# Patient Record
Sex: FEMALE | Race: WHITE
[De-identification: names, ages, dates, MRNs, and addresses within clinical notes are randomized per-mention and may not be internally consistent; named-entity substitution may affect disease eponyms.]

---

## 2017-01-18 ENCOUNTER — HOSPITAL ENCOUNTER (EMERGENCY)
Dept: HOSPITAL 75 - ER | Age: 67
Discharge: HOME | End: 2017-01-18
Payer: MEDICARE

## 2017-01-18 VITALS — SYSTOLIC BLOOD PRESSURE: 125 MMHG | DIASTOLIC BLOOD PRESSURE: 71 MMHG

## 2017-01-18 VITALS — WEIGHT: 145 LBS | HEIGHT: 63 IN | BODY MASS INDEX: 25.69 KG/M2

## 2017-01-18 DIAGNOSIS — I10: Primary | ICD-10-CM

## 2017-01-18 DIAGNOSIS — Z79.82: ICD-10-CM

## 2017-01-18 DIAGNOSIS — Z79.899: ICD-10-CM

## 2017-01-18 DIAGNOSIS — E87.1: ICD-10-CM

## 2017-01-18 LAB
ALBUMIN SERPL-MCNC: 4.1 G/DL (ref 3.2–4.5)
ALT SERPL-CCNC: 15 U/L (ref 0–55)
ANION GAP SERPL CALC-SCNC: 11 MMOL/L (ref 5–14)
APTT BLD: 29 SEC (ref 24–35)
AST SERPL-CCNC: 25 U/L (ref 5–34)
BASOPHILS # BLD AUTO: 0 10^3/UL (ref 0–0.1)
BASOPHILS NFR BLD AUTO: 1 % (ref 0–10)
BILIRUB SERPL-MCNC: 1 MG/DL (ref 0.1–1)
BILIRUB UR QL STRIP: NEGATIVE
BUN SERPL-MCNC: 10 MG/DL (ref 7–18)
BUN/CREAT SERPL: 13
CALCIUM SERPL-MCNC: 8.7 MG/DL (ref 8.5–10.1)
CHLORIDE SERPL-SCNC: 92 MMOL/L (ref 98–107)
CK SERPL-CCNC: 37 U/L (ref 29–168)
CO2 SERPL-SCNC: 23 MMOL/L (ref 21–32)
CREAT SERPL-MCNC: 0.76 MG/DL (ref 0.6–1.3)
EOSINOPHIL # BLD AUTO: 0.1 10^3/UL (ref 0–0.3)
EOSINOPHIL NFR BLD AUTO: 2 % (ref 0–10)
ERYTHROCYTE [DISTWIDTH] IN BLOOD BY AUTOMATED COUNT: 12.1 % (ref 10–14.5)
ETHANOL SERPL-MCNC: < 10 MG/DL (ref ?–10)
GFR SERPLBLD BASED ON 1.73 SQ M-ARVRAT: > 60 ML/MIN
GLUCOSE SERPL-MCNC: 117 MG/DL (ref 70–105)
INR PPP: 1.1 (ref 0.8–1.4)
KETONES UR QL STRIP: NEGATIVE
LEUKOCYTE ESTERASE UR QL STRIP: (no result)
LYMPHOCYTES # BLD AUTO: 1.9 X 10^3 (ref 1–4)
LYMPHOCYTES NFR BLD AUTO: 29 % (ref 12–44)
MAGNESIUM SERPL-MCNC: 1.7 MG/DL (ref 1.8–2.4)
MCH RBC QN AUTO: 33 PG (ref 25–34)
MCHC RBC AUTO-ENTMCNC: 35 G/DL (ref 32–36)
MCV RBC AUTO: 94 FL (ref 80–99)
MONOCYTES # BLD AUTO: 0.5 X 10^3 (ref 0–1)
MONOCYTES NFR BLD AUTO: 8 % (ref 0–12)
NEUTROPHILS # BLD AUTO: 3.9 X 10^3 (ref 1.8–7.8)
NEUTROPHILS NFR BLD AUTO: 61 % (ref 42–75)
NITRITE UR QL STRIP: NEGATIVE
PH UR STRIP: 6.5 [PH] (ref 5–9)
PLATELET # BLD: 224 10^3/UL (ref 130–400)
PMV BLD AUTO: 10 FL (ref 7.4–10.4)
POTASSIUM SERPL-SCNC: 4.5 MMOL/L (ref 3.6–5)
PROT SERPL-MCNC: 7.5 G/DL (ref 6.4–8.2)
PROT UR QL STRIP: NEGATIVE
PROTHROMBIN TIME: 13.4 SEC (ref 12.2–14.7)
RBC # BLD AUTO: 4.17 10^6/UL (ref 4.35–5.85)
SODIUM SERPL-SCNC: 126 MMOL/L (ref 135–145)
SP GR UR STRIP: 1.01 (ref 1.02–1.02)
SQUAMOUS #/AREA URNS HPF: (no result) /HPF
TROPONIN I SERPL-MCNC: < 0.3 NG/ML (ref ?–0.3)
UROBILINOGEN UR-MCNC: NORMAL MG/DL
WBC # BLD AUTO: 6.4 10^3/UL (ref 4.3–11)
WBC #/AREA URNS HPF: (no result) /HPF

## 2017-01-18 PROCEDURE — 82553 CREATINE MB FRACTION: CPT

## 2017-01-18 PROCEDURE — 82550 ASSAY OF CK (CPK): CPT

## 2017-01-18 PROCEDURE — 93005 ELECTROCARDIOGRAM TRACING: CPT

## 2017-01-18 PROCEDURE — 85730 THROMBOPLASTIN TIME PARTIAL: CPT

## 2017-01-18 PROCEDURE — 81000 URINALYSIS NONAUTO W/SCOPE: CPT

## 2017-01-18 PROCEDURE — 83735 ASSAY OF MAGNESIUM: CPT

## 2017-01-18 PROCEDURE — 93041 RHYTHM ECG TRACING: CPT

## 2017-01-18 PROCEDURE — 85025 COMPLETE CBC W/AUTO DIFF WBC: CPT

## 2017-01-18 PROCEDURE — 96360 HYDRATION IV INFUSION INIT: CPT

## 2017-01-18 PROCEDURE — 85610 PROTHROMBIN TIME: CPT

## 2017-01-18 PROCEDURE — 80320 DRUG SCREEN QUANTALCOHOLS: CPT

## 2017-01-18 PROCEDURE — 83880 ASSAY OF NATRIURETIC PEPTIDE: CPT

## 2017-01-18 PROCEDURE — 36415 COLL VENOUS BLD VENIPUNCTURE: CPT

## 2017-01-18 PROCEDURE — 80053 COMPREHEN METABOLIC PANEL: CPT

## 2017-01-18 PROCEDURE — 84443 ASSAY THYROID STIM HORMONE: CPT

## 2017-01-18 PROCEDURE — 71010: CPT

## 2017-01-18 PROCEDURE — 84484 ASSAY OF TROPONIN QUANT: CPT

## 2017-01-18 NOTE — DIAGNOSTIC IMAGING REPORT
INDICATION: 66-year-old female presents with high blood pressure.



COMPARISONS: 02/20/2012.



FINDINGS:



Single view of the chest shows borderline cardiomegaly. There is

slight prominence of the ascending segment of the thoracic

aorta. There is tortuosity of the descending segment. No

consolidations are seen. Soft tissues and bony thorax are

normal.



IMPRESSION:

1. Tortuous thoracic aorta.

2. Slight prominent of the ascending segment of the thoracic

aorta. This may be associated with some poststenotic dilatation

from aortic stenosis. Correlate clinically. Overall no

significant adverse interval change since the prior exam.

3. No acute consolidations.



Dictated by:



Dictated on workstation # [id number]

## 2017-01-18 NOTE — XMS REPORT
[facility]

 Created on: 2016



Radha Hardin

External Reference #: [id number]

: 1950

Sex: Female



Demographics







 Address  [address]

[place]  [address]

 

 Home Phone  [phone]

 

 Preferred Language  Unknown

 

 Marital Status  Unknown

 

 Nondenominational Affiliation  Unknown

 

 Race  White

 

 Ethnic Group  Not  or 





Author







 Author  VADIM AVILA

 

 Organization  [employer]

 

 Address  Unknown

 

 Phone  Unavailable







Care Team Providers







 Care Team Member Name  Role  Phone

 

 VADIM AVILA  CP  Unavailable



                                                                



Allergies, Adverse Reactions, Alerts

          





 Substance  Reaction  Event Type

 

 Penicillin V Potassium  swelling  Drug Allergy



                                                                               
         



Problems

          





 Problem Type  Condition  Code  Onset Dates  Condition Status

 

 Problem  Place of occurrence, home  E849.0     Active

 

 Problem  Foot and toe(s), blister, without mention of infection  917.2     
Active

 

 Problem  Unspecified myalgia and myositis  729.1     Active

 

 Problem  Undiagnosed cardiac murmurs  785.2     Active

 

 Problem  Need for prophylactic vaccination and inoculation, Influenza  V04.81 
    Active

 

 Problem  Essential hypertension  I10     Active

 

 Problem  Pain in joint, shoulder region  719.41     Active

 

 Problem  Acute bronchitis  466.0     Active

 

 Problem  Sprain and strain of unspecified site of back  847.9     Active

 

 Problem  Insomnia, unspecified  780.52     Active

 

 Assessment  Essential hypertension  I10     Active

 

 Problem  Occlusion and stenosis of carotid artery without mention of cerebral 
infarction  433.10     Active

 

 Problem  Chest pain, unspecified  786.50     Active

 

 Problem  Lumbago  724.2     Active



                                                                               
                                                                               
                                                            



Medications

          





 Medication  Code System  Code  Instructions  Start Date  End Date  Status  
Dosage

 

 Lisinopril  NDC  [id number]  20MG Orally 2 times a day           1 tablet



                                                                               
         



Procedures

          





 Procedure  Coding System  Code  Date

 

 Office Visit, Est Pt., Level 3  CPT-4  62168  2016

 

 [facility] VISIT ESTABLISHED PATIENT  CPT-4    2016



                                                                               
                             



Vital Signs

          





 Date/Time:  2016

 

 Temperature  97.9 F

 

 Weight  147.2 lbs

 

 Height  63 in

 

 BMI  26.07 Index

 

 Blood Pressure Diastolic  80 mmHg

 

 Blood Pressure Systolic  152 mmHg

 

 Cardiac Monitoring Heart Rate  76 bpm



                                                                              



Results

          No Known Results                                                     
               



Summary Purpose

          [employer] Submission

## 2017-01-18 NOTE — ED CARDIAC GENERAL
History of Present Illness


General


Chief Complaint:  Cardiac/General Problems


Stated Complaint:  BP PROBLEMS


Nursing Triage Note:  


pt c/o bp 159/100 et headache today.  reports headache is resolving at this 


time.


Source:  patient (SOMEWHAT LIMITED HISTORIAN)





History of Present Illness


Time seen by provider:  18:55


Initial Comments


PT ARRIVES VIA POV FROM HOME


STATES BP ELEVATED TODAY


STATES SHE TAKES LISINOPRIL AND HAS BEEN ON IT FOR 3-4 YEARS WITH NO DOSE 

CHANGE FOR OVER A YEAR


STATES SHE NEVER MISSES DOSES OF HER MEDICATION BECAUSE SHE SETS HER ALARM AND 

HER DAUGHTER ALSO CALLS HER EVERY DAY TO REMIND HER TO TAKE HER PILLS


STATES HER BP IS NORMALLY "180/80" AND SHE CHECKS HER BP EVERY OTHER DAY, AS 

INSTRUCTED BY MAJOR AVILA. 


PT STATES TODAY HER BP /100 AT 1430 /108 AT 1500 TODAY


NO SYMPTOMS, EXCEPT SHE STOOD UP VERY QUICKLY FROM A SITTING POSITION AND GOT 

DIZZY, SO SAT DOWN, THEN HER HEAD STARTED HURTING. EPISODE LASTED LESS THAN 30 

MINUTES AND HAS NOT HAD ANY SYMPTOMS SINCE THEN


PT STATES SHE FEELS COMPLETELY FINE NOW


NO CHEST PAIN


NO SHORTNESS OF BREATH


NO PALPITATIONS


NO PARESTHESIAS OR MOTOR DEFICITS


NO NAUSEA/VOMITING


NO VISION CHANGES. 


PT STATES HER DAUGHTER CALLED THE CLINIC, AND PT HAS AN APPOINTMENT TOMORROW AT 

0945 FOR THIS PROBLEM





PCP: [facility], MAJOR AVILA





Allergies and Home Medications


Allergies


Coded Allergies:  


     Penicillins (Unverified  Allergy, Severe, EDEMA, 2/20/12)





Home Medications


Acetaminophen 500 Mg Tablet 250 MG PO Q4H PRN PRN PAIN (Reported) 


   TAKES 1/2 (500MG) TABLET 


Aspirin 81 Mg Tab.chew 81 MG PO BID (Reported) 


Lisinopril 20 Mg Tablet #30 40 MG PO DAILY 


   DOSE INCREASED TO 40MG DAILY. 


   Prescribed by: ROMANA BRIGHT on 4/7/16 0953


Omega 3 Polyunsat Fatty Acids 1,000 Mg Cap 1,000 MG PO DAILY (Reported) 


Simvastatin 10 Mg Tablet #30 10 MG PO HS 


   Prescribed by: ROMANA BRIGHT on 4/7/16 0955





Review of Systems


Constitutional:   see HPI


EENTM:   No Symptoms Reported


Respiratory:   No Symptoms Reported


Cardiovascular:   See HPI


Gastrointestinal:   No Symptoms Reported


Genitourinary:   No Symptoms Reported


Musculoskeletal:   other (CHRONIC PAIN IN RIGHT LEG EVERY NIGHT)


Skin:   no symptoms reported


Psychiatric/Neurological:   See HPI Paresthesia (PT HAS BURNING IN FEET ALL THE 

TIME)


Endocrine:   No Symptoms Reported


Hematologic/Lymphatic:   No Symptoms Reported





Past Medical-Social-Family Hx


Patient Social History


Alcohol Use:  Regular Use (6-8 BEERS A DAY EVERY DAY)


Recreational Drug Use:  No


Smoking Status:  Never a Smoker


Type Used:  Smokeless Tobacco (CHEWS TOBACOO DAILY)


2nd Hand Smoke Exposure:  Yes


Recent Foreign Travel:  No


Contact w/Someone Who Travel:  No


Recent Infectious Disease Expo:  No


Recent Hopitalizations:  No


Physical Abuse Screen:  No


Sexual Abuse:  No





Immunizations Up To Date


Tetanus Booster (TDap):  Less than 5yrs


Date of Pneumonia Vaccine:  Feb 20, 2012


Date of Influenza Vaccine:  Feb 20, 2012





Surgeries


HX Surgeries:  Yes (CARDIAC CATH--NO INTERVENTION)


Surgeries:  Cardiac





Respiratory


Hx Respiratory Disorders:  No





Cardiovascular


Hx Cardiac Disorders:  Yes


Cardiac Disorders:  Heart Murmur, Hypertension, Valvular Heart Disease





Neurological


Hx Neurological Disorders:  No (HAS NOT BEEN DX WITH NEUROPATHY,BUT HAS CHRONIC 

BURNING IN FEET. )





Reproductive System


Hx Reproductive Disorders:  No


GYN History:  Menopausal





Genitourinary


Hx Genitourinary Disorders:  No





Gastrointestinal


Hx Gastrointestinal Disorders:  No





Musculoskeletal


Hx Musculoskeletal Disorders:  No





Endocrine


Hx Endocrine Disorders:  No





HEENT


HX ENT Disorders:  No





Cancer


Hx Cancer:  No





Psychosocial


Hx Psychiatric Problems:  Yes


Behavioral Health Disorders:  Anxiety





Integumentary


HX Skin/Integumentary Disorder:  No





Blood Transfusions


Hx Blood Disorders:  No


Adverse Reaction to a Blood Tr:  No





Family Medical History


Significant Family History:  Heart Disease, Diabetes, Other Conditions/Hx





Physical Exam


Vital Signs





 Vital Sign - Last 12Hours








 1/18/17 1/18/17





 17:13 21:30


 


Temp 97.7 


 


Pulse 76 


 


Resp 16 


 


B/P 194/103 


 


Pulse Ox 98 


 


O2 Delivery  Room Air





Capillary Refill : Less Than 3 Seconds


General Appearance:   No Apparent Distress WD/WN


HEENT:   PERRL/EOMI Other (VERY POOR DENTITION, MULTIPLE MISSING TEETH AND 

REMAINING TEETH WITH SIGNIFICANT DECAY)


Neck:   Full Range of Motion Normal Inspection Non Tender SuppleNo Carotid Bruit

, No JVD


Respiratory:   No Accessory Muscle Use No Respiratory Distress Other (LUNG 

SOUNDS COARSE THROUGHOUT BILATERALLY)


Cardiovascular:   Regular Rate, Rhythm No Edema No JVD Normal Peripheral Pulses 

Systolic Murmur (2/6)


Gastrointestinal:   Normal Bowel Sounds No Organomegaly No Pulsatile Mass Non 

Tender Soft


Extremity:   Normal Capillary Refill Normal Inspection Normal Range of Motion 

Non Tender No Calf Tenderness No Pedal Edema


Neurologic/Psychiatric:   Alert Oriented x3 No Motor/Sensory Deficits Normal 

Mood/Affect CNs II-XII Norm as Tested


Skin:   Normal Color Warm/Dry





Progress/Results/Core Measures


Results/Orders


Lab Results





 Laboratory Tests








Test


  1/18/17


19:21 1/18/17


19:50 Range/Units


 


 


Activated Partial


Thromboplast Time 29 


  


  24-35  SEC


 


 


Alanine Aminotransferase


(ALT/SGPT) 15 


  


  0-55  U/L


 


 


Albumin 4.1   3.2-4.5  G/DL


 


Alkaline Phosphatase 94     U/L


 


Anion Gap 11   5-14  MMOL/L


 


Aspartate Amino Transf


(AST/SGOT) 25 


  


  5-34  U/L


 


 


B-Type Natriuretic Peptide 423.5 H  <100.0  PG/ML


 


BUN/Creatinine Ratio 13    


 


Basophils # (Auto)


  0.0 


  


  0.0-0.1


10^3/uL


 


Basophils (%) (Auto) 1   0-10  %


 


Blood Urea Nitrogen 10   7-18  MG/DL


 


Calcium Level 8.7   8.5-10.1  MG/DL


 


Carbon Dioxide Level 23   21-32  MMOL/L


 


Chloride Level 92 L    MMOL/L


 


Creatine Kinase MB 1.2   <6.6  NG/ML


 


Creatinine


  0.76 


  


  0.60-1.30


MG/DL


 


Eosinophils # (Auto)


  0.1 


  


  0.0-0.3


10^3/uL


 


Eosinophils (%) (Auto) 2   0-10  %


 


Estimat Glomerular Filtration


Rate > 60 


  


   


 


 


Glucose Level 117 H    MG/DL


 


Hematocrit 39   35-52  %


 


Hemoglobin 13.8   11.5-16.0  G/DL


 


INR Comment 1.1   0.8-1.4  


 


Lymphocytes # (Auto) 1.9   1.0-4.0  X 10^3


 


Lymphocytes (%) (Auto) 29   12-44  %


 


Magnesium Level 1.7 L  1.8-2.4  MG/DL


 


Mean Corpuscular Hemoglobin 33   25-34  PG


 


Mean Corpuscular Hemoglobin


Concent 35 


  


  32-36  G/DL


 


 


Mean Corpuscular Volume 94   80-99  FL


 


Mean Platelet Volume 10.0   7.4-10.4  FL


 


Monocytes # (Auto) 0.5   0.0-1.0  X 10^3


 


Monocytes (%) (Auto) 8   0-12  %


 


Neutrophils # (Auto) 3.9   1.8-7.8  X 10^3


 


Neutrophils (%) (Auto) 61   42-75  %


 


Platelet Count


  224 


  


  130-400


10^3/uL


 


Potassium Level 4.5   3.6-5.0  MMOL/L


 


Prothrombin Time 13.4   12.2-14.7  SEC


 


Red Blood Count


  4.17 L


  


  4.35-5.85


10^6/uL


 


Red Cell Distribution Width 12.1   10.0-14.5  %


 


Serum Alcohol < 10   <10  MG/DL


 


Sodium Level 126 L  135-145  MMOL/L


 


TSH Yancey Testing


  3.40 


  


  0.35-4.94


UIU/ML


 


Total Bilirubin 1.0   0.1-1.0  MG/DL


 


Total Creatine Kinase 37     U/L


 


Total Protein 7.5   6.4-8.2  G/DL


 


Troponin I < 0.30   <0.30  NG/ML


 


White Blood Count


  6.4 


  


  4.3-11.0


10^3/uL


 


Urine Bacteria  NONE   /HPF


 


Urine Bilirubin  NEGATIVE  NEGATIVE  


 


Urine Casts  NONE   /LPF


 


Urine Clarity  CLEAR   


 


Urine Color  YELLOW   


 


Urine Crystals  NONE   /LPF


 


Urine Culture Indicated  NO   


 


Urine Glucose (UA)  NEGATIVE  NEGATIVE  


 


Urine Ketones  NEGATIVE  NEGATIVE  


 


Urine Leukocyte Esterase  1+ H NEGATIVE  


 


Urine Mucus  NEGATIVE   /LPF


 


Urine Nitrite  NEGATIVE  NEGATIVE  


 


Urine Protein  NEGATIVE  NEGATIVE  


 


Urine RBC  NONE   /HPF


 


Urine RBC (Auto)  NEGATIVE  NEGATIVE  


 


Urine Specific Gravity  1.010 L 1.016-1.022  


 


Urine Squamous Epithelial


Cells 


  0-2 


   /HPF


 


 


Urine Urobilinogen  NORMAL  NORMAL  MG/DL


 


Urine WBC  2-5   /HPF


 


Urine pH  6.5  5-9  








My Orders





 Orders-MATTHEW DOMÍNGUEZ K DO


Alcohol (1/18/17 19:09)


BNP (1/18/17 19:09)


Cbc With Automated Diff (1/18/17 19:09)


Comprehensive Metabolic Panel (1/18/17 19:09)


Creatine Kinase (1/18/17 19:09)


Creatine Kinase Mb (1/18/17 19:09)


Magnesium (1/18/17 19:09)


Protime With Inr (1/18/17 19:09)


Partial Thromboplastin Time (1/18/17 19:09)


Thyroid Analyzer (1/18/17 19:09)


Troponin I (1/18/17 19:09)


Ua Culture If Indicated (1/18/17 19:09)


Ekg Tracing (1/18/17 19:09)


Monitor-Rhythm Ecg Trace Only (1/18/17 19:09)


Chest 1 View, Ap/Pa Only (1/18/17 19:09)


Clonidine  Tablet (Catapres  Tablet) (1/18/17 19:15)


Saline Lock/Iv-Start (1/18/17 19:09)


Saline Lock/Iv-Start (1/18/17 20:48)


Ns Iv 500 Ml (Sodium Chloride 0.9%) (1/18/17 20:48)





Medications Given in ED





 Current Medications








 Medications  Dose


 Ordered  Sig/Stanislav


 Route  Start Time


 Stop Time Status Last Admin


Dose Admin


 


 Clonidine HCl 0.1


 mg  0.1 mg  ONCE  ONCE


 PO  1/18/17 19:15


 1/18/17 19:16 DC 1/18/17 20:00


0.1 MG


 


 Sodium Chloride  500 ml @ 0


 mls/hr  Q0M ONCE


 IV  1/18/17 20:48


 1/18/17 21:37 DC 1/18/17 21:02


0 MLS/HR








Vital Signs/I&O





 Vital Sign - Last 12Hours








 1/18/17 1/18/17





 17:13 21:30


 


Temp 97.7 97.7


 


Pulse 76 62


 


Resp 16 16


 


B/P 194/103 


 


Pulse Ox 98 98


 


O2 Delivery  Room Air








Blood Pressure Mean:  133


Progress Note :  


Progress Note


BP DOWN AT TIME OF DISMISSAL 


UNEVENTFUL ER STAY





ECG


Initial ECG Impression Time:  19:21


Initial ECG Rate:  78


Initial ECG Rhythm:  Normal Sinus


Initial ECG Comparisson:  Unchanged





Diagnostic Imaging





Comments


CXR--NO ACUTE PROCESS, CHRONIC APPEARING CHANGES--PER RADIOLOGIST REPORT


   Reviewed:  Reviewed by Me





Departure


Impression


Impression:  


 Primary Impression:  


 Chronic hypertension


 Additional Impression:  


 Chronic hyponatremia


Disposition:  01 HOME, SELF-CARE


Condition:  Improved





Departure-Patient Inst.


Referrals:  


[facility] (PCP/Family)


Primary Care Physician


Patient Instructions:  Heart Healthy Diet, High Blood Pressure (DC), High Blood 

Pressure Emergencies





Add. Discharge Instructions:


TAKE YOUR MEDICATIONS AS PRESCRIBED





KEEP YOUR APPOINTMENT TOMORROW AS SCHEDULED





All discharge instructions reviewed with patient and/or family. Voiced 

understanding.








MATTHEW DOMÍNGUEZ DO Jan 18, 2017 19:14

## 2018-05-22 ENCOUNTER — HOSPITAL ENCOUNTER (OUTPATIENT)
Dept: HOSPITAL 75 - CARD | Age: 68
End: 2018-05-22
Attending: INTERNAL MEDICINE
Payer: MEDICARE

## 2018-05-22 DIAGNOSIS — I48.0: Primary | ICD-10-CM

## 2018-05-22 DIAGNOSIS — I35.0: ICD-10-CM

## 2018-05-22 DIAGNOSIS — Z72.0: ICD-10-CM

## 2018-05-22 PROCEDURE — 93306 TTE W/DOPPLER COMPLETE: CPT

## 2018-06-29 ENCOUNTER — HOSPITAL ENCOUNTER (OUTPATIENT)
Dept: HOSPITAL 75 - SDC | Age: 68
Discharge: HOME | End: 2018-06-29
Attending: SPECIALIST
Payer: MEDICARE

## 2018-06-29 VITALS — BODY MASS INDEX: 25.73 KG/M2 | WEIGHT: 145.19 LBS | HEIGHT: 63 IN

## 2018-06-29 VITALS — SYSTOLIC BLOOD PRESSURE: 134 MMHG | DIASTOLIC BLOOD PRESSURE: 87 MMHG

## 2018-06-29 DIAGNOSIS — I48.91: ICD-10-CM

## 2018-06-29 DIAGNOSIS — Z79.899: ICD-10-CM

## 2018-06-29 DIAGNOSIS — H26.9: Primary | ICD-10-CM

## 2018-06-29 DIAGNOSIS — Z79.01: ICD-10-CM

## 2018-06-29 DIAGNOSIS — I10: ICD-10-CM

## 2018-06-29 PROCEDURE — 93005 ELECTROCARDIOGRAM TRACING: CPT

## 2018-06-29 RX ADMIN — CYCLOPENTOLATE HYDROCHLORIDE SCH ML: 10 SOLUTION/ DROPS OPHTHALMIC at 08:32

## 2018-06-29 RX ADMIN — TETRACAINE HYDROCHLORIDE PRN ML: 5 SOLUTION OPHTHALMIC at 08:32

## 2018-06-29 RX ADMIN — PHENYLEPHRINE HYDROCHLORIDE SCH ML: 100 SOLUTION/ DROPS OPHTHALMIC at 08:28

## 2018-06-29 RX ADMIN — TETRACAINE HYDROCHLORIDE PRN ML: 5 SOLUTION OPHTHALMIC at 08:22

## 2018-06-29 RX ADMIN — TETRACAINE HYDROCHLORIDE PRN ML: 5 SOLUTION OPHTHALMIC at 08:28

## 2018-06-29 RX ADMIN — CYCLOPENTOLATE HYDROCHLORIDE SCH ML: 10 SOLUTION/ DROPS OPHTHALMIC at 08:25

## 2018-06-29 RX ADMIN — TETRACAINE HYDROCHLORIDE PRN ML: 5 SOLUTION OPHTHALMIC at 08:25

## 2018-06-29 RX ADMIN — CYCLOPENTOLATE HYDROCHLORIDE SCH ML: 10 SOLUTION/ DROPS OPHTHALMIC at 08:28

## 2018-06-29 RX ADMIN — PHENYLEPHRINE HYDROCHLORIDE SCH ML: 100 SOLUTION/ DROPS OPHTHALMIC at 08:25

## 2018-06-29 RX ADMIN — PHENYLEPHRINE HYDROCHLORIDE SCH ML: 100 SOLUTION/ DROPS OPHTHALMIC at 08:32

## 2018-06-29 NOTE — OPHTHALMOLOGIST PRE-OP NOTE
Pre-Operative Progress Note


H&P Reviewed


The H&P was reviewed, patient examined and no changes noted.


Date H&P Reviewed:  Jun 29, 2018


Time H&P Reviewed:  09:01


Pre-Op Dx


Cataract, Left Eye











DARCIE GONZALEZ MD Jun 29, 2018 09:01

## 2018-06-29 NOTE — OPHTHALMOLOGY OPERATIVE REPORT
Cataract removal/placement IOL


PREOPERATIVE DIAGNOSIS:    Cataract Left Eye


POSTOPERATIVE DIAGNOSIS: Cataract Left Eye





PROCEDURE: Cataract removal and placement of posterior chamber implant, left eye





SURGEON: Geoffrey Gonzalez 





ANESTHESIA: Topical with sedation





COMPLICATIONS: None





ESTIMATED BLOOD LOSS: Minimal 





DESCRIPTION OF PROCEDURE:


After proper informed consent was obtained, the patient, a 67 female, was taken 

to the Operating Room and the left eye was anesthetized with tetracaine.  The 

left eye was then prepped and draped in the usual manner.  A wire lid speculum 

was placed. A paracentesis was made at the left hand position. Preservative 

free lidocaine was injected into the anterior chamber followed by viscoelastic.

  A clear corneal incision was made in the temporal position. A capsulorrhexis 

was preformed and the central nuclear and cortical material were removed.  The 

posterior capsule was polished and an Marky 21.0 [id number] IOL was placed into the 

capsular bag. The residual viscoelastic was aspirated and balanced saline 

solution was injected into the anterior chamber. 0.1 ml of Vancomycin (1mg/0.1ml

) was injected into the anterior chamber.





The wound was checked and found to be water tight.





The patient tolerated the procedure well without complications.











GEOFFREY GONZALEZ MD Jun 29, 2018 09:17

## 2018-06-29 NOTE — ANESTHESIA-GENERAL POST-OP
MAC


Patient Condition


Mental Status/LOC:  Same as Preop


Cardiovascular:  Satisfactory


Nausea/Vomiting:  Absent


Respiratory:  Satisfactory


Pain:  Controlled


Complications:  Absent





Post Op Complications


Complications


None





Follow Up Care/Instructions


Patient Instructions


None needed.





Anesthesiology Discharge Order


Discharge Order


Patient is doing well, no complaints, stable vital signs, no apparent adverse 

anesthesia problems.   


No complications reported per nursing.











CIERRA CORONEL CRNA Jun 29, 2018 10:54

## 2018-07-18 ENCOUNTER — HOSPITAL ENCOUNTER (OUTPATIENT)
Dept: HOSPITAL 75 - PREOP | Age: 68
End: 2018-07-18
Attending: SPECIALIST
Payer: MEDICARE

## 2018-07-18 VITALS — HEIGHT: 63 IN | WEIGHT: 145.19 LBS | BODY MASS INDEX: 25.73 KG/M2

## 2018-07-18 DIAGNOSIS — H26.9: ICD-10-CM

## 2018-07-18 DIAGNOSIS — Z01.818: Primary | ICD-10-CM

## 2018-07-20 ENCOUNTER — HOSPITAL ENCOUNTER (OUTPATIENT)
Dept: HOSPITAL 75 - SDC | Age: 68
Discharge: HOME | End: 2018-07-20
Attending: SPECIALIST
Payer: MEDICARE

## 2018-07-20 VITALS — BODY MASS INDEX: 25.73 KG/M2 | HEIGHT: 63 IN | WEIGHT: 145.19 LBS

## 2018-07-20 VITALS — SYSTOLIC BLOOD PRESSURE: 141 MMHG | DIASTOLIC BLOOD PRESSURE: 87 MMHG

## 2018-07-20 VITALS — SYSTOLIC BLOOD PRESSURE: 116 MMHG | DIASTOLIC BLOOD PRESSURE: 70 MMHG

## 2018-07-20 DIAGNOSIS — H26.9: Primary | ICD-10-CM

## 2018-07-20 DIAGNOSIS — Z79.01: ICD-10-CM

## 2018-07-20 DIAGNOSIS — I48.91: ICD-10-CM

## 2018-07-20 DIAGNOSIS — I10: ICD-10-CM

## 2018-07-20 RX ADMIN — TETRACAINE HYDROCHLORIDE PRN ML: 5 SOLUTION OPHTHALMIC at 07:59

## 2018-07-20 RX ADMIN — PHENYLEPHRINE HYDROCHLORIDE SCH ML: 100 SOLUTION/ DROPS OPHTHALMIC at 08:02

## 2018-07-20 RX ADMIN — TETRACAINE HYDROCHLORIDE PRN ML: 5 SOLUTION OPHTHALMIC at 08:05

## 2018-07-20 RX ADMIN — TETRACAINE HYDROCHLORIDE PRN ML: 5 SOLUTION OPHTHALMIC at 08:08

## 2018-07-20 RX ADMIN — CYCLOPENTOLATE HYDROCHLORIDE SCH ML: 10 SOLUTION/ DROPS OPHTHALMIC at 08:05

## 2018-07-20 RX ADMIN — CYCLOPENTOLATE HYDROCHLORIDE SCH ML: 10 SOLUTION/ DROPS OPHTHALMIC at 08:02

## 2018-07-20 RX ADMIN — PHENYLEPHRINE HYDROCHLORIDE SCH ML: 100 SOLUTION/ DROPS OPHTHALMIC at 08:05

## 2018-07-20 RX ADMIN — PHENYLEPHRINE HYDROCHLORIDE SCH ML: 100 SOLUTION/ DROPS OPHTHALMIC at 08:08

## 2018-07-20 RX ADMIN — TETRACAINE HYDROCHLORIDE PRN ML: 5 SOLUTION OPHTHALMIC at 08:02

## 2018-07-20 RX ADMIN — CYCLOPENTOLATE HYDROCHLORIDE SCH ML: 10 SOLUTION/ DROPS OPHTHALMIC at 08:08

## 2018-07-20 NOTE — OPHTHALMOLOGIST PRE-OP NOTE
Pre-Operative Progress Note


H&P Reviewed


The H&P was reviewed, patient examined and no changes noted.


Date H&P Reviewed:  Jul 20, 2018


Time H&P Reviewed:  08:44


Pre-Op Dx


Cataract, Right Eye











DARCIE GONZALEZ MD Jul 20, 2018 08:45

## 2018-07-20 NOTE — ANESTHESIA-GENERAL POST-OP
MAC


Patient Condition


Mental Status/LOC:  Same as Preop


Cardiovascular:  Satisfactory


Nausea/Vomiting:  Absent


Respiratory:  Satisfactory


Pain:  Controlled


Complications:  Absent





Post Op Complications


Complications


None





Follow Up Care/Instructions


Patient Instructions


None needed.





Anesthesiology Discharge Order


Discharge Order


Patient was seen after the procedure and she was doing well, no complaints, 

stable vital signs, no apparent adverse anesthesia problems.











ZANA KING DO Jul 20, 2018 09:29

## 2018-07-20 NOTE — OPHTHALMOLOGY OPERATIVE REPORT
Cataract removal/placement IOL


PREOPERATIVE DIAGNOSIS: Cataract Right Eye


POSTOPERATIVE DIAGNOSIS: Cataract Right Eye





PROCEDURE: Cataract removal and placement of posterior chamber implant, right 

eye





SURGEON: Geoffrey Gonzalez 





ANESTHESIA: Topical with sedation





COMPLICATIONS: None





ESTIMATED BLOOD LOSS: Minimal 





DESCRIPTION OF PROCEDURE:


After proper informed consent was obtained, the patient, a 67 female, was taken 

to the Operating Room and the right eye was anesthetized with tetracaine.  The 

right eye was then prepped and draped in the usual manner.  A wire lid speculum 

was placed. A paracentesis was made at the left hand position. Preservative 

free lidocaine was injected into the anterior chamber followed by viscoelastic.

  A clear corneal incision was made in the temporal position. A capsulorrhexis 

was preformed and the central nuclear and cortical material were removed.  The 

posterior capsule was polished and Marky 21.0 [id number] IOL was placed into the 

capsular bag. The residual viscoelastic was aspirated and balanced saline 

solution was injected into the anterior chamber. Vancomycin was injected into 

the anterior chamber.





The wound was checked and found to be water tight.





The patient tolerated the procedure well without complications.











GEOFFREY GONZALEZ MD Jul 20, 2018 09:06

## 2019-02-17 ENCOUNTER — HOSPITAL ENCOUNTER (EMERGENCY)
Dept: HOSPITAL 75 - ER | Age: 69
Discharge: LEFT BEFORE DISCHARGE | End: 2019-02-17
Payer: MEDICARE

## 2019-02-17 VITALS — DIASTOLIC BLOOD PRESSURE: 103 MMHG | SYSTOLIC BLOOD PRESSURE: 155 MMHG

## 2019-02-17 VITALS — HEIGHT: 63 IN | BODY MASS INDEX: 25.69 KG/M2 | WEIGHT: 145 LBS

## 2019-02-17 DIAGNOSIS — R19.4: Primary | ICD-10-CM

## 2019-02-17 PROCEDURE — 99281 EMR DPT VST MAYX REQ PHY/QHP: CPT

## 2019-02-17 NOTE — XMS REPORT
[facility]

 Created on: 2017



Radha Hardin

External Reference #: [id number]

: 1950

Sex: Female



Demographics







 Address  [address] [place]  [address]

 

 Preferred Language  Unknown

 

 Marital Status  Unknown

 

 Restorationist Affiliation  Unknown

 

 Race  Unknown

 

 Ethnic Group  Unknown





Author







 Author  VADIM AVILA

 

 [facility]

 

 Address  [address] [place]  [address]



 

 Phone  [phone]







Care Team Providers







 Care Team Member Name  Role  Phone

 

 AVILAVADIM BASSETT  Unavailable  [phone]







PROBLEMS







 Type  Condition  ICD9-CM Code  LRM71-NS Code  Onset Dates  Condition Status  
SNOMED Code

 

 Problem  Coronary artery disease involving native coronary artery of native 
heart without angina pectoris     I25.10     Active  1437647617696

 

 Problem  Hyponatremia     E87.1     Active  99744492

 

 Problem  Occlusion and stenosis of carotid artery without mention of cerebral 
infarction  433.10        Active  755596417095083

 

 Problem  Lumbago  724.2        Active  152370333

 

 Problem  Flexural eczema     L20.82     Active  47437548

 

 Problem  Essential hypertension     I10     Active  97415066







ALLERGIES







 Substance  Reaction  Event Type  Date  Status

 

 Penicillin V Potassium  swelling  Drug Allergy    Active







SOCIAL HISTORY

Never Assessed



PLAN OF CARE







 Activity  Details









  









 Follow Up  4 Weeks Reason:htn







VITAL SIGNS







 Height  63 in  2017

 

 Weight  148 lbs  2017

 

 Temperature  97.5 degrees Fahrenheit  2017

 

 Heart Rate  68 bpm  2017

 

 Respiratory Rate  16   2017

 

 BMI  26.21 kg/m2  2017

 

 Blood pressure systolic  120 mmHg  2017

 

 Blood pressure diastolic  80 mmHg  2017







MEDICATIONS







 Medication  Instructions  Dosage  Frequency  Start Date  End Date  Duration  
Status

 

 Aspir-Low 81 MG  Orally Once a day  1 tablet  24h           Active

 

 Clonidine HCl 0.1 MG  Orally Once a day as needed  1 tablet prn  b/p > 160/100
             Active

 

 Lisinopril 20 mg  Orally 2 times a day  1 tablet in am  1.5 tab hs  12h       
    Active







RESULTS

No Results



PROCEDURES







 Procedure  Date Ordered  Result  Body Site

 

 [facility] VISIT ESTABLISHED PATIENT  2017      







IMMUNIZATIONS

No Known Immunizations



MEDICAL (GENERAL) HISTORY







 Type  Description  Date

 

 Medical History  heart murmur   

 

 Medical History  hypertension   

 

 Medical History  anemia   

 

 Medical History  Thyroid disorder   

 

 Surgical History  tubal ligation  1985

 

 Hospitalization History  childbirth   

 

 Hospitalization History  fall rt rib contusion/possible kidney contusion

## 2019-02-17 NOTE — XMS REPORT
[facility]

 Created on: 2018



Radha Hardin

External Reference #: [id number]

: 1950

Sex: Female



Demographics







 Address  [address] [place]  [address]

 

 Preferred Language  Unknown

 

 Marital Status  Unknown

 

 Mormonism Affiliation  Unknown

 

 Race  Unknown

 

 Ethnic Group  Unknown





Author







 Author  VADIM AVILA

 

 [facility]

 

 Address  [address] [place]  [address]



 

 Phone  [phone]







Care Team Providers







 Care Team Member Name  Role  Phone

 

 VADIM AVILA  Unavailable  [phone]







PROBLEMS







 Type  Condition  ICD9-CM Code  BHE02-SA Code  Onset Dates  Condition Status  
SNOMED Code

 

 Problem  Coronary artery disease involving native coronary artery of native 
heart without angina pectoris     I25.10     Active  1781533575120

 

 Problem  Hyponatremia     E87.1     Active  18358811

 

 Problem  Occlusion and stenosis of carotid artery without mention of cerebral 
infarction  433.10        Active  046224791536192

 

 Problem  Flexural eczema     L20.82     Active  29924112

 

 Problem  Essential hypertension     I10     Active  29921762







ALLERGIES







 Substance  Reaction  Event Type  Date  Status

 

 Penicillin V Potassium  swelling  Drug Allergy    Active







ENCOUNTERS







 Encounter  Location  Date  Diagnosis

 

 [place] [id number] [place] [phone]-
[phone]     

 

 [place] [address] [phone]
[phone]     

 

 [place] [id number] [place] [phone]
[phone]    Essential hypertension I10

 

 [place] [id number] [place] [address] [phone]-
[phone]     

 

 [place] [id number] [place] [address] [phone]-
[phone]  06 Oct, 2017  Essential hypertension I10 ; Screening for diabetes 
mellitus Z13.1 and Screening for lipid disorders Z13.220

 

 [place] [id number] [place] [phone]
[phone]  05 Oct, 2017   

 

 [place] [id number] [place] [phone]
[phone]  04 Oct, 2017  Essential hypertension I10

 

 [place] [id number] [phone]
[phone]  21 Sep, 2017  Essential hypertension I10 ; Coronary artery disease 
involving native coronary artery of native heart without angina pectoris I25.10 
; Screening for diabetes mellitus Z13.1 and Screening for lipid disorders 
Z13.220

 

 [facility]  [id number] [place] [id number]    Essential hypertension I10

 

 [facility]  [address] [id number]  19 May, 
2017   

 

 [facility]  [address] [id number]    Essential hypertension I10

 

 [facility]  [address] [id number]  22 Mar, 
2017  Essential hypertension I10

 

 [facility]  [address] [id number]    Essential hypertension I10

 

 [facility]  [address] [id number]    Essential hypertension I10

 

 [facility]  [address] [id number]    Essential hypertension I10 ; Cardiac murmur, previously undiagnosed R01.1 
and Encounter for immunization Z23

 

 [facility]  [id number] [place] [address] [phone]  16 May, 
2016  Essential hypertension I10

 

 [place] [id number] [place] [phone]    Essential hypertension I10 and Hyponatremia E87.1

 

 [facility]  [address] [phone]
[phone]     

 

 [facility]  [address] [id number]  22 Mar, 
2016  Essential hypertension I10 and Flexural eczema L20.82

 

 [facility]  [id number] [place] [address] [phone]    Essential hypertension I10 and Flexural eczema L20.82

 

 [facility]  [address] [id number]    Essential hypertension I10

 

 [facility]  [id number] [place] [phone]    Essential hypertension I10

 

 [facility]  [id number] [id number]  29 Oct, 
2015  Encounter for immunization Z23 ; Occlusion and stenosis of unspecified 
carotid artery I65.29 and Essential (primary) hypertension I10

 

 [facility]  [id number] [place] [phone]  22 Oct, 
2015  Sprain of other ligament of left ankle, initial encounter S93.492A and 
Essential hypertension I10

 

 [facility]  [id number] [place] [phone]  
18 Sep, 2015   

 

 [facility]  [id number] [place] [address] [phone]
[phone]     

 

 [facility]  [id number] [place] [address] [phone]
[phone]     

 

 [facility]  [id number] [place] [address] [phone]
[phone]     

 

 [facility]  [id number] [place] [address] [phone]
[phone]     

 

 [facility]  [id number] [place] [address] [id number]     

 

 [facility]  [address] [place] [address] [phone]
[phone]     

 

 [facility]  [id number] [place] [id number]     

 

 [facility]  [id number] [place] [address] [phone]
[phone]     

 

 [facility]  [id number] [place] [phone]     

 

 [facility]  [id number] [place] [address] [phone]
[phone]     

 

 [facility]  [id number] [place] [phone]     

 

 [facility]  [address] [id number][place] [phone]-
3516     

 

 [facility]  [address] [id number] [place] [id number]  26 Dec, 
2013   

 

 [facility]  [address] [facility] [id number][place] [phone]-
[phone]  26 Dec, 2013   

 

 [facility]  [address] [facility] [id number][place] [phone]-
[phone]     

 

 [facility]  [address] [id number] [place] [phone]     

 

 [facility]  [address] [id number] [place] [phone]  24 Sep, 
2013   

 

 [facility]  [address] [id number] [place] [id number]  21 Sep, 
2013   

 

 [facility]  [address] [facility] [id number], [place] [phone]-
[phone]  20 Sep, 2013   

 

 [facility]  [address] [id number] [place] [id number]  26 Aug, 
2013   

 

 [facility]  [address] [id number] [place], [place] [id number]  25 Mar, 
2013   

 

 [facility]  [address] [id number] [place] [id number]  12 Dec, 
2012   

 

 [facility]  [address] [id number], [place] [address]-
1528  12 Dec, 2012   

 

 [facility]  [address] [id number] [place] [id number]     

 

 [facility]  [address] [id number][place] [address]-
7449     

 

 [facility]  [address] [id number] [place] [phone]  25 Sep, 
2012   

 

 [facility]  [address] [id number] [place] [id number]  20 Aug, 
2012   

 

 [facility]  [address] [id number] [place], [place] [id number]  06 Aug, 
2012   

 

 [facility]  [address] [id number] [place] [id number]     

 

 [facility]  [address] [id number] [place] [id number]     

 

 [facility]  [address] [id number] [place] [phone]     

 

 [facility]  [address] [id number] [place] [id number]  18 May, 
2012   

 

 [facility]  [address] [id number] [place] [id number]  09 Mar, 
2012   

 

 [facility]  [address] [id number] [place] [id number]  07 Mar, 
2012   

 

 [facility]  [address] [id number] [place] [phone]     

 

 [facility]  [address] [id number] [place] [phone]     

 

 [facility]  [address] [id number] [place] [phone]     

 

 [facility]  [id number] [place] [address] [phone]
[phone]  20 Dec, 2010   

 

 [facility]  [id number] [place] [address] [phone]
[phone]  20 Dec, 2010   

 

 [facility]  [id number] [place] [address] [phone]
[phone]  20 Oct, 2010   

 

 [facility]  [id number] [place] [address] [phone]-
[phone]  22 Oct, 2009   

 

 [facility]  [id number] [place] [address] [phone]
[phone]  18 Sep, 2009   







IMMUNIZATIONS

No Known Immunizations



SOCIAL HISTORY

Never Assessed



REASON FOR VISIT

Hypertension f/u [place] RN



PLAN OF CARE







 Activity  Details









  









 Follow Up  3 Months Reason:htn







VITAL SIGNS







 Height  63 in  2017

 

 Weight  151.2 lbs  2017

 

 Temperature  98.0 degrees Fahrenheit  2017

 

 Heart Rate  72 bpm  2017

 

 Respiratory Rate  18   2017

 

 BMI  26.78 kg/m2  2017

 

 Blood pressure systolic  142 mmHg  2017

 

 Blood pressure diastolic  72 mmHg  2017







MEDICATIONS







 Medication  Instructions  Dosage  Frequency  Start Date  End Date  Duration  
Status

 

 Clonidine HCl 0.1 MG  Orally Once a day as needed  1 tablet prn  b/p > 160/100
             Active

 

 Zyrtec Allergy 10 MG  Orally Once a day  1 tablet  24h           Active

 

 Aspir-Low 81 MG  Orally Once a day  1 tablet  24h           Active

 

 Amlodipine Besylate 5 mg  Orally Once a day at hs  1 tablet       
   30  Active

 

 Lisinopril 20 mg  Orally 2 times a day  1 tablet in am  1.5 tab hs  12h       
    Active







RESULTS

No Results



PROCEDURES







 Procedure  Date Ordered  Result  Body Site

 

 [facility] VISIT ESTABLISHED PATIENT  2017      







INSTRUCTIONS





MEDICATIONS ADMINISTERED

No Known Medications



MEDICAL (GENERAL) HISTORY







 Type  Description  Date

 

 Medical History  heart murmur   

 

 Medical History  hypertension   

 

 Medical History  anemia   

 

 Medical History  Thyroid disorder   

 

 Surgical History  tubal ligation  1985

 

 Hospitalization History  childbirth   

 

 Hospitalization History  fall rt rib contusion/possible kidney contusion

## 2019-02-17 NOTE — XMS REPORT
Continuity of Care Document

 Created on: 2019



BELLA HAMILTON

External Reference #: [id number]

: 1950

Sex: Female



Demographics







 Address  [address] [place]  [address]

 

 Home Phone  [phone] x

 

 Preferred Language  Unknown

 

 Marital Status  Unknown

 

 Mu-ism Affiliation  Unknown

 

 Race  Unknown

 

 Ethnic Group  Unknown





Author







 Author  [facility]

 

 Address  Unknown

 

 Phone  Unavailable



              



Allergies

      





 Active            Description            Code            Type            
Severity            Reaction            Onset            Reported/Identified   
         Relationship to Patient            Clinical Status        

 

 Yes            Penicillins                         Drug Allergy               
                                    2009                                  

 

 Yes            Penicillins                         Drug Allergy            N/A
            N/A                         2009                             
     

 

 Yes            Penicillins            [id number]            Drug Allergy      
      Severe            EDEMA                         2012               
                   



                      



Medications

      



There is no data.                  



Problems

      





 Date Dx Coded            Attending            Type            Code            
Diagnosis            Diagnosed By        

 

 2009                                      244.9            
HYPOTHYROIDISM MYXEDEMA WITH CEREBRAL DEGENERATION                     

 

 2009                                      428.20            HEART 
FAILURE SYSTOLIC                     

 

 2009                                      428.30            UNSPECIFIED 
DIASTOLIC HEART FAILURE                     

 

 2009                                      244.9            
HYPOTHYROIDISM MYXEDEMA WITH CEREBRAL DEGENERATION                     

 

 2009                                      428.20            HEART 
FAILURE SYSTOLIC                     

 

 2009                                      428.30            UNSPECIFIED 
DIASTOLIC HEART FAILURE                     

 

 2009            VADIM MCGARRY                         244.9    
        HYPOTHYROIDISM MYXEDEMA WITH CEREBRAL DEGENERATION                     

 

 2009            VADIM MCGARRY                         428.20   
         HEART FAILURE SYSTOLIC                     

 

 2009            VADIM MCGARRY                         428.30   
         UNSPECIFIED DIASTOLIC HEART FAILURE                     

 

 2009            ILIR BORGES DO                         244.9          
  HYPOTHYROIDISM MYXEDEMA WITH CEREBRAL DEGENERATION                     

 

 2009            LORENZA BORGES DOA K                         428.20         
   HEART FAILURE SYSTOLIC                     

 

 2009            LORENZA BORGES DOA K                         428.30         
   UNSPECIFIED DIASTOLIC HEART FAILURE                     

 

 2009            LORENZA BORGES DOA K                         244.9          
  HYPOTHYROIDISM MYXEDEMA WITH CEREBRAL DEGENERATION                     

 

 2009            BORGES DO ILIR K                         428.20         
   HEART FAILURE SYSTOLIC                     

 

 2009            BORGES DO ILIR K                         428.30         
   UNSPECIFIED DIASTOLIC HEART FAILURE                     

 

 2009            LORENZA BORGES DOA K                         244.9          
  HYPOTHYROIDISM MYXEDEMA WITH CEREBRAL DEGENERATION                     

 

 2009            BORGES DO ILIR K                         428.20         
   HEART FAILURE SYSTOLIC                     

 

 2009            BORGES DO ILIR K                         428.30         
   UNSPECIFIED DIASTOLIC HEART FAILURE                     

 

 2009            VADIM MCGARRY                         244.9    
        HYPOTHYROIDISM MYXEDEMA WITH CEREBRAL DEGENERATION                     

 

 2009            VADIM MCGARRY                         428.20   
         HEART FAILURE SYSTOLIC                     

 

 2009            VADIM MCGARRY R                         428.30   
         UNSPECIFIED DIASTOLIC HEART FAILURE                     

 

 2009            ILIR BORGES DO K                         244.9          
  HYPOTHYROIDISM MYXEDEMA WITH CEREBRAL DEGENERATION                     

 

 2009            BORGES DO, ILIR K                         428.20         
   HEART FAILURE SYSTOLIC                     

 

 2009            BORGES DO, ILIR K                         428.30         
   UNSPECIFIED DIASTOLIC HEART FAILURE                     

 

 2009            ABDULKADIR GOMEZ, BRANDON                         244.9            
HYPOTHYROIDISM MYXEDEMA WITH CEREBRAL DEGENERATION                     

 

 2009            ABDULKADIR GOMEZ, BRANDON                         428.20            
HEART FAILURE SYSTOLIC                     

 

 2009            BRANDON CHIN MD                         428.30            
UNSPECIFIED DIASTOLIC HEART FAILURE                     

 

 2009                                      401.1            ESSENTIAL 
HYPERTENSION BENIGN                     

 

 2009                                      401.1            ESSENTIAL 
HYPERTENSION BENIGN                     

 

 2009            VADMI MCGARRY                         401.1    
        ESSENTIAL HYPERTENSION BENIGN                     

 

 2009            LORENZA BORGES DOA K                         401.1          
  ESSENTIAL HYPERTENSION BENIGN                     

 

 2009            KATERINA BROWN ILIR K                         401.1          
  ESSENTIAL HYPERTENSION BENIGN                     

 

 2009            LORENZA BORGES DOA K                         401.1          
  ESSENTIAL HYPERTENSION BENIGN                     

 

 2009            VADIM MCGARRY R                         401.1    
        ESSENTIAL HYPERTENSION BENIGN                     

 

 2009            BORGES DO, ILIR K                         401.1          
  ESSENTIAL HYPERTENSION BENIGN                     

 

 2009            ABDULKADIR GOMEZ, ALI                         401.1            
ESSENTIAL HYPERTENSION BENIGN                     

 

 10/22/2009                                      380.10            INFECTIVE 
OTITIS EXTERNA, UNSPECIFIED                     

 

 10/22/2009                                      380.10            INFECTIVE 
OTITIS EXTERNA, UNSPECIFIED                     

 

 10/22/2009            VADIM MCGARRY                         380.10   
         INFECTIVE OTITIS EXTERNA, UNSPECIFIED                     

 

 10/22/2009            LORENZA BORGES DOA K                         380.10         
   INFECTIVE OTITIS EXTERNA, UNSPECIFIED                     

 

 10/22/2009            KATERINA BROWN ILIR K                         380.10         
   INFECTIVE OTITIS EXTERNA, UNSPECIFIED                     

 

 10/22/2009            BORGES DO ILIR K                         380.10         
   INFECTIVE OTITIS EXTERNA, UNSPECIFIED                     

 

 10/22/2009            VADIM MCGARRY                         380.10   
         INFECTIVE OTITIS EXTERNA, UNSPECIFIED                     

 

 10/22/2009            KATERINA BROWN ILIR K                         380.10         
   INFECTIVE OTITIS EXTERNA, UNSPECIFIED                     

 

 10/22/2009            ABDULKADIR GOMEZ, ALI                         380.10            
INFECTIVE OTITIS EXTERNA, UNSPECIFIED                     

 

 2010                                      477.0            ALLERGIC 
RHINITIS, DUE TO POLLEN                     

 

 2010                                      477.0            ALLERGIC 
RHINITIS, DUE TO POLLEN                     

 

 2010            VADIM MCGARRY                         477.0    
        ALLERGIC RHINITIS, DUE TO POLLEN                     

 

 2010            BORGES DO, ILIR K                         477.0          
  ALLERGIC RHINITIS, DUE TO POLLEN                     

 

 2010            BORGES DO, ILIR K                         477.0          
  ALLERGIC RHINITIS, DUE TO POLLEN                     

 

 2010            BORGES DO, ILIR K                         477.0          
  ALLERGIC RHINITIS, DUE TO POLLEN                     

 

 2010            VADIM MCGARRY                         477.0    
        ALLERGIC RHINITIS, DUE TO POLLEN                     

 

 2010            BORGES DO, ILIR K                         477.0          
  ALLERGIC RHINITIS, DUE TO POLLEN                     

 

 2010            BRANDON CHIN MD                         477.0            
ALLERGIC RHINITIS, DUE TO POLLEN                     

 

 2010                                      691.8            OTHER ATOPIC 
DERMATITIS AND RELATED CONDITIONS                     

 

 2010                                      691.8            OTHER ATOPIC 
DERMATITIS AND RELATED CONDITIONS                     

 

 2010            VADIM MCGARRY                         691.8    
        OTHER ATOPIC DERMATITIS AND RELATED CONDITIONS                     

 

 2010            BORGES DO ILIR K                         691.8          
  OTHER ATOPIC DERMATITIS AND RELATED CONDITIONS                     

 

 2010            KATERINA DO ILIR K                         691.8          
  OTHER ATOPIC DERMATITIS AND RELATED CONDITIONS                     

 

 2010            BORGES DO ILIR K                         691.8          
  OTHER ATOPIC DERMATITIS AND RELATED CONDITIONS                     

 

 2010            VADIM MCGARRY                         691.8    
        OTHER ATOPIC DERMATITIS AND RELATED CONDITIONS                     

 

 2010            BORGES DO ILIR K                         691.8          
  OTHER ATOPIC DERMATITIS AND RELATED CONDITIONS                     

 

 2010            BRANDON CHIN MD                         691.8            
OTHER ATOPIC DERMATITIS AND RELATED CONDITIONS                     

 

 10/20/2010                                      300.00            ANXIETY 
STATE UNSPECIFIED                     

 

 10/20/2010                                      300.00            ANXIETY 
STATE UNSPECIFIED                     

 

 10/20/2010            VADIM MCGARRY                         300.00   
         ANXIETY STATE UNSPECIFIED                     

 

 10/20/2010            BORGES DO ILIR K                         300.00         
   ANXIETY STATE UNSPECIFIED                     

 

 10/20/2010            BORGES DO, ILIR K                         300.00         
   ANXIETY STATE UNSPECIFIED                     

 

 10/20/2010            BORGES DO, ILIR K                         300.00         
   ANXIETY STATE UNSPECIFIED                     

 

 10/20/2010            VADIM MCGARRY                         300.00   
         ANXIETY STATE UNSPECIFIED                     

 

 10/20/2010            BORGES DO, ILIR K                         300.00         
   ANXIETY STATE UNSPECIFIED                     

 

 10/20/2010            ABDULKADIR GOMEZ ALI                         300.00            
ANXIETY STATE UNSPECIFIED                     

 

 2010                                      300.02            GENERALIZED 
ANXIETY DISORDER                     

 

 2010                                      698.4            DERMATITIS 
FACTITIA (ARTEFACTA)                     

 

 2010                                      300.02            GENERALIZED 
ANXIETY DISORDER                     

 

 2010                                      698.4            DERMATITIS 
FACTITIA (ARTEFACTA)                     

 

 2010            VADIM MCGARRY                         300.02   
         GENERALIZED ANXIETY DISORDER                     

 

 2010            VADIM MCGARRY                         698.4    
        DERMATITIS FACTITIA (ARTEFACTA)                     

 

 2010            BORGES DO, ILIR K                         300.02         
   GENERALIZED ANXIETY DISORDER                     

 

 2010            KATERINA DO, ILIR K                         698.4          
  DERMATITIS FACTITIA (ARTEFACTA)                     

 

 2010            BORGES DO, ILIR K                         300.02         
   GENERALIZED ANXIETY DISORDER                     

 

 2010            BORGES DO, ILIR K                         698.4          
  DERMATITIS FACTITIA (ARTEFACTA)                     

 

 2010            BORGES DO, ILIR K                         300.02         
   GENERALIZED ANXIETY DISORDER                     

 

 2010            BORGES DO, ILIR K                         698.4          
  DERMATITIS FACTITIA (ARTEFACTA)                     

 

 2010            VADIM MCAGRRY                         300.02   
         GENERALIZED ANXIETY DISORDER                     

 

 2010            VADIM MCGARRY                         698.4    
        DERMATITIS FACTITIA (ARTEFACTA)                     

 

 2010            BORGES DO, ILIR K                         300.02         
   GENERALIZED ANXIETY DISORDER                     

 

 2010            BORGES DO, ILIR K                         698.4          
  DERMATITIS FACTITIA (ARTEFACTA)                     

 

 2010            BRANDON CHIN MD                         300.02            
GENERALIZED ANXIETY DISORDER                     

 

 2010            BRANDON CHIN MD                         698.4            
DERMATITIS FACTITIA (ARTEFACTA)                     

 

 2012                         Ot            490            BRONCHITIS NOS
                     

 

 2012                         Ot            786.2            COUGH       
              

 

 2012                                      466.0            ACUTE 
BRONCHITIS                     

 

 2012                                      466.0            ACUTE 
BRONCHITIS                     

 

 2012            VADIM MCGARRY                         466.0    
        ACUTE BRONCHITIS                     

 

 2012            LORENZA BORGES DOA K                         466.0          
  ACUTE BRONCHITIS                     

 

 2012            LORENZA BORGES DOA K                         466.0          
  ACUTE BRONCHITIS                     

 

 2012            LORENZA BORGES DOA K                         466.0          
  ACUTE BRONCHITIS                     

 

 2012            VADIM MCGARRY                         466.0    
        ACUTE BRONCHITIS                     

 

 2012            KATERINA BROWN ILIR K                         466.0          
  ACUTE BRONCHITIS                     

 

 2012            BRANDON CHIN MD                         466.0            
ACUTE BRONCHITIS                     

 

 2012                                      719.41            PAIN IN 
JOINT INVOLVING SHOULDER REGION                     

 

 2012                                      719.41            PAIN IN 
JOINT INVOLVING SHOULDER REGION                     

 

 2012            VADIM MCGARRY                         719.41   
         PAIN IN JOINT INVOLVING SHOULDER REGION                     

 

 2012            KATERINA DOILIR K                         719.41         
   PAIN IN JOINT INVOLVING SHOULDER REGION                     

 

 2012            BORGES DO ILIR K                         719.41         
   PAIN IN JOINT INVOLVING SHOULDER REGION                     

 

 2012            BORGES DO ILIR K                         719.41         
   PAIN IN JOINT INVOLVING SHOULDER REGION                     

 

 2012            VADIM MCGARRY                         719.41   
         PAIN IN JOINT INVOLVING SHOULDER REGION                     

 

 2012            BORGES DO ILIR K                         719.41         
   PAIN IN JOINT INVOLVING SHOULDER REGION                     

 

 2012            BRANDON CHIN MD                         719.41            
PAIN IN JOINT INVOLVING SHOULDER REGION                     

 

 2012                                      780.52            INSOMNIA 
UNSPECIFIED                     

 

 2012                                      780.52            INSOMNIA 
UNSPECIFIED                     

 

 2012            VADIM MCGARRY                         780.52   
         INSOMNIA UNSPECIFIED                     

 

 2012            LORENZA BORGES DOA K                         780.52         
   INSOMNIA UNSPECIFIED                     

 

 2012            LORENZA BORGES DOA K                         780.52         
   INSOMNIA UNSPECIFIED                     

 

 2012            KATERINA DO ILIR K                         780.52         
   INSOMNIA UNSPECIFIED                     

 

 2012            VADIM MCGARRY                         780.52   
         INSOMNIA UNSPECIFIED                     

 

 2012            BORGES DO, ILIR K                         780.52         
   INSOMNIA UNSPECIFIED                     

 

 2012            BRANDON CHIN MD                         780.52            
INSOMNIA UNSPECIFIED                     

 

 2012                                      847.9            SPRAIN/STRAIN 
BACK UNSPEC                     

 

 2012            VADIM MCGARRY                         847.9    
        SPRAIN/STRAIN BACK UNSPEC                     

 

 2012            BORGES DOLORENZAA K                         847.9          
  SPRAIN/STRAIN BACK UNSPEC                     

 

 2012            BORGES DO, ILIR K                         847.9          
  SPRAIN/STRAIN BACK UNSPEC                     

 

 2012            BORGES DO, ILIR K                         847.9          
  SPRAIN/STRAIN BACK UNSPEC                     

 

 2012            VADIM MCGARRY                         847.9    
        SPRAIN/STRAIN BACK UNSPEC                     

 

 2012            BORGES DO, ILIR K                         847.9          
  SPRAIN/STRAIN BACK UNSPEC                     

 

 2012            BRANDON CHIN MD                         847.9            
SPRAIN/STRAIN BACK UNSPEC                     

 

 2013            VADIM MCGARRY                         724.2    
        BACK PAIN, LOWER                     

 

 2013            BORGES DO, ILIR K                         724.2          
  BACK PAIN, LOWER                     

 

 2013            BORGES ILIR BROWN K                         724.2          
  BACK PAIN, LOWER                     

 

 2013            BORGES ILIR BROWN                         724.2          
  BACK PAIN, LOWER                     

 

 2013            VADIM MCGARRY                         724.2    
        BACK PAIN, LOWER                     

 

 2013            KATERINA ILIR BROWN K                         724.2          
  BACK PAIN, LOWER                     

 

 2013            BRANDON CHIN MD                         724.2            
BACK PAIN, LOWER                     

 

 2014            VADIM MCGARRY                         729.1    
        MUSCLE PAIN                     

 

 2014            VADIM MCGARRY                         917.2    
        BLISTER OF FOOT AND TOE(S) WITHOUT INFECTION                     

 

 2014            VADIM MCGARRY                         E849.0   
         HOME ACCIDENTS                     

 

 2014            ILIR BORGES DO K                         729.1          
  MUSCLE PAIN                     

 

 2014            BORGES ILIR BROWN K                         917.2          
  BLISTER OF FOOT AND TOE(S) WITHOUT INFECTION                     

 

 2014            ILIR BORGES DO                         E849.0         
   HOME ACCIDENTS                     

 

 2014            BRANDON CHIN MD                         729.1            
MUSCLE PAIN                     

 

 2014            BRANDON CHIN MD                         917.2            
BLISTER OF FOOT AND TOE(S) WITHOUT INFECTION                     

 

 2014            BRANDON CHIN MD                         E849.0            
HOME ACCIDENTS                     

 

 2014            ILIR BORGES DO                         785.2          
  MURMURS, UNDIAGNOSED CARDIAC                     

 

 2014            ILIR BORGES DO                         V04.81         
   FLU SHOT                     

 

 2014            BRANDON CHIN MD                         785.2            
MURMURS, UNDIAGNOSED CARDIAC                     

 

 2014            BRANDON CHIN MD                         V04.81            
FLU SHOT                     

 

 2015            BRANDON CHIN MD                         433.10            
OCCLUSION AND STENOSIS OF CAROTID ARTERY WITHOUT CEREBRAL INFARCTION           
          

 

 2015            ABDULKADIR GOMEZ ALI                         786.50            
CHEST PAIN                     

 

 2015            ABDULKADIR GOMEZ FACKENDRA, BRANDON FACP CCDS            Ot            
433.10                                  

 

 2015            BRANDON CHIN MD, FACC FACP CCDS            Ot            
785.2                                  

 

 2015            ABDULKADIR GOMEZ FACC, BRANDON FACP CCDS            Ot            
786.50                                  

 

 04/15/2015            ABDULKADIR GOMEZ FACKENDRA, BRANDON FACP CCDS            Ot            
401.9            HYPERTENSION NOS                     

 

 04/15/2015            ABDULKADIR GOMEZ FACKENDRA, ALI FACP CCDS            Ot            
414.01            CORONARY ATHEROSCLEROSIS OF NATIVE CORON                     

 

 04/15/2015            ABDULKADIR GOMEZ [facility] CCDS            Ot            
424.1            AORTIC VALVE DISORDER                     

 

 04/15/2015            ABDULKADIR GOMEZ [facility] CCDS            Ot            
786.50            CHEST PAIN NOS                     

 

 04/15/2015            ABDULKADIR GOMEZ [facility] CCDS            Ot            
V03.82            PROPHYLACTIC VACC AGAINST STREPTOCOCCUS                      

 

 04/15/2015            ABDULKADIR TORRES, [facility] CCDS            Ot            
V58.69            OT MED,LT,CURRENT USE                     

 

 10/09/2015            PETER POTTER APRN            Ot            A67.1      
      INTERMEDIATE LESIONS OF PINTA                     

 

 10/09/2015            PETER POTTER APRN            Ot            S93.402A   
         SPRAIN OF UNSPECIFIED LIGAMENT OF LEFT A                     

 

 10/09/2015            PETER POTTER            Ot            S99.912A   
         UNSPECIFIED INJURY OF LEFT ANKLE, INITIA                     

 

 10/09/2015            PETER POTTER            Ot            X39.8XXA   
         OTHER EXPOSURE TO FORCES OF NATURE, INIT                     

 

 10/09/2015            PETER POTTER            Ot            Y92.009    
        [facility] PLACE IN [facility] (PRIVATE                     

 

 10/09/2015            PETER POTTER            Ot            Y99.8      
      OTHER EXTERNAL CAUSE STATUS                     

 

 2016            ROMANA DIA MD            Ot            E86.1      
      HYPOVOLEMIA                     

 

 2016            ROMANA DIA MD            Ot            E87.1      
      HYPO-OSMOLALITY AND HYPONATREMIA                     

 

 2016            ROMANA DIA MD            Ot            F10.10     
       ALCOHOL ABUSE, UNCOMPLICATED                     

 

 2016            ROMANA DIA MD            Ot            I10        
    ESSENTIAL (PRIMARY) HYPERTENSION                     

 

 2016            ROMANA DIA MD            Ot            I71.2      
      THORACIC AORTIC ANEURYSM, WITHOUT RUPTUR                     

 

 2016            ROMANA DIA MD            Ot            S20.211A   
         CONTUSION OF RIGHT FRONT WALL OF THORAX,                     

 

 2016            ROMANA DIA MD            Ot            S50.11XA   
         CONTUSION OF RIGHT FOREARM, INITIAL ENCO                     

 

 2016            ROMANA DIA MD            Ot            W17.89XA   
         OTHER FALL FROM ONE LEVEL TO ANOTHER, IN                     

 

 2016            ROMANA DIA MD            Ot            Y92.018    
        [facility] PLACE IN SINGLE-FAMILY (PRIVATE) [facility]                     

 

 2016            ROMANA DIA MD            Ot            E86.1      
                            

 

 2016            SOUTH GOMEZ, ROMANA NOEL            Ot            E87.1      
                            

 

 2016            SOUTH GOMEZ, ROMANA A            Ot            F10.10     
                             

 

 2016            SOUTH GOMEZ, ROMANA NOEL            Ot            I10        
                          

 

 2016            SOUTH GOMEZ, ROMANA NOEL            Ot            I71.2      
                            

 

 2016            SOUTH OGMEZ, ROMANA A            Ot            S20.211A   
                               

 

 2016            SOUTH GOMEZ, ROMANA A            Ot            S50.11XA   
                               

 

 2016            SOUTH GOMEZ, ROMANA NOEL            Ot            W17.89XA   
                               

 

 2016            SOUTH GOMEZ, ROMANA A            Ot            Y92.018    
                              

 

 2017            CATRACHITA DO, MATTHEW K            Ot            E87.1          
  HYPO-OSMOLALITY AND HYPONATREMIA                     

 

 2017            CATRACHITA DO, MATTHEW K            Ot            I10            
ESSENTIAL (PRIMARY) HYPERTENSION                     

 

 2017            CATRACHITA DO, MATTHEW K            Ot            Z79.82         
   LONG TERM (CURRENT) USE OF ASPIRIN                     

 

 2017            CATRACHITA DO, MATTHEW K            Ot            Z79.899        
    OTHER LONG TERM (CURRENT) DRUG THERAPY                     

 

 2017            ABDULKADIR GOMEZ FACC, BRANDON FACP CCDS            Ot            
433.10            CAROTID ARTERY OCCLUSION W O CEREBRAL IN                     

 

 2017            ABDULKADIR GOMEZ FACC, ALI FACP CCDS            Ot            
785.2            CARDIAC MURMURS NEC                     

 

 2017            ABDULKADIR GOMEZ FACC, ALI FACP CCDS            Ot            
786.50            CHEST PAIN NOS                     

 

 2017            CATRACHITA DO, MATTHEW K            Ot            E87.1          
  HYPO-OSMOLALITY AND HYPONATREMIA                     

 

 2017            CATRACHITA DO, MATTHEW K            Ot            I10            
ESSENTIAL (PRIMARY) HYPERTENSION                     

 

 2017            CATRACHITA DO, MATTHEW K            Ot            Z79.82         
   LONG TERM (CURRENT) USE OF ASPIRIN                     

 

 2017            CATRACHITA DO, MATTHEW K            Ot            Z79.899        
    OTHER LONG TERM (CURRENT) DRUG THERAPY                     

 

 2018            ABDULKADIR GOMEZ FACC, BRANDON FACP CCDS            Ot            
433.10            CAROTID ARTERY OCCLUSION W O CEREBRAL IN                     

 

 2018            ABDULKADIR GOMEZ FACC, ALI FACP CCDS            Ot            
785.2            CARDIAC MURMURS NEC                     

 

 2018            ABDULKADIR GOMEZ FACC, ALI FACP CCDS            Ot            
786.50            CHEST PAIN NOS                     

 

 2018            ABDULKADIR GOMEZ FACC, ALI FACP CCDS            Ot            
I48.0            PAROXYSMAL ATRIAL FIBRILLATION                     

 

 05/15/2018            ABDULKADIR TORRESC, ALI FACP CCDS            Ot            
433.10            CAROTID ARTERY OCCLUSION W O CEREBRAL IN                     

 

 05/15/2018            ABDULKADIR GOMEZ FACC, ALI FACP CCDS            Ot            
785.2            CARDIAC MURMURS NEC                     

 

 05/15/2018            ABDULKADIR GOMEZ FACC, ALI FACP CCDS            Ot            
786.50            CHEST PAIN NOS                     

 

 2018            ABDULKADIR GOMEZ FACC, ALI FACP CCDS            Ot            
I35.0            NONRHEUMATIC AORTIC (VALVE) STENOSIS                     

 

 2018            ABDULKADIR GOMEZ FACC, ALI FACP CCDS            Ot            
I48.0            PAROXYSMAL ATRIAL FIBRILLATION                     

 

 2018            ABDULKADIR GOMEZ FACC, ALI FACP CCDS            Ot            
Z72.0            TOBACCO USE                     

 

 2018            ABDULKADIR GOMEZ FACC, ALI FACP CCDS            Ot            
I35.0            NONRHEUMATIC AORTIC (VALVE) STENOSIS                     

 

 2018            ABDULKADIR GOMEZ FACC, ALI FACP CCDS            Ot            
I48.0            PAROXYSMAL ATRIAL FIBRILLATION                     

 

 2018            ABDULKADIR TORRESC, ALI FACP CCDS            Ot            
Z72.0            TOBACCO USE                     

 

 2018            ABDULKADIR TORRESC, ALI FACP CCDS            Ot            
I35.0            NONRHEUMATIC AORTIC (VALVE) STENOSIS                     

 

 2018            ABDULKADIR TORRESC, ALI FACP CCDS            Ot            
I48.0            PAROXYSMAL ATRIAL FIBRILLATION                     

 

 2018            ABDULKADIR GOMEZ FACC, ALI FACP CCDS            Ot            
Z72.0            TOBACCO USE                     

 

 2018            DARCIE GONZALEZ MD            Ot            Z01.818    
        ENCOUNTER FOR OTHER PREPROCEDURAL EXAMIN                     

 

 2018            ABDULKADIR GOMEZ FACC, BRANDON FACP CCDS            Ot            
433.10            CAROTID ARTERY OCCLUSION W O CEREBRAL IN                     

 

 2018            ABDULKADIR GOMEZ FACC, ALI FACP CCDS            Ot            
785.2            CARDIAC MURMURS NEC                     

 

 2018            ABDULKADIR TORRESC, ALI FACP CCDS            Ot            
786.50            CHEST PAIN NOS                     

 

 2018            ABDULKADIR TORRESC, ALI FACP CCDS            Ot            
I35.0            NONRHEUMATIC AORTIC (VALVE) STENOSIS                     

 

 2018            ABDULKADIR TORRESC, ALI FACP CCDS            Ot            
I48.0            PAROXYSMAL ATRIAL FIBRILLATION                     

 

 2018            ABDULKADIR GOMEZ FACC, ALI FACP CCDS            Ot            
Z72.0            TOBACCO USE                     

 

 2018            DARCIE GONZALEZ MD            Ot            Z01.818    
        ENCOUNTER FOR OTHER PREPROCEDURAL EXAMIN                     

 

 2018            DARCIE GONZALEZ MD            Ot            H26.9      
      UNSPECIFIED CATARACT                     

 

 2018            DARCIE GONZALEZ MD            Ot            I10        
    ESSENTIAL (PRIMARY) HYPERTENSION                     

 

 2018            DARCIE GONZALEZ MD            Ot            I48.91     
       UNSPECIFIED ATRIAL FIBRILLATION                     

 

 2018            DARCIE GONZALEZ MD            Ot            Z79.01     
       LONG TERM (CURRENT) USE OF ANTICOAGULANT                     

 

 2018            DARCIE GONZALEZ MD            Ot            Z79.899    
        OTHER LONG TERM (CURRENT) DRUG THERAPY                     

 

 2018            DARCIE GONZALEZ MD            Ot            H26.9      
      UNSPECIFIED CATARACT                     

 

 2018            DARCIE GONZALEZ MD            Ot            I10        
    ESSENTIAL (PRIMARY) HYPERTENSION                     

 

 2018            DARCIE GONZALEZ MD            Ot            I48.91     
       UNSPECIFIED ATRIAL FIBRILLATION                     

 

 2018            DARCIE GONZALEZ MD            Ot            Z79.01     
       LONG TERM (CURRENT) USE OF ANTICOAGULANT                     

 

 2018            DARCIE GONZALEZ MD            Ot            Z79.899    
        OTHER LONG TERM (CURRENT) DRUG THERAPY                     

 

 2018            DARCIE GONZALEZ MD            Ot            H26.9      
      UNSPECIFIED CATARACT                     

 

 2018            DARCIE GONZALEZ MD            Ot            I10        
    ESSENTIAL (PRIMARY) HYPERTENSION                     

 

 2018            DARCIE GONZALEZ MD            Ot            I48.91     
       UNSPECIFIED ATRIAL FIBRILLATION                     

 

 2018            DARCIE GONZALEZ MD            Ot            Z79.01     
       LONG TERM (CURRENT) USE OF ANTICOAGULANT                     

 

 2018            DARCIE GONZALEZ MD            Ot            Z79.899    
        OTHER LONG TERM (CURRENT) DRUG THERAPY                     

 

 2018            DARCIE GONZALEZ MD            Ot            H26.9      
      UNSPECIFIED CATARACT                     

 

 2018            DARCIE GONZALEZ MD            Ot            I10        
    ESSENTIAL (PRIMARY) HYPERTENSION                     

 

 2018            DARCIE GONZALEZ MD L            Ot            I48.91     
       UNSPECIFIED ATRIAL FIBRILLATION                     

 

 2018            DARCIE GONZALEZ MD            Ot            Z79.01     
       LONG TERM (CURRENT) USE OF ANTICOAGULANT                     

 

 2018            DARCIE GONZALEZ MD            Ot            Z79.899    
        OTHER LONG TERM (CURRENT) DRUG THERAPY                     

 

 2018            ANLIKER MD, DARCIE L            Ot            H26.9      
      UNSPECIFIED CATARACT                     

 

 2018            LISA GOMEZ, DARCIE STRICKLAND            Ot            Z01.818    
        ENCOUNTER FOR OTHER PREPROCEDURAL EXAMIN                     



                                                                               
                                                                               
                                                                               
                                                                               
                                                                               
                                                                               
                    



Procedures

      





 Code            Description            Performed By            Performed On   
     

 

             [id number]                                  ROUTINE VENIPUNCTURE       
                            2014        

 

             [id number]                                  EKG, TRACING (IN-HOUSE)    
                               2014        

 

             Cardiolog                                  Brandon Chin            
                       2014        

 

                                               FLU ADMINISTRATION (
MEDICARE ONLY)                                   2014        

 

             59982                                  LIPID PANEL                
                   2014        

 

             [id number]                                  MAGNESIUM                  
                 2014        

 

             62573                                  CBC                        
           2014        

 

             [id number]                                  GFR CALC (RESULT ONLY)   
                                2014        

 

             47680                                  CMP                        
           2014        

 

             40913                                  TSH                        
           2014        

 

             [id number]                                  US CAROTID DOPPLER         
                          2015        

 

             [id number]                                  OXIMETRY                   
                2015        

 

             72623                                  NUCLEAR STRESS TESTING     
                              03/10/2015        

 

             50465                                  ECHO 2D                    
               03/10/2015        



                                            



Results

      





 Test            Result            Range        









 Complete blood count (CBC) with automated white blood cell (WBC) differential 
- 17 19:21         









 Blood leukocytes automated count (number/volume)            6.4 10*3/uL       
     4.3-11.0        

 

 Blood erythrocytes automated count (number/volume)            4.17 10*6/uL    
        4.35-5.85        

 

 Venous blood hemoglobin measurement (mass/volume)            13.8 g/dL        
    11.5-16.0        

 

 Blood hematocrit (volume fraction)            39 %            35-52        

 

 Automated erythrocyte mean corpuscular volume            94 [foz_us]          
  80-99        

 

 Automated erythrocyte mean corpuscular hemoglobin (mass per erythrocyte)      
      33 pg            25-34        

 

 Automated erythrocyte mean corpuscular hemoglobin concentration measurement (
mass/volume)            35 g/dL            32-36        

 

 Automated erythrocyte distribution width ratio            12.1 %            
10.0-14.5        

 

 Automated blood platelet count (count/volume)            224 10*3/uL          
  130-400        

 

 Automated blood platelet mean volume measurement            10.0 [foz_us]     
       7.4-10.4        

 

 Automated blood neutrophils/100 leukocytes            61 %            42-75   
     

 

 Automated blood lymphocytes/100 leukocytes            29 %            12-44   
     

 

 Blood monocytes/100 leukocytes            8 %            0-12        

 

 Automated blood eosinophils/100 leukocytes            2 %            0-10     
   

 

 Automated blood basophils/100 leukocytes            1 %            0-10        

 

 Blood neutrophils automated count (number/volume)            3.9 10*3         
   1.8-7.8        

 

 Blood lymphocytes automated count (number/volume)            1.9 10*3         
   1.0-4.0        

 

 Blood monocytes automated count (number/volume)            0.5 10*3            
0.0-1.0        

 

 Automated eosinophil count            0.1 10*3/uL            0.0-0.3        

 

 Automated blood basophil count (count/volume)            0.0 10*3/uL          
  0.0-0.1        









 PT panel in platelet poor plasma by coagulation assay - 17 19:21         









 Prothrombin time (PT) in platelet poor plasma by coagulation assay            
13.4 s            12.2-14.7        

 

 INR in platelet poor plasma or blood by coagulation assay            1.1      
       0.8-1.4        









 Activated partial thromboplastin time (aPTT) in platelet poor plasma 
bycoagulation assay - 17 19:21         









 Activated partial thromboplastin time (aPTT) in platelet poor plasma 
bycoagulation assay            29 s            24-35        









 Comprehensive metabolic panel - 17 19:21         









 Serum or plasma sodium measurement (moles/volume)            126 mmol/L       
     135-145        

 

 Serum or plasma potassium measurement (moles/volume)            4.5 mmol/L    
        3.6-5.0        

 

 Serum or plasma chloride measurement (moles/volume)            92 mmol/L      
              

 

 Carbon dioxide            23 mmol/L            21-32        

 

 Serum or plasma anion gap determination (moles/volume)            11 mmol/L   
         5-14        

 

 Serum or plasma urea nitrogen measurement (mass/volume)            10 mg/dL   
         7-18        

 

 Serum or plasma creatinine measurement (mass/volume)            0.76 mg/dL    
        0.60-1.30        

 

 Serum or plasma urea nitrogen/creatinine mass ratio            13             
NRG        

 

 Serum or plasma creatinine measurement with calculation of estimated 
glomerular filtration rate            >             NRG        

 

 Serum or plasma glucose measurement (mass/volume)            117 mg/dL        
            

 

 Serum or plasma calcium measurement (mass/volume)            8.7 mg/dL        
    8.5-10.1        

 

 Serum or plasma total bilirubin measurement (mass/volume)            1.0 mg/dL
            0.1-1.0        

 

 Serum or plasma alkaline phosphatase measurement (enzymatic activity/volume)  
          94 U/L                    

 

 Serum or plasma aspartate aminotransferase measurement (enzymatic activity/
volume)            25 U/L            5-34        

 

 Serum or plasma alanine aminotransferase measurement (enzymatic activity/volume
)            15 U/L            0-55        

 

 Serum or plasma protein measurement (mass/volume)            7.5 g/dL         
   6.4-8.2        

 

 Serum or plasma albumin measurement (mass/volume)            4.1 g/dL         
   3.2-4.5        









 Magnesium - 17 19:21         









 Magnesium            1.7 mg/dL            1.8-2.4        









 Serum or plasma creatine kinase measurement (enzymatic activity/volume) -  19:21         









 Serum or plasma creatine kinase measurement (enzymatic activity/volume)       
     37 U/L                    









 Serum or plasma creatine kinase MB measurement (enzymatic activity/volume) -  19:21         









 Serum or plasma creatine kinase MB measurement (enzymatic activity/volume)    
        1.2 ng/mL            <6.6        









 Serum or plasma lithium measurement (moles/volume) - 17 19:21         









 BNP level            423.5 pg/mL            <100.0        









 Serum or plasma troponin i.cardiac measurement (mass/volume) - 17 19:21 
        









 Serum or plasma troponin i.cardiac measurement (mass/volume)            < ng/
mL            <0.30        









 Serum or plasma thyrotropin measurement by detection limit <=0.05 miu/l (units/
volume) - 17 19:21         









 Serum or plasma thyrotropin measurement by detection limit <=0.05 miu/l (units/
volume)            3.40 u[iU]/mL            0.35-4.94        









 Serum or plasma ethanol measurement (mass/volume) - 17 19:21         









 Serum or plasma ethanol measurement (mass/volume)            < mg/dL          
  <10        









 Complete urinalysis with reflex to culture - 17 19:50         









 Urine color determination            YELLOW             NRG        

 

 Urine clarity determination            CLEAR             NRG        

 

 Urine pH measurement by test strip            6.5             5-9        

 

 Specific gravity of urine by test strip            1.010             1.016-
1.022        

 

 Urine protein assay by test strip, semi-quantitative            NEGATIVE      
       NEGATIVE        

 

 Urine glucose detection by automated test strip            NEGATIVE           
  NEGATIVE        

 

 Erythrocytes detection in urine sediment by light microscopy            
NEGATIVE             NEGATIVE        

 

 Urine ketones detection by automated test strip            NEGATIVE           
  NEGATIVE        

 

 Urine nitrite detection by test strip            NEGATIVE             NEGATIVE
        

 

 Urine total bilirubin detection by test strip            NEGATIVE             
NEGATIVE        

 

 Urine urobilinogen measurement by automated test strip (mass/volume)          
  NORMAL             NORMAL        

 

 Urine leukocyte esterase detection by dipstick            1+             
NEGATIVE        

 

 Automated urine sediment erythrocyte count by microscopy (number/high power 
field)            NONE             NRG        

 

 Automated urine sediment leukocyte count by microscopy (number/high power field
)             [HPF]            NRG        

 

 Bacteria detection in urine sediment by light microscopy            NONE      
       NRG        

 

 Squamous epithelial cells detection in urine sediment by light microscopy     
       0-2             NRG        

 

 Crystals detection in urine sediment by light microscopy            NONE      
       NRG        

 

 Casts detection in urine sediment by light microscopy            NONE         
    NRG        

 

 Mucus detection in urine sediment by light microscopy            NEGATIVE     
        NRG        

 

 Complete urinalysis with reflex to culture            NO             NRG      
  









 CBC With Differential/Platelet - 10/06/17 09:05         









 WBC            5.3 x10E3/uL            3.4-10.8        

 

 RBC            3.95 x10E6/uL            3.77-5.28        

 

 Hemoglobin            13.0 g/dL            11.1-15.9        

 

 Hematocrit            38.5 %            34.0-46.6        

 

 MCV            98 fL            79-97        

 

 MCH            32.9 pg            26.6-33.0        

 

 MCHC            33.8 g/dL            31.5-35.7        

 

 RDW            12.7 %            12.3-15.4        

 

 Platelets            232 x10E3/uL            150-379        

 

 Neutrophils            46 %            Not Estab.        

 

 Lymphs            39 %            Not Estab.        

 

 Monocytes            8 %            Not Estab.        

 

 Eos            6 %            Not Estab.        

 

 Basos            1 %            Not Estab.        

 

 Neutrophils (Absolute)            2.4 x10E3/uL            1.4-7.0        

 

 Lymphs (Absolute)            2.1 x10E3/uL            0.7-3.1        

 

 Monocytes(Absolute)            0.4 x10E3/uL            0.1-0.9        

 

 Eos (Absolute)            0.3 x10E3/uL            0.0-0.4        

 

 Baso (Absolute)            0.1 x10E3/uL            0.0-0.2        

 

 Immature Granulocytes            0 %            Not Estab.        

 

 Immature Grans (Abs)            0.0 x10E3/uL            0.0-0.1        









 Comp. Metabolic Panel (14) - 10/06/17 09:05         









 Glucose, Serum            89 mg/dL            65-99        

 

 BUN            8 mg/dL            8-27        

 

 Creatinine, Serum            0.72 mg/dL            0.57-1.00        

 

 eGFR If NonAfricn Am            86 mL/min/1.73                >59        

 

 eGFR If Africn Am            99 mL/min/1.73                >59        

 

 BUN/Creatinine Ratio            11             12-28        

 

 Sodium, Serum            130 mmol/L            134-144        

 

 Potassium, Serum            4.8 mmol/L            3.5-5.2        

 

 Chloride, Serum            90 mmol/L                    

 

 Carbon Dioxide, Total            25 mmol/L            18-29        

 

 Calcium, Serum            9.2 mg/dL            8.7-10.3        

 

 Protein, Total, Serum            7.4 g/dL            6.0-8.5        

 

 Albumin, Serum            4.5 g/dL            3.6-4.8        

 

 Globulin, Total            2.9 g/dL            1.5-4.5        

 

 A/G Ratio            1.6             1.2-2.2        

 

 Bilirubin, Total            0.6 mg/dL            0.0-1.2        

 

 Alkaline Phosphatase, S            108 IU/L                    

 

 AST (SGOT)            26 IU/L            0-40        

 

 ALT (SGPT)            14 IU/L            0-32        









 Lipid Panel - 10/06/17 09:05         









 Cholesterol, Total            171 mg/dL            100-199        

 

 Triglycerides            155 mg/dL            0-149        

 

 HDL Cholesterol            63 mg/dL            >39        

 

 VLDL Cholesterol Jhon            31 mg/dL            5-40        

 

 LDL Cholesterol Calc            77 mg/dL            0-99        









 Microalb/Creat Ratio, Randm Ur - 10/06/17 09:05         









 Creatinine, Urine            65.6 mg/dL            Not Estab.        

 

 Microalbumin, Urine            5.5 ug/mL            Not Estab.        

 

 Microalb/Creat Ratio            8.4 mg/g creat            0.0-30.0        









 Hemoglobin A1c - 10/06/17 09:05         









 Hemoglobin A1c            5.1 %            4.8-5.6        









 CMP - 10/06/17 09:05         









 Glucose, Serum            89 mg/dL            65-99        

 

 BUN            8 mg/dL            8-27        

 

 Creatinine, Serum            0.72 mg/dL            0.57-1.00        

 

 eGFR If NonAfricn Am            86 mL/min/1.73                >59        

 

 eGFR If Africn Am            99 mL/min/1.73                >59        

 

 BUN/Creatinine Ratio            11             12-28        

 

 Sodium, Serum            130 mmol/L            134-144        

 

 Potassium, Serum            4.8 mmol/L            3.5-5.2        

 

 Chloride, Serum            90 mmol/L                    

 

 Carbon Dioxide, Total            25 mmol/L            18-29        

 

 Calcium, Serum            9.2 mg/dL            8.7-10.3        

 

 Protein, Total, Serum            7.4 g/dL            6.0-8.5        

 

 Albumin, Serum            4.5 g/dL            3.6-4.8        

 

 Globulin, Total            2.9 g/dL            1.5-4.5        

 

 A/G Ratio            1.6             1.2-2.2        

 

 Bilirubin, Total            0.6 mg/dL            0.0-1.2        

 

 Alkaline Phosphatase, S            108 IU/L                    

 

 AST (SGOT)            26 IU/L            0-40        

 

 ALT (SGPT)            14 IU/L            0-32        









 CMP - 18 09:37         









 GLUCOSE            110 mg/dL            65-99        

 

 UREA NITROGEN (BUN)            8 mg/dL            7-25        

 

 CREATININE            0.77 mg/dL            0.50-0.99        

 

 eGFR NON-AFR. AMERICAN            79 mL/min/1.73m2            > OR=60        

 

 eGFR             91 mL/min/1.73m2            > OR=60        

 

 BUN/CREATININE RATIO            NOT APPLICABLE (calc)            6-22        

 

 SODIUM            137 mmol/L            135-146        

 

 POTASSIUM            4.3 mmol/L            3.5-5.3        

 

 CHLORIDE            100 mmol/L                    

 

 CARBON DIOXIDE            29 mmol/L            20-31        

 

 CALCIUM            9.1 mg/dL            8.6-10.4        

 

 PROTEIN, TOTAL            7.3 g/dL            6.1-8.1        

 

 ALBUMIN            4.1 g/dL            3.6-5.1        

 

 GLOBULIN            3.2 g/dL (calc)            1.9-3.7        

 

 ALBUMIN/GLOBULIN RATIO            1.3 (calc)            1.0-2.5        

 

 BILIRUBIN, TOTAL            0.8 mg/dL            0.2-1.2        

 

 ALKALINE PHOSPHATASE            95 U/L                    

 

 AST            20 U/L            10-35        

 

 ALT            12 U/L            6-29        



                                                    



Encounters

      





 ACCT No.            Visit Date/Time            Discharge            Status    
        Pt. Type            Provider            Facility            Loc./Unit  
          Complaint        

 

 [id number]            2015 09:24:00            2015 23:59:59          
  [facility]            Outpatient            BRANDON CHIN MD                          
                     

 

 [id number]            2014 10:56:00            2014 23:59:59          
  [facility]            Outpatient            ILIR BORGES DO                        
                       

 

 [id number]            2014 10:34:00            2014 23:59:59          
  [facility]            Outpatient            VADIM MCGARRY                  
                             

 

 [id number]            2013 14:57:00            2013 23:59:59          
  [facility]            Outpatient            ILIR BORGES DO                        
                       

 

 [id number]            2013 08:47:00            2013 23:59:59          
  [facility]            Outpatient            ILIR BORGES DO                        
                       

 

 [id number]            2013 15:35:00            2013 23:59:59          
  [facility]            Outpatient            ILIR BORGES DO                        
                       

 

 [id number]            2013 15:55:00            2013 23:59:59          
  [facility]            Outpatient            VADIM MCGARRY                  
                             

 

 [id number]            2012 13:46:00            2012 23:59:59          
  [facility]            Outpatient                                                    
        

 

 [id number]            2012 12:07:00            2012 23:59:59            
[facility]            Outpatient                                                      
      

 

 [id number]            10/07/2017 12:07:00                                   
   Document Registration                                                       
     

 

 [id number]            2018 07:46:00            2018 08:45:00    
        DIS            Outpatient            DARCIE GONZALEZ MD            Via 
[facility]            CATARACT RIGHT EYE      
  

 

 [id number]            2018 06:06:00            2018 10:12:00    
        DIS            Outpatient            DARCIE GONZALEZ MD            Via 
[facility]            PREOP            CATARACT RIGHT EYE    
    

 

 [id number]            2018 07:50:00            2018 09:24:00    
        DIS            Outpatient            DARCIE GONZALEZ MD            Via 
[facility]            CATARACT LEFT EYE        

 

 [id number]            2018 05:51:00            2018 23:59:59    
        [facility]            Outpatient            DARCIE GONZALEZ MD            Via 
[facility]            PREOP            CATARACT LEFT EYE     
   

 

 [id number]            2018 10:51:00            2018 23:59:59    
        [facility]            Outpatient            BRANDON CHIN MD, FACC, FACP CCDS     
       [facility]            CARD            AS,PAF,
CAROTID ARTERY NARROWING        

 

 [id number]            2018 12:30:00            2018 23:59:59    
        [facility]            Preadmit            BRANDON CHIN MD, FACC, FACP CCDS       
     [facility]            CARD            AS,PAF,CAROTID 
ARTERY NARROWING        

 

 [id number]            2017 16:36:00            2017 21:30:00    
        DIS            Emergency            MATTHEW DOMÍNGUEZ DO            Via 
[facility]            ER            BP PROBLEMS        

 

 [id number]            2016 11:36:00            2016 11:13:00    
        DIS            Inpatient            ROMANA DIA MD            Via 
[facility]            4TH            FALL RT RIB CONTUSION 
POSS KIDNEY CONTUSION        

 

 [id number]            10/09/2015 14:04:00            10/09/2015 15:15:00    
        DIS            Emergency            PETER POTTER APRN            Via 
[facility]            ER            L ANKLE PAIN        

 

 [id number]            2015 08:49:00            04/15/2015 20:17:00    
        DIS            Outpatient            BRANDON CHIN MD, FACC, FACP CCDS     
       [facility]            CATH            AORTIC 
STENOSIS, ELEVATED ARTERY PRESSURE        

 

 [id number]            2015 11:00:00            2015 23:59:59    
        [facility]            Outpatient            BRANDON CHIN MD, FACC, FACP CCDS     
       [facility]            CARD            CP,MICHELLE,
MURMUR        

 

 [id number]            2012 12:24:00                                   
   Document Registration                                                       
     

 

 [id number]            10/09/2015 14:04:30                         ACT            
Document Registration                                                          
  

 

 [id number]            2018 10:20:00            2018 23:59:59            
[facility]            Outpatient            STEVE CRESPO                         
[facility]                     

 

 [id number]            2018 10:20:00                                      
Document Registration                                                          
  

 

 [id number]            10/06/2017 09:00:00                                      
Document Registration

## 2019-02-17 NOTE — XMS REPORT
[facility]

 Created on: 2018



Radha Hardin

External Reference #: [id number]

: 1950

Sex: Female



Demographics







 Address  [address] [place]  [address]

 

 Preferred Language  Unknown

 

 Marital Status  Unknown

 

 Shinto Affiliation  Unknown

 

 Race  Unknown

 

 Ethnic Group  Unknown





Author







 Author  STEVE CRESPO

 

 Organization  [facility]

 

 Address  [address] [place]  [address]



 

 Phone  [phone]







Care Team Providers







 Care Team Member Name  Role  Phone

 

 STEVE CRESPO  Unavailable  [phone]







PROBLEMS







 Type  Condition  ICD9-CM Code  UKE01-VR Code  Onset Dates  Condition Status  
SNOMED Code

 

 Problem  Mixed hyperlipidemia     E78.2     Active  144984108

 

 Problem  Coronary artery disease involving native coronary artery of native 
heart without angina pectoris     I25.10     Active  [id number]

 

 Problem  Essential hypertension     I10     Active  35982769

 

 Problem  Hyponatremia     E87.1     Active  17784736

 

 Problem  Flexural eczema     L20.82     Active  03667754







ALLERGIES

No Information



ENCOUNTERS







 Encounter  Location  Date  Diagnosis

 

 [place] [address] [address]  14 May, 2018   

 

 [facility]  [address] [address]  08 May, 2018  Essential hypertension I10

 

 [place] [address] [address]-
[address]     

 

 [place] [address] [address]-
2143    Essential hypertension I10 ; Coronary artery disease 
involving native coronary artery of native heart without angina pectoris I25.10 
; Blurry vision H53.8 ; Mixed hyperlipidemia E78.2 ; Systolic murmur R01.1 and 
Encounter for immunization Z23

 

 [facility]  [address] [address]-
[address]     

 

 [place] [address] [address]-
0522    Essential hypertension I10

 

 [place] [address] [address]-
[address]     

 

 [facility]  [address]65[address], [place] [address]  06 Oct, 2017  Essential hypertension I10 ; Screening for diabetes 
mellitus Z13.1 and Screening for lipid disorders Z13.220

 

 [facility]  [address]65[place] [address]-
3746  05 Oct, 2017   

 

 [facility]  [address] [place] [id number] [place] [address]-
4429  04 Oct, 2017  Essential hypertension I10

 

 [place] [address] [place] [id number] [place] [address]-
5045  21 Sep, 2017  Essential hypertension I10 ; Coronary artery disease 
involving native coronary artery of native heart without angina pectoris I25.10 
; Screening for diabetes mellitus Z13.1 and Screening for lipid disorders 
Z13.220

 

 [facility]  [address] [id number]    Essential hypertension I10

 

 [facility]  [address] [id number]  19 May, 
2017   

 

 [facility]  [address] [id number]    Essential hypertension I10

 

 [facility]  [address] [id number]  22 Mar, 
2017  Essential hypertension I10

 

 [facility]  [address] [id number]    Essential hypertension I10

 

 [facility]  [address] [id number]    Essential hypertension I10

 

 [facility]  [address] [id number]    Essential hypertension I10 ; Cardiac murmur, previously undiagnosed R01.1 
and Encounter for immunization Z23

 

 [facility]  [address] [id number]  16 May, 
2016  Essential hypertension I10

 

 [facility]  [address] [id number]  13 2016  Essential hypertension I10 and Hyponatremia E87.1

 

 [facility]  [address] [address]-
[address]     

 

 [facility]  [id number] [id number]  22 Mar, 
2016  Essential hypertension I10 and Flexural eczema L20.82

 

 [facility]  [id number] [address]    Essential hypertension I10 and Flexural eczema L20.82

 

 [facility]  [id number] [id number]    Essential hypertension I10

 

 [facility]  [address] [place] [id number]    Essential hypertension I10

 

 [address] [place] [id number]  29 Oct, 
2015  Encounter for immunization Z23 ; Occlusion and stenosis of unspecified 
carotid artery I65.29 and Essential (primary) hypertension I10

 

 [address] [id number]  22 Oct, 
2015  Sprain of other ligament of left ankle, initial encounter S93.492A and 
Essential hypertension I10

 

 [facility]  [id number][place] [id number]  
18 Sep, 2015   

 

 [facility]  [address]65100[place] [address]-
[address]     

 

 [facility]  [address] [place] [address]-
60[address]     

 

 [facility]  [address] [place] [id number] [place] [address]-
3[address]     

 

 [facility]  [address] [place] [address]-
[address]     

 

 [facility]  [id number][place] [id number]     

 

 [facility]  [address] [place] [address]
2546     

 

 [facility] [place]  [address] [id number] [place] [id number]     

 

 [facility]  [address] [id number] [address]-
[address]     

 

 [facility] [place]  [address] [id number] [place] [id number]     

 

 [facility]  [address] [facility] [id number], [place] [address]-
[address]     

 

 [facility] [place]  [address] [id number][place] [id number]     

 

 [facility]  [address] [facility] [id number], [place] [address]-
[address]     

 

 [facility] [place]  [address] [id number][place] [id number]  26 Dec, 
2013   

 

 [facility]  [address] [facility] [id number], [place] [address]-
[address]676  26 Dec, 2013   

 

 [facility]  [address] [facility] [id number], [place] [address]-
[address]     

 

 [facility] [place]  [address] [id number] [place] [address]     

 

 [facility] [place]  [address] [id number], [place] [id number]  24 Sep, 
2013   

 

 [facility] [place]  [address] [id number][place] [id number]  21 Sep, 
2013   

 

 [facility]  [address] [facility] [id number], [place] [address]-
[address]  20 Sep, 2013   

 

 [facility] [place]  [address] [id number][place] [id number]  26 Aug, 
2013   

 

 [facility] [place]  [address] [id number] [place] [id number]  25 Mar, 
2013   

 

 [facility] [place]  [address] [id number][place] [id number]  12 Dec, 
2012   

 

 [facility]  [address] [facility] [id number], [place] [address]-
[address]326  12 Dec, 2012   

 

 [facility] [place]  [address] [id number] [place] [address]     

 

 [facility]  [address] [facility] [id number], [place] [address]-
[address]     

 

 [facility] [place]  [address] [id number] [place] [id number]  25 Sep, 
2012   

 

 [facility] [place]  [address] [id number] [place] [id number]  20 Aug, 
2012   

 

 [facility] [place]  [address] [id number] [place] [address]  06 Aug, 
2012   

 

 [facility] [place]  [address] [id number] [place] [address]     

 

 [facility] [place]  [address] [id number] [place] [id number]     

 

 [facility] [place]  [address] [id number] [place] [address]     

 

 [facility] [place]  [address] [id number] [place] [address]  18 May, 
2012   

 

 [facility] [place]  [address] [id number] [place] [id number]  09 Mar, 
2012   

 

 [facility] [place]  [address] [id number] [place] [id number]  07 Mar, 
2012   

 

 [facility] [place]  [address] [id number] [place] [address]     

 

 [facility] [place]  [address] [id number] [place] [address]     

 

 [facility] [place]  [address] [id number] [place] [address]     

 

 [facility]  [address] [facility] [id number], [place] [address]-
[address]  20 Dec, 2010   

 

 [facility]  [address] [facility] [id number], [place] [address]-
[address]  20 Dec, 2010   

 

 [facility]  [address] [place] [id number] [address]  20 Oct, 2010   

 

 [facility]  [address]65100[place], [place] [address]-
[address]  22 Oct, 2009   

 

 [facility]  [address] [facility] [id number] [place] [address]-
3568  18 Sep, 2009   







IMMUNIZATIONS

No Known Immunizations



SOCIAL HISTORY

Never Assessed



REASON FOR VISIT

Refill request



PLAN OF CARE





VITAL SIGNS





MEDICATIONS







 Medication  Instructions  Dosage  Frequency  Start Date  End Date  Duration  
Status

 

 Lisinopril 20 mg  Orally 2 times a day  1 tablet  12h        30 days  Active

 

 Amlodipine Besylate 5MG  Orally Once a day at hs  1 tablet              Active







RESULTS

No Results



PROCEDURES

No Known procedures



INSTRUCTIONS





MEDICATIONS ADMINISTERED

No Known Medications



MEDICAL (GENERAL) HISTORY







 Type  Description  Date

 

 Medical History  heart murmur   

 

 Medical History  hypertension   

 

 Medical History  anemia   

 

 Medical History  Thyroid disorder   

 

 Surgical History  tubal ligation  1985

 

 Hospitalization History  childbirth   

 

 Hospitalization History  fall rt rib contusion/possible kidney contusion

## 2019-02-17 NOTE — XMS REPORT
[facility]

 Created on: 2016



Radha Hardin

External Reference #: [id number]

: 1950

Sex: Female



Demographics







 Address  [address]

[place]  [address]

 

 Home Phone  [phone]

 

 Preferred Language  Unknown

 

 Marital Status  Unknown

 

 Church Affiliation  Unknown

 

 Race  White

 

 Ethnic Group  Not  or 





Author







 Author  VADIM AVILA

 

 Organization  [employer]

 

 Address  Unknown

 

 Phone  Unavailable







Care Team Providers







 Care Team Member Name  Role  Phone

 

 VADIM AVILA  CP  Unavailable



                                                                



Allergies, Adverse Reactions, Alerts

          





 Substance  Reaction  Event Type

 

 Penicillin V Potassium  swelling  Drug Allergy



                                                                               
         



Problems

          





 Problem Type  Condition  Code  Onset Dates  Condition Status

 

 Problem  Place of occurrence, home  E849.0     Active

 

 Problem  Foot and toe(s), blister, without mention of infection  917.2     
Active

 

 Problem  Unspecified myalgia and myositis  729.1     Active

 

 Problem  Undiagnosed cardiac murmurs  785.2     Active

 

 Problem  Need for prophylactic vaccination and inoculation, Influenza  V04.81 
    Active

 

 Problem  Essential hypertension  I10     Active

 

 Problem  Pain in joint, shoulder region  719.41     Active

 

 Problem  Acute bronchitis  466.0     Active

 

 Problem  Sprain and strain of unspecified site of back  847.9     Active

 

 Problem  Insomnia, unspecified  780.52     Active

 

 Assessment  Essential hypertension  I10     Active

 

 Problem  Occlusion and stenosis of carotid artery without mention of cerebral 
infarction  433.10     Active

 

 Problem  Chest pain, unspecified  786.50     Active

 

 Problem  Lumbago  724.2     Active



                                                                               
                                                                               
                                                            



Medications

          





 Medication  Code System  Code  Instructions  Start Date  End Date  Status  
Dosage

 

 Lisinopril  NDC  [id number]  20MG Orally 2 times a day           1 tablet



                                                                               
         



Procedures

          





 Procedure  Coding System  Code  Date

 

 Office Visit, Est Pt., Level 3  CPT-4  30045  2016

 

 [facility] VISIT ESTABLISHED PATIENT  CPT-4    2016



                                                                               
                             



Vital Signs

          





 Date/Time:  2016

 

 Temperature  97.9 F

 

 Weight  147.2 lbs

 

 Height  63 in

 

 BMI  26.07 Index

 

 Blood Pressure Diastolic  80 mmHg

 

 Blood Pressure Systolic  152 mmHg

 

 Cardiac Monitoring Heart Rate  76 bpm



                                                                              



Results

          No Known Results                                                     
               



Summary Purpose

          [employer] Submission

## 2019-02-17 NOTE — XMS REPORT
[facility]

 Created on: 2016



Radha Hardin

External Reference #: [id number]

: 1950

Sex: Female



Demographics







 Address  [address] [place]  [address]

 

 Home Phone  [phone]

 

 Preferred Language  Unknown

 

 Marital Status  Unknown

 

 Restorationist Affiliation  Unknown

 

 Race  White

 

 Ethnic Group  Not  or 





Author







 Author  VADIM AVILA

 

 [facility]  eClinicalWorks

 

 Address  Unknown

 

 Phone  Unavailable







Care Team Providers







 Care Team Member Name  Role  Phone

 

 VADIM AVILA  CP  Unavailable



                                                                



Allergies, Adverse Reactions, Alerts

          





 Substance  Reaction  Event Type

 

 Penicillin V Potassium  swelling  Drug Allergy



                                                                               
         



Problems

          





 Problem Type  Condition  Code  Onset Dates  Condition Status

 

 Problem  Foot and toe(s), blister, without mention of infection  917.2     
Active

 

 Problem  Pain in joint, shoulder region  719.41     Active

 

 Problem  Acute bronchitis  466.0     Active

 

 Problem  Flexural eczema  L20.82     Active

 

 Problem  Essential hypertension  I10     Active

 

 Problem  Hyponatremia  E87.1     Active

 

 Problem  Sprain and strain of unspecified site of back  847.9     Active

 

 Problem  Insomnia, unspecified  780.52     Active

 

 Problem  Undiagnosed cardiac murmurs  785.2     Active

 

 Problem  Need for prophylactic vaccination and inoculation, Influenza  V04.81 
    Active

 

 Assessment  Hyponatremia  E87.1     Active

 

 Problem  Chest pain, unspecified  786.50     Active

 

 Problem  Lumbago  724.2     Active

 

 Assessment  Essential hypertension  I10     Active

 

 Problem  Place of occurrence, home  E849.0     Active

 

 Problem  Occlusion and stenosis of carotid artery without mention of cerebral 
infarction  433.10     Active

 

 Problem  Unspecified myalgia and myositis  729.1     Active



                                                                               
                                                                               
                                                                               
           



Medications

          





 Medication  Code System  Code  Instructions  Start Date  End Date  Status  
Dosage

 

 Triamcinolone Acetonide  NDC  [address]-15  0.1 % Externally Twice a day  2016        1 application to affected area

 

 Omega 3  NDC  21784-14960  1000 MG Orally Once a day           1 capsule

 

 Lisinopril  NDC  [address]-13  20 mg Orally 2 times a day           1 tablet

 

 Aspir-Low  NDC  [address]-61  81 MG Orally Once a day           1 tablet

 

 Acetaminophen  NDC  [address]-93  500 MG Orally every  hours as needed       
    1-2 tablets



                                                                               
                                                 



Procedures

          





 Procedure  Coding System  Code  Date

 

 Office Visit, Est Pt., Level 3  CPT-4  71212  2016

 

 [facility]  [facility]T-[facility]  NOBLL  2016

 

 [facility] VISIT ESTABLISHED PATIENT  CPT-4    2016

 

 VENIPUNCT, ROUTINE*  CPT-4  53185  2016



                                                                               
                                                 



Vital Signs

          





 Date/Time:  2016

 

 Temperature  98.2 F

 

 Weight  150 lbs

 

 Height  63 in

 

 BMI  26.57 Index

 

 Blood Pressure Diastolic  90 mmHg

 

 Blood Pressure Systolic  140 mmHg

 

 Cardiac Monitoring Heart Rate  77 bpm



                                                                    



Results

          





 Name  Result  Date  Reference Range  Unit  Abnormality Flag

 

 ROUTINE VENIPUNCTURE               

 

 BMP               

 

 ----Calcium, Serum  9.5  2016  8.7-10.3   mg/dL   

 

 ----Sodium, Serum  135  [id number]  134-144   mmol/L   

 

 ----BUN/Creatinine Ratio  16  2016  11-26       

 

 ----Chloride, Serum  95  75167795     mmol/L  L

 

 ----Carbon Dioxide, Total  22  2016  18-29   mmol/L   

 

 ----Potassium, Serum  4.3  54131626  3.5-5.2   mmol/L   

 

 ----eGFR If NonAfricn Am  94  66743687      >59   mL/min/1.73   

 

 ----eGFR If Africn Am  108  63271773      >59   mL/min/1.73   

 

 ----BUN  10  98675768  8-27   mg/dL   

 

 ----Creatinine, Serum  0.61  49491011  0.57-1.00   mg/dL   

 

 ----Glucose, Serum  112  17778711  65-99   mg/dL  H



                                                                              



Summary Purpose

          eClinicalWorks Submission

## 2019-02-17 NOTE — XMS REPORT
[facility]

 Created on: 2018



Radha Hardin

External Reference #: [id number]

: 1950

Sex: Female



Demographics







 Address  [address] [place]  [address]

 

 Preferred Language  Unknown

 

 Marital Status  Unknown

 

 Druze Affiliation  Unknown

 

 Race  Unknown

 

 Ethnic Group  Unknown





Author







 Author  STEVE CRESPO

 

 Organization  [facility]

 

 Address  [address] [place]  [address]



 

 Phone  [phone]







Care Team Providers







 Care Team Member Name  Role  Phone

 

 STEVE CRESPO  Unavailable  [phone]







PROBLEMS







 Type  Condition  ICD9-CM Code  POG55-GS Code  Onset Dates  Condition Status  
SNOMED Code

 

 Problem  Mixed hyperlipidemia     E78.2     Active  665249240

 

 Problem  Coronary artery disease involving native coronary artery of native 
heart without angina pectoris     I25.10     Active  [id number]

 

 Problem  Essential hypertension     I10     Active  77400706

 

 Problem  Hyponatremia     E87.1     Active  07216615

 

 Problem  Flexural eczema     L20.82     Active  80414195







ALLERGIES

No Information



ENCOUNTERS







 Encounter  Location  Date  Diagnosis

 

 [facility]  [address] [place] [phone]
[phone]     

 

 [facility]  [id number] [place] [address]-
4555    Essential hypertension I10 ; Coronary artery disease 
involving native coronary artery of native heart without angina pectoris I25.10 
; Blurry vision H53.8 ; Mixed hyperlipidemia E78.2 ; Systolic murmur R01.1 and 
Encounter for immunization Z23

 

 [place] [address] [place] [phone]-
9711     

 

 [facility]  [address] [place] [address]26-
5923    Essential hypertension I10

 

 [id number] [place] [address] 56474-
0480     

 

 [place] [id number] [place] [address]-
0733  06 Oct, 2017  Essential hypertension I10 ; Screening for diabetes 
mellitus Z13.1 and Screening for lipid disorders Z13.220

 

 [place] [address] [id number] [address]-
7734  05 Oct, 2017   

 

 [facility]  [address] [place] [address]-
5913  04 Oct, 2017  Essential hypertension I10

 

 [facility]  [address] [address]-
5[address]  21 Sep, 2017  Essential hypertension I10 ; Coronary artery disease 
involving native coronary artery of native heart without angina pectoris I25.10 
; Screening for diabetes mellitus Z13.1 and Screening for lipid disorders 
Z13.220

 

 [facility]  [id number] [place] [address] [phone]    Essential hypertension I10

 

 [facility]  [address] [id number]  19 May, 
2017   

 

 [facility]  [address] [id number]    Essential hypertension I10

 

 [facility]  [address] [id number]  22 Mar, 
2017  Essential hypertension I10

 

 [facility]  [id number] [place] [address] [phone]    Essential hypertension I10

 

 [facility]  [address] [phone]    Essential hypertension I10

 

 [facility]  [id number] [place] [address] [phone]    Essential hypertension I10 ; Cardiac murmur, previously undiagnosed R01.1 
and Encounter for immunization Z23

 

 [facility]  [id number] [place] [address] [phone]  16 May, 
2016  Essential hypertension I10

 

 [facility]  [address] [id number]  13 2016  Essential hypertension I10 and Hyponatremia E87.1

 

 [facility]  [address] [address]-
[address]     

 

 [facility]  [id number] [place] [address] [id number]  22 Mar, 
2016  Essential hypertension I10 and Flexural eczema L20.82

 

 [facility]  [id number] [place] [address] [id number]    Essential hypertension I10 and Flexural eczema L20.82

 

 [facility]  [id number] [place] [address] [phone]    Essential hypertension I10

 

 [facility]  [id number] [place] [address] [phone]    Essential hypertension I10

 

 [place] [id number] [place] [address] [phone]  29 Oct, 
2015  Encounter for immunization Z23 ; Occlusion and stenosis of unspecified 
carotid artery I65.29 and Essential (primary) hypertension I10

 

 [place] [id number] [place] [address] [id number]  22 Oct, 
2015  Sprain of other ligament of left ankle, initial encounter S93.492A and 
Essential hypertension I10

 

 [facility]  [id number] [place] [address] [phone]  
18 Sep, 2015   

 

 [facility]  [id number] [place] [address] [phone]
[phone]     

 

 [facility]  [address] [phone]
[phone]     

 

 [facility]  [address] [phone]
[phone]     

 

 [facility]  [id number] [place] [address] [phone]-
[phone]     

 

 [facility]  [id number] [place] [address] [phone]     

 

 [facility]  [id number] [place] [address] [phone]
[phone]     

 

 [place] [id number] [place] [address] [phone]     

 

 [facility]  [id number] [place] [address] 72[address]-
6415     

 

 [place] [id number] [place] [address] [phone]     

 

 [facility] [place] [facility]  [address] [facility] [id number] [address]-
9713     

 

 [facility]  [address] [id number] [place] [phone]     

 

 [facility] [place] [facility]  [address] [facility] [id number] [address]-
5080     

 

 [facility]  [address] [id number] [place] [phone]  26 Dec, 
2013   

 

 [facility]  [address] [facility] [id number] [place] [address]-
6440  26 Dec, 2013   

 

 [facility] [place] [facility]  [address] [facility] [id number] [place] [address]-
6177     

 

 [facility]  [address] [id number], [place] [phone]     

 

 [facility]  [address] [id number][place] [phone]  24 Sep, 
2013   

 

 [facility]  [address] [id number][place] [phone]  21 Sep, 
2013   

 

 [facility] [place]  [address] [facility] [id number][place] [address]-
2082  20 Sep, 2013   

 

 [facility]  [address] [id number][place] [id number]  26 Aug, 
2013   

 

 [facility]  [address] [id number][place] [phone]  25 Mar, 
2013   

 

 [facility]  [address] [id number][place] [phone]  12 Dec, 
2012   

 

 [facility] [place] [facility]  [address] [id number][place] [address]-
39[address]  12 Dec, 2012   

 

 [facility]  [address] [id number], [place] [phone]     

 

 [facility] [place] [facility]  [address] [facility] [id number][place] [address]-
3129     

 

 [facility]  [address] [id number][place] [id number]  25 Sep, 
2012   

 

 [facility]  [address] [id number] [place] [phone]  20 Aug, 
2012   

 

 [facility]  [address] [id number] [place] [phone]  06 Aug, 
2012   

 

 [facility]  [address] [id number] [place] [phone]     

 

 [facility]  [address] [id number] [place] [phone]     

 

 [facility]  [address] [id number] [place] [phone]     

 

 [facility]  [address] [id number] [place] [phone]  18 May, 
2012   

 

 [facility]  [address] [id number] [place] [phone]  09 Mar, 
2012   

 

 [facility]  [address] [id number] [place] [phone]  07 Mar, 
2012   

 

 [facility]  [address] [id number] [place] [phone]     

 

 [facility]  [address] [id number] [place] [phone]     

 

 [facility]  [address] [id number] [place] [phone]     

 

 [facility]  [id number] [place] [phone]-
4679  20 Dec, 2010   

 

 [facility]  [id number] [place] [address] 247[address]-
1571  20 Dec, 2010   

 

 [facility]  [id number] [place] [address]-
1980  20 Oct, 2010   

 

 [facility]  [id number] [place] [address]-
6962  22 Oct, 2009   

 

 [facility]  [id number] [place] [address] 15247-
9241  18 Sep, 2009   







IMMUNIZATIONS

No Known Immunizations



SOCIAL HISTORY

Never Assessed



REASON FOR VISIT

Refill request



PLAN OF CARE





VITAL SIGNS





MEDICATIONS







 Medication  Instructions  Dosage  Frequency  Start Date  End Date  Duration  
Status

 

 Lisinopril 20 mg  Orally 2 times a day  1 tablet twice daily  12h           
Active

 

 Amlodipine Besylate 5 mg  Orally Once a day at hs  1 tablet       
   30  Active







RESULTS

No Results



PROCEDURES

No Known procedures



INSTRUCTIONS





MEDICATIONS ADMINISTERED

No Known Medications



MEDICAL (GENERAL) HISTORY







 Type  Description  Date

 

 Medical History  heart murmur   

 

 Medical History  hypertension   

 

 Medical History  anemia   

 

 Medical History  Thyroid disorder   

 

 Surgical History  tubal ligation  

 

 Hospitalization History  childbirth   

 

 Hospitalization History  fall rt rib contusion/possible kidney contusion

## 2019-02-17 NOTE — XMS REPORT
[facility]

 Created on: 2018



Radha Hardin

External Reference #: [id number]

: 1950

Sex: Female



Demographics







 Address  [address] [place]  [address]

 

 Preferred Language  Unknown

 

 Marital Status  Unknown

 

 Buddhism Affiliation  Unknown

 

 Race  Unknown

 

 Ethnic Group  Unknown





Author







 Author  STEVE CRESPO

 

 Organization  [facility]

 

 Address  [address] [place]  [address]



 

 Phone  [phone]







Care Team Providers







 Care Team Member Name  Role  Phone

 

 STEVE CRESPO  Unavailable  [phone]







PROBLEMS







 Type  Condition  ICD9-CM Code  NON94-FK Code  Onset Dates  Condition Status  
SNOMED Code

 

 Problem  Mixed hyperlipidemia     E78.2     Active  531824269

 

 Problem  Coronary artery disease involving native coronary artery of native 
heart without angina pectoris     I25.10     Active  4403338330840

 

 Problem  Essential hypertension     I10     Active  11521042

 

 Problem  Hyponatremia     E87.1     Active  97979519

 

 Problem  Flexural eczema     L20.82     Active  38393619







ALLERGIES

No Information



ENCOUNTERS







 Encounter  Location  Date  Diagnosis

 

 [facility]  [address] [address]-
[address]13  14 May, 2018   

 

 [facility]  [address] [address]-
9[address]  08 May, 2018  Essential hypertension I10

 

 [place] [address] [address]-
6233     

 

 [place] [address] [address]-
1235    Essential hypertension I10 ; Coronary artery disease 
involving native coronary artery of native heart without angina pectoris I25.10 
; Blurry vision H53.8 ; Mixed hyperlipidemia E78.2 ; Systolic murmur R01.1 and 
Encounter for immunization Z23

 

 [facility]  [address] [address]-
7673     

 

 [facility]  [address] [address]-
6416    Essential hypertension I10

 

 [place] [address] [address]-
9308     

 

 [facility]  [address]00565100[place] [address]-
79[address]  06 Oct, 2017  Essential hypertension I10 ; Screening for diabetes 
mellitus Z13.1 and Screening for lipid disorders Z13.220

 

 [facility]  [address]00565100[place] [address]-
[address]8  05 Oct, 2017   

 

 [facility]  [address] [place] [id number] [place] [address]-
9135  04 Oct, 2017  Essential hypertension I10

 

 [place] [address] [place] [id number] [place] [address]-
3264  21 Sep, 2017  Essential hypertension I10 ; Coronary artery disease 
involving native coronary artery of native heart without angina pectoris I25.10 
; Screening for diabetes mellitus Z13.1 and Screening for lipid disorders 
Z13.220

 

 [facility]  [address] [id number]    Essential hypertension I10

 

 [facility]  [address] [id number]  19 May, 
2017   

 

 [facility]  [address] [id number]    Essential hypertension I10

 

 [facility]  [address] [id number]  22 Mar, 
2017  Essential hypertension I10

 

 [facility]  [address] [id number]    Essential hypertension I10

 

 [facility]  [address] [id number]    Essential hypertension I10

 

 [facility]  [address] [id number]    Essential hypertension I10 ; Cardiac murmur, previously undiagnosed R01.1 
and Encounter for immunization Z23

 

 [facility]  [address] [id number]  16 May, 
2016  Essential hypertension I10

 

 [facility]  [address] [id number]  13 2016  Essential hypertension I10 and Hyponatremia E87.1

 

 [facility]  [address]00565100[place] [address]-
[address]     

 

 [facility]  [id number][place] [address]  22 Mar, 
2016  Essential hypertension I10 and Flexural eczema L20.82

 

 [facility]  [id number][place] [address]    Essential hypertension I10 and Flexural eczema L20.82

 

 [facility]  [id number][place] [address]    Essential hypertension I10

 

 [facility]  [address] [place] [id number]    Essential hypertension I10

 

 [address] [place] [id number]  29 Oct, 
2015  Encounter for immunization Z23 ; Occlusion and stenosis of unspecified 
carotid artery I65.29 and Essential (primary) hypertension I10

 

 [address]65100[place] [address]  22 Oct, 
2015  Sprain of other ligament of left ankle, initial encounter S93.492A and 
Essential hypertension I10

 

 [facility]  [id number][place] [address]  
18 Sep, 2015   

 

 [facility]  [address]00565100[place] [address]-
77[address]     

 

 [facility]  [address] [place] [address]-
6806     

 

 [facility]  [address] [place] [id number] [place] [address]-
6696     

 

 [facility]  [address] [place] [address]-
5906     

 

 [facility]  [id number], [place] [address]     

 

 [facility]  [address] [place] [address]
2546     

 

 [facility] [place]  [address] [id number] [place] [address]     

 

 [facility]  [address] [id number][place] [address]-
25[address]     

 

 [facility]  [address] [id number] [place] [address]     

 

 [facility]  [address] [facility] [id number][place] [address]-
5366     

 

 [facility]  [address] [id number], [place] [address]     

 

 [facility]  [address] [facility] [id number], [place] [address]-
1465     

 

 [facility]  [address] [id number], [place] [id number]  26 Dec, 
2013   

 

 [facility]  [address] [facility] [id number][place] [address]-
1836  26 Dec, 2013   

 

 [facility]  [address] [facility] [id number][place] [address]-
1256     

 

 [facility]  [address] [id number] [place] [address]     

 

 [facility]  [address] [id number], [place] [id number]  24 Sep, 
2013   

 

 [facility]  [address] [id number][place] [address]  21 Sep, 
2013   

 

 [facility]  [address] [facility] [id number], [place] [address]-
3916  20 Sep, 2013   

 

 [facility]  [address] [id number][place] [address]  26 Aug, 
2013   

 

 [facility]  [address] [id number] [place] [address]  25 Mar, 
2013   

 

 [facility]  [address] [id number], [place] [address]  12 Dec, 
2012   

 

 [facility]  [address] [facility] [id number][place] [address]-
[address]76  12 Dec, 2012   

 

 [facility]  [address] [id number] [place] [id number]     

 

 [facility]  [address] [facility] [id number][place] [address]-
[address]     

 

 [facility]  [address] [id number] [place] [address]  25 Sep, 
2012   

 

 [facility]  [address] [id number] [place] [address]  20 Aug, 
2012   

 

 [facility] [place]  [address] [id number] [place] [id number]  06 Aug, 
2012   

 

 [facility] [place]  [address] [id number] [place] [address]     

 

 [facility] [place]  [address] [id number] [place] [id number]     

 

 [facility] [place]  [address] [id number] [place] [address]     

 

 [facility]  [address] [id number] [place] [address]  18 May, 
2012   

 

 [facility] [place]  [address] [id number] [place] [address]  09 Mar, 
2012   

 

 [facility] [place]  [address] [id number] [place] [address]  07 Mar, 
2012   

 

 [facility] [place]  [address] [id number] [place] [address]     

 

 [facility]  [address] [id number] [place] [address]     

 

 [facility]  [address] [id number] [place] [address]     

 

 [facility]  [address] [facility] [id number][place] [address]-
4746  20 Dec, 2010   

 

 [facility]  [address] [facility] [id number][place] [address]-
3536  20 Dec, 2010   

 

 [facility]  [address] [place] [id number] [address]-
[address]  20 Oct, 2010   

 

 [facility]  [address]00565100[place] [address]-
[address]40  22 Oct, 2009   

 

 [facility]  [address] [facility] [id number] [place] [address]-
0758  18 Sep, 2009   







IMMUNIZATIONS

No Known Immunizations



SOCIAL HISTORY

Never Assessed



REASON FOR VISIT

Lab (walk-in)



PLAN OF CARE





VITAL SIGNS





MEDICATIONS

Unknown Medications



RESULTS

No Results



PROCEDURES







 Procedure  Date Ordered  Result  Body Site

 

 COMPREHEN METABOLIC PANEL  May 08, 2018      

 

 ASSAY OF URINE CREATININE  May 08, 2018      

 

 MICROALBUMIN, QUANTITATIVE  May 08, 2018      







INSTRUCTIONS





MEDICATIONS ADMINISTERED

No Known Medications



MEDICAL (GENERAL) HISTORY







 Type  Description  Date

 

 Medical History  heart murmur   

 

 Medical History  hypertension   

 

 Medical History  anemia   

 

 Medical History  Thyroid disorder   

 

 Surgical History  tubal ligation  1985

 

 Hospitalization History  childbirth   

 

 Hospitalization History  fall rt rib contusion/possible kidney contusion

## 2019-02-17 NOTE — XMS REPORT
[facility]

 Created on: 2018



Radha Hardin

External Reference #: [id number]

: 1950

Sex: Female



Demographics







 Address  [address] [place]  [address]

 

 Preferred Language  Unknown

 

 Marital Status  Unknown

 

 Presybeterian Affiliation  Unknown

 

 Race  Unknown

 

 Ethnic Group  Unknown





Author







 Author  STEVE CRESPO

 

 Organization  [facility]

 

 Address  [address] [place]  [address]



 

 Phone  [phone]







Care Team Providers







 Care Team Member Name  Role  Phone

 

 STEVE CRESPO  Unavailable  [phone]







PROBLEMS







 Type  Condition  ICD9-CM Code  GJV61-KW Code  Onset Dates  Condition Status  
SNOMED Code

 

 Problem  Mixed hyperlipidemia     E78.2     Active  347524694

 

 Problem  Coronary artery disease involving native coronary artery of native 
heart without angina pectoris     I25.10     Active  1084356639604

 

 Problem  Essential hypertension     I10     Active  52535372

 

 Problem  Hyponatremia     E87.1     Active  41042031

 

 Problem  Flexural eczema     L20.82     Active  36418205







ALLERGIES

No Information



ENCOUNTERS







 Encounter  Location  Date  Diagnosis

 

 [facility]  [address] [address]-
[address]  14 May, 2018   

 

 [facility]  [address] [address]-
[address]  08 May, 2018  Essential hypertension I10

 

 [place] [address] [address]-
3192     

 

 [place] [address] [address]-
7513    Essential hypertension I10 ; Coronary artery disease 
involving native coronary artery of native heart without angina pectoris I25.10 
; Blurry vision H53.8 ; Mixed hyperlipidemia E78.2 ; Systolic murmur R01.1 and 
Encounter for immunization Z23

 

 [facility]  [address] [address]-
1781     

 

 [facility]  [address] [address]-
5859    Essential hypertension I10

 

 [place] [address] [address]-
3569     

 

 [facility]  [address]00565100[place], [place] [address]-
[address]174  06 Oct, 2017  Essential hypertension I10 ; Screening for diabetes 
mellitus Z13.1 and Screening for lipid disorders Z13.220

 

 [facility]  [address]00565100[place], [place] [address]-
0641  05 Oct, 2017   

 

 [facility]  [address] [place] [id number] [place] [address]-
1001  04 Oct, 2017  Essential hypertension I10

 

 [place] [address] [place] [id number] [place] [address]-
7955  21 Sep, 2017  Essential hypertension I10 ; Coronary artery disease 
involving native coronary artery of native heart without angina pectoris I25.10 
; Screening for diabetes mellitus Z13.1 and Screening for lipid disorders 
Z13.220

 

 [facility]  [address] [id number]    Essential hypertension I10

 

 [facility]  [address] [id number]  19 May, 
2017   

 

 [facility]  [address] [id number]    Essential hypertension I10

 

 [facility]  [address] [id number]  22 Mar, 
2017  Essential hypertension I10

 

 [facility]  [address] [id number]    Essential hypertension I10

 

 [facility]  [address] [id number]    Essential hypertension I10

 

 [facility]  [address] [id number]    Essential hypertension I10 ; Cardiac murmur, previously undiagnosed R01.1 
and Encounter for immunization Z23

 

 [facility]  [address] [id number]  16 May, 
2016  Essential hypertension I10

 

 [facility]  [address] [id number]  13 2016  Essential hypertension I10 and Hyponatremia E87.1

 

 [facility]  [address]00565100[place], [place] [address]-
3456     

 

 [facility]  [id number] [place] [address]  22 Mar, 
2016  Essential hypertension I10 and Flexural eczema L20.82

 

 [facility]  [id number] [place], [place] [address]    Essential hypertension I10 and Flexural eczema L20.82

 

 [facility]  [id number] [place] [address]    Essential hypertension I10

 

 [facility]  [address] [place] [id number]    Essential hypertension I10

 

 [address] [place] [id number]  29 Oct, 
2015  Encounter for immunization Z23 ; Occlusion and stenosis of unspecified 
carotid artery I65.29 and Essential (primary) hypertension I10

 

 [address]00565100[place], [place] [address]  22 Oct, 
2015  Sprain of other ligament of left ankle, initial encounter S93.492A and 
Essential hypertension I10

 

 [facility]  [id number][place] [address]  
18 Sep, 2015   

 

 [facility]  [address]00565100[place], [place] [address]-
[address]16     

 

 [facility]  [address] [place] [address]-
1976     

 

 [facility]  [address] [place] [id number] [place] [address]-
6296     

 

 [facility]  [address] [place] [address]-
6876     

 

 [facility]  [id number] [place] [address]     

 

 [facility]  [address] [place] [address]
2546     

 

 [facility]  [address] [id number] [place] [id number]     

 

 [facility]  [address] [id number], [place] [address]-
2546     

 

 [facility]  [address] [id number] [place] [address]     

 

 [facility]  [address] [facility] [id number], [place] [address]-
6696     

 

 [facility]  [address] [id number] [place] [address]     

 

 [facility]  [address] [facility] [id number] [address]-
9974     

 

 [facility]  [address] [id number] [place]de[place] [address]  26 Dec, 
2013   

 

 [facility]  [address] [facility] [id number][place] [address]-
8636  26 Dec, 2013   

 

 [facility]  [address] [facility] [id number], [place] [address]-
2723     

 

 [facility] [place]  [address] [id number] [place] [address]     

 

 [facility]  [address] [id number] [place] [address]  24 Sep, 
2013   

 

 [facility]  [address] [id number] [place] [address]  21 Sep, 
2013   

 

 [facility]  [address] [facility] [id number], [place] [address]-
9506  20 Sep, 2013   

 

 [facility]  [address] [id number] [place], [place] [address]  26 Aug, 
2013   

 

 [facility]  [address] [id number] [place] [id number]  25 Mar, 
2013   

 

 [facility]  [address] [id number] [place]de, [place] [id number]  12 Dec, 
2012   

 

 [facility]  [address] [facility] [id number], [place] [address]-
3136  12 Dec, 2012   

 

 [facility]  [address] [id number] [place] [address]     

 

 [facility]  [address] [facility] [id number][place] [address]-
3369     

 

 [facility]  [address] [id number] [place] [address]  25 Sep, 
2012   

 

 [facility]  [address] [id number] [place] [id number]  20 Aug, 
2012   

 

 [facility] [place]  [address] [id number] [place] [id number]  06 Aug, 
2012   

 

 [facility] [place]  [address] [id number] [place] [address]     

 

 [facility] [place]  [address] [id number] [place] [address]     

 

 [facility] [place]  [address] [id number] [place] [address]     

 

 [facility]  [address] [id number] [place] [address]  18 May, 
2012   

 

 [facility]  [address] [id number] [place] [address]  09 Mar, 
2012   

 

 [facility]  [address] [id number] [place] [address]  07 Mar, 
2012   

 

 [facility]  [address] [id number] [place] [address]     

 

 [facility]  [address] [id number] [place] [address]     

 

 [facility]  [address] [id number] [place] [address]     

 

 [facility]  [address] [facility] [id number], [place] [address]-
7864  20 Dec, 2010   

 

 [facility]  [address] [facility] [id number], [place] [address]-
7766  20 Dec, 2010   

 

 [facility]  [address] [place] [id number], [place] [address]-
3518  20 Oct, 2010   

 

 [facility]  [address]00565100[place], [place] [address]-
6900  22 Oct, 2009   

 

 [facility]  [address] [facility] [id number] [place] [address]-
9573  18 Sep, 2009   







IMMUNIZATIONS

No Known Immunizations



SOCIAL HISTORY

Never Assessed



REASON FOR VISIT

BP med refill 



PLAN OF CARE





VITAL SIGNS





MEDICATIONS







 Medication  Instructions  Dosage  Frequency  Start Date  End Date  Duration  
Status

 

 Lisinopril 20 mg  Orally 2 times a day  1 tablet twice daily  12h           
Active







RESULTS

No Results



PROCEDURES

No Known procedures



INSTRUCTIONS





MEDICATIONS ADMINISTERED

No Known Medications



MEDICAL (GENERAL) HISTORY







 Type  Description  Date

 

 Medical History  heart murmur   

 

 Medical History  hypertension   

 

 Medical History  anemia   

 

 Medical History  Thyroid disorder   

 

 Surgical History  tubal ligation  1985

 

 Hospitalization History  childbirth   

 

 Hospitalization History  fall rt rib contusion/possible kidney contusion

## 2019-02-17 NOTE — XMS REPORT
[facility]

 Created on: 2018



Radha Hardin

External Reference #: [id number]

: 1950

Sex: Female



Demographics







 Address  [address] [place]  [address]

 

 Preferred Language  Unknown

 

 Marital Status  Unknown

 

 Sikhism Affiliation  Unknown

 

 Race  Unknown

 

 Ethnic Group  Unknown





Author







 Author  STEVE CRESPO

 

 Organization  [facility]

 

 Address  [address] [place]  [address]



 

 Phone  [phone]







Care Team Providers







 Care Team Member Name  Role  Phone

 

 STEVE CRESPO  Unavailable  [phone]







PROBLEMS







 Type  Condition  ICD9-CM Code  AYS36-CV Code  Onset Dates  Condition Status  
SNOMED Code

 

 Problem  Mixed hyperlipidemia     E78.2     Active  275547113

 

 Problem  Coronary artery disease involving native coronary artery of native 
heart without angina pectoris     I25.10     Active  [id number]

 

 Problem  Essential hypertension     I10     Active  83362967

 

 Problem  Hyponatremia     E87.1     Active  79924150

 

 Problem  Flexural eczema     L20.82     Active  30501053







ALLERGIES

No Information



ENCOUNTERS







 Encounter  Location  Date  Diagnosis

 

 [facility]  [address] [place] [phone]-
[phone]     

 

 [facility]  [id number] [place] [phone]-
2602    Essential hypertension I10 ; Coronary artery disease 
involving native coronary artery of native heart without angina pectoris I25.10 
; Blurry vision H53.8 ; Mixed hyperlipidemia E78.2 ; Systolic murmur R01.1 and 
Encounter for immunization Z23

 

 [place] [address] [place] [phone]-
4034     

 

 [facility]  [address] [place] [address] [phone]-
[phone]    Essential hypertension I10

 

 [place] [id number] [place] [address] [phone]-
2981     

 

 [place] [id number] [place] [address] [phone]-
7891  06 Oct, 2017  Essential hypertension I10 ; Screening for diabetes 
mellitus Z13.1 and Screening for lipid disorders Z13.220

 

 [place] [address] [id number] [address]
4077  05 Oct, 2017   

 

 [facility]  [address] [place] [address] [phone]-
[phone]  04 Oct, 2017  Essential hypertension I10

 

 [facility]  [address] [address]
3594  21 Sep, 2017  Essential hypertension I10 ; Coronary artery disease 
involving native coronary artery of native heart without angina pectoris I25.10 
; Screening for diabetes mellitus Z13.1 and Screening for lipid disorders 
Z13.220

 

 [facility]  [id number] [place] [address] [phone]    Essential hypertension I10

 

 [facility]  [address] [id number]  19 May, 
2017   

 

 [facility]  [address] [phone]    Essential hypertension I10

 

 [facility]  [address] [id number]  22 Mar, 
2017  Essential hypertension I10

 

 [facility]  [id number] [place] [address] [phone]    Essential hypertension I10

 

 [facility]  [address] [phone]    Essential hypertension I10

 

 [facility]  [id number] [place] [address] [phone]    Essential hypertension I10 ; Cardiac murmur, previously undiagnosed R01.1 
and Encounter for immunization Z23

 

 [facility]  [id number] [place] [address] [phone]  16 May, 
2016  Essential hypertension I10

 

 [facility]  [address] [id number]  13 2016  Essential hypertension I10 and Hyponatremia E87.1

 

 [facility]  [address] [address]-
8327     

 

 [facility]  [id number] [place] [address] [id number]  22 Mar, 
2016  Essential hypertension I10 and Flexural eczema L20.82

 

 [facility]  [id number] [place] [address] [phone]    Essential hypertension I10 and Flexural eczema L20.82

 

 [facility]  [id number] [place] [address] [phone]    Essential hypertension I10

 

 [facility]  [id number] [place] [address] [phone]    Essential hypertension I10

 

 [place] [id number] [place] [address] [phone]  29 Oct, 
2015  Encounter for immunization Z23 ; Occlusion and stenosis of unspecified 
carotid artery I65.29 and Essential (primary) hypertension I10

 

 [place] [id number] [place] [address] [phone]  22 Oct, 
2015  Sprain of other ligament of left ankle, initial encounter S93.492A and 
Essential hypertension I10

 

 [facility]  [id number] [place] [address] [phone]  
18 Sep, 2015   

 

 [facility]  [id number] [place] [address] [phone]
[phone]     

 

 [facility]  [address] [phone]
[phone]     

 

 [facility]  [address] [phone]
[phone]     

 

 [facility]  [id number] [place] [address]-
[phone]     

 

 [facility]  [id number] [place] [address] [phone]     

 

 [facility]  [id number] [place] [address] [phone]
[phone]     

 

 [place] [id number] [place] [address] [phone]     

 

 [facility]  [id number] [place] [address] [phone]-
[phone]     

 

 [id number] [place] [address] [phone]     

 

 [facility] [place] [facility]  [address] [facility] [id number] [address]-
92[address]     

 

 [facility]  [address] [id number] [place] [phone]     

 

 [facility] [place] [facility]  [address] [facility] [address] [id number] [address]-
8072     

 

 [facility]  [address] [id number] [place] [phone]  26 Dec, 
2013   

 

 [facility]  [address] [facility] [address] [id number] [place] [address]-
9001  26 Dec, 2013   

 

 [facility] [place] [facility]  [address] [facility] [address] [id number] [place] [address]-
7258     

 

 [facility]  [address] [id number] [id number]     

 

 [facility]  [address] [id number][place] [id number]  24 Sep, 
2013   

 

 [facility]  [address] [id number][place] [phone]  21 Sep, 
2013   

 

 [facility] [place] [facility]  [address] [facility] [address] [id number] [address]-
3808  20 Sep, 2013   

 

 [facility]  [address] [id number][place] [phone]  26 Aug, 
2013   

 

 [facility]  [address] [id number][place] [id number]  25 Mar, 
2013   

 

 [facility]  [address] [id number] [phone]  12 Dec, 
2012   

 

 [facility] [place] [facility]  [address] [id number][place] [address]-
8853  12 Dec, 2012   

 

 [facility]  [address] [id number][place] [phone]     

 

 [facility] [place] [facility]  [address] [facility] [address] [id number] [address]-
3006     

 

 [facility]  [address] [id number][place] [phone]  25 Sep, 
2012   

 

 [facility] [place]  [address] [id number] [place] [phone]  20 Aug, 
2012   

 

 [facility] [place]  [address] [id number] [place] [phone]  06 Aug, 
2012   

 

 [facility]  [address] [id number] [place] [id number]     

 

 [facility]  [address] [id number] [place] [phone]     

 

 [facility]  [address] [id number] [place] [id number]     

 

 [facility]  [address] [id number] [place] [phone]  18 May, 
2012   

 

 [facility]  [address] [id number] [place] [id number]  09 Mar, 
2012   

 

 [facility]  [address] [id number] [place] [id number]  07 Mar, 
2012   

 

 [facility]  [address] [id number] [place] [id number]     

 

 [facility]  [address] [id number] [place] [id number]     

 

 [facility]  [address] [id number] [place] [id number]     

 

 [facility]  [id number] [place] [address]-
7847  20 Dec, 2010   

 

 [facility]  [id number] [place] [address]-
1688  20 Dec, 2010   

 

 [facility]  [id number] [place] [address] 24031-
8131  20 Oct, 2010   

 

 [facility]  [id number] [place] [address] 60984-
[phone]  22 Oct, 2009   

 

 [facility]  [id number] [place] [address] [phone]-
2886  18 Sep, 2009   







IMMUNIZATIONS

No Known Immunizations



SOCIAL HISTORY

Never Assessed



REASON FOR VISIT

Refill request



PLAN OF CARE





VITAL SIGNS





MEDICATIONS

Unknown Medications



RESULTS

No Results



PROCEDURES

No Known procedures



INSTRUCTIONS





MEDICATIONS ADMINISTERED

No Known Medications



MEDICAL (GENERAL) HISTORY







 Type  Description  Date

 

 Medical History  heart murmur   

 

 Medical History  hypertension   

 

 Medical History  anemia   

 

 Medical History  Thyroid disorder   

 

 Surgical History  tubal ligation  

 

 Hospitalization History  childbirth   

 

 Hospitalization History  fall rt rib contusion/possible kidney contusion

## 2019-02-17 NOTE — XMS REPORT
[facility]

 Created on: 2015



Radha Hardin

External Reference #: [id number]

: 1950

Sex: Female



Demographics







 Address  [address] [place]  [address]

 

 Home Phone  [phone]

 

 Preferred Language  Unknown

 

 Marital Status  Unknown

 

 Orthodoxy Affiliation  Unknown

 

 Race  White

 

 Ethnic Group  Not  or 





Author







 Author  VADIM AVILA

 

 Organization  [employer]

 

 Address  Unknown

 

 Phone  Unavailable







Care Team Providers







 Care Team Member Name  Role  Phone

 

 VADIM AVILA  CP  Unavailable



                                                                



Allergies

          No Known Allergies                                                   
                                     



Problems

          





 Problem Type  Condition  ICD-9 Code  Onset Dates  Condition Status

 

 Problem  Lumbago  724.2     Active

 

 Problem  Unspecified myalgia and myositis  729.1     Active

 

 Problem  Place of occurrence, home  E849.0     Active

 

 Problem  Occlusion and stenosis of carotid artery without mention of cerebral 
infarction  433.10     Active

 

 Problem  Chest pain, unspecified  786.50     Active

 

 Problem  Need for prophylactic vaccination and inoculation, Influenza  V04.81 
    Active

 

 Problem  Sprain and strain of unspecified site of back  847.9     Active

 

 Problem  Undiagnosed cardiac murmurs  785.2     Active

 

 Problem  Acute bronchitis  466.0     Active

 

 Problem  Foot and toe(s), blister, without mention of infection  917.2     
Active

 

 Problem  Insomnia, unspecified  780.52     Active

 

 Problem  Pain in joint, shoulder region  719.41     Active



                                                                               
                                                                               
                                        



Medications

          No Known Medications                                                 
                             



Results

          No Known Results                                                     
               



Summary Purpose

          [employer] Submission

## 2019-02-17 NOTE — XMS REPORT
[facility]

 Created on: 2017



Radha Hardin

External Reference #: [id number]

: 1950

Sex: Female



Demographics







 Address  [address] [place]  [address]

 

 Preferred Language  Unknown

 

 Marital Status  Unknown

 

 Hoahaoism Affiliation  Unknown

 

 Race  Unknown

 

 Ethnic Group  Unknown





Author







 Author  VADIM AVILA

 

 [facility]

 

 Address  [address] [place]  [address]



 

 Phone  [phone]







Care Team Providers







 Care Team Member Name  Role  Phone

 

 VADIM AVILA  Unavailable  [phone]







PROBLEMS







 Type  Condition  ICD9-CM Code  XVZ76-FW Code  Onset Dates  Condition Status  
SNOMED Code

 

 Problem  Coronary artery disease involving native coronary artery of native 
heart without angina pectoris     I25.10     Active  [id number]

 

 Problem  Hyponatremia     E87.1     Active  66848800

 

 Problem  Occlusion and stenosis of carotid artery without mention of cerebral 
infarction  433.10        Active  [id number]

 

 Problem  Lumbago  724.2        Active  952472428

 

 Problem  Flexural eczema     L20.82     Active  44542438

 

 Problem  Essential hypertension     I10     Active  45402221







ALLERGIES

No Information



SOCIAL HISTORY

Never Assessed



PLAN OF CARE





VITAL SIGNS





MEDICATIONS







 Medication  Instructions  Dosage  Frequency  Start Date  End Date  Duration  
Status

 

 Amlodipine Besylate 5 MG  Orally Once a day at hs  1 tablet       
   30  Active







RESULTS

No Results



PROCEDURES

No Known procedures



IMMUNIZATIONS

No Known Immunizations



MEDICAL (GENERAL) HISTORY







 Type  Description  Date

 

 Medical History  heart murmur   

 

 Medical History  hypertension   

 

 Medical History  anemia   

 

 Medical History  Thyroid disorder   

 

 Surgical History  tubal ligation  1985

 

 Hospitalization History  childbirth   

 

 Hospitalization History  fall rt rib contusion/possible kidney contusion

## 2019-02-17 NOTE — XMS REPORT
[facility]

 Created on: 2018



Radha Hardin

External Reference #: [id number]

: 1950

Sex: Female



Demographics







 Address  [address] [place]  [address]

 

 Preferred Language  Unknown

 

 Marital Status  Unknown

 

 [employer] Affiliation  Unknown

 

 Race  Unknown

 

 Ethnic Group  Unknown





Author







 Author  STEVE CRESPO

 

 Organization  [facility]

 

 Address  [address] [place]  [address]



 

 Phone  [phone]







Care Team Providers







 Care Team Member Name  Role  Phone

 

 STEVE CRESPO  Unavailable  [phone]







PROBLEMS







 Type  Condition  ICD9-CM Code  MQA80-CH Code  Onset Dates  Condition Status  
SNOMED Code

 

 Problem  Mixed hyperlipidemia     E78.2     Active  192131577

 

 Problem  Coronary artery disease involving native coronary artery of native 
heart without angina pectoris     I25.10     Active  [id number]

 

 Problem  Essential hypertension     I10     Active  49390473

 

 Problem  Hyponatremia     E87.1     Active  91399897

 

 Problem  Flexural eczema     L20.82     Active  40770761







ALLERGIES







 Substance  Reaction  Event Type  Date  Status

 

 Penicillin V Potassium  swelling  Drug Allergy  13 Sep, 2018  Active







ENCOUNTERS







 Encounter  Location  Date  Diagnosis

 

 [place] [address] [address]-
3670  13 Sep, 2018  Coronary artery disease involving native coronary artery of 
native heart without angina pectoris I25.10 ; Essential hypertension I10 and 
Mixed hyperlipidemia E78.2

 

 [place] [address] [place] [id number] [place] [address]-
9095  14 May, 2018   

 

 [place] [address] [place] [id number] [place] [address]-
0750  08 May, 2018  Essential hypertension I10

 

 [place] [address] [place] [id number] [place] [address]-
6245     

 

 [place] [address] [address]-
1200    Essential hypertension I10 ; Coronary artery disease 
involving native coronary artery of native heart without angina pectoris I25.10 
; Blurry vision H53.8 ; Mixed hyperlipidemia E78.2 ; Systolic murmur R01.1 and 
Encounter for immunization Z23

 

 [place] [address] [address]-
1361     

 

 [facility]  [address]00565100[place], [place] [address]-
0682    Essential hypertension I10

 

 [facility]  [address] [place] [address]-
7702     

 

 [facility]  [address] [place] [id number] [place] [address]61-
3793  06 Oct, 2017  Essential hypertension I10 ; Screening for diabetes 
mellitus Z13.1 and Screening for lipid disorders Z13.220

 

 [facility]  [id number] [place] [address]-
9506  05 Oct, 2017   

 

 [facility]  [address] [place] [id number] [place] [address]74-
5057  04 Oct, 2017  Essential hypertension I10

 

 [facility]  [address] [place] [id number] [place] [address]46-
0706  21 Sep, 2017  Essential hypertension I10 ; Coronary artery disease 
involving native coronary artery of native heart without angina pectoris I25.10 
; Screening for diabetes mellitus Z13.1 and Screening for lipid disorders 
Z13.220

 

 [facility]  [id number] [place] [address] [id number]    Essential hypertension I10

 

 [facility]  [id number] [place] [address] [id number]  19 May, 
2017   

 

 [facility]  [address] [place] [address] [id number]    Essential hypertension I10

 

 [facility]  [id number] [place] [address] [id number]  22 Mar, 
2017  Essential hypertension I10

 

 [facility]  [id number] [place] [address] [id number]    Essential hypertension I10

 

 [facility]  [address] [id number]    Essential hypertension I10

 

 [facility]  [id number] [place] [address] [phone]    Essential hypertension I10 ; Cardiac murmur, previously undiagnosed R01.1 
and Encounter for immunization Z23

 

 [facility]  [id number] [place] [address] [id number]  16 May, 
2016  Essential hypertension I10

 

 [place] [id number] [place] [address] [phone]    Essential hypertension I10 and Hyponatremia E87.1

 

 [facility]  [address] [place] [address] [id number] [place] [address] 24076-
7294     

 

 [facility]  [address] [place] [address] [id number]  22 Mar, 
2016  Essential hypertension I10 and Flexural eczema L20.82

 

 [place] [address] [place] [address] [phone]    Essential hypertension I10 and Flexural eczema L20.82

 

 [place] [address] [place] [address] [phone]    Essential hypertension I10

 

 [place] [address] [place] [address] [phone]    Essential hypertension I10

 

 [place] [address] [place] [address] [phone]  29 Oct, 
2015  Encounter for immunization Z23 ; Occlusion and stenosis of unspecified 
carotid artery I65.29 and Essential (primary) hypertension I10

 

 [address] [place] [address] [id number]  22 Oct, 
2015  Sprain of other ligament of left ankle, initial encounter S93.492A and 
Essential hypertension I10

 

 [facility]  [address] [id number] [phone]  
18 Sep, 2015   

 

 [facility]  [address] [place] [id number] [place] [address] 49788-
6645     

 

 [facility]  [address] [place] [id number] [place] [address] 03010-
0432     

 

 [facility]  [address] [place] [id number] [place] [address] 71130-
9226     

 

 [facility]  [address] [place] [id number] [place] [address] 75476-
2546     

 

 [facility]  [address] [id number] [place] [phone]     

 

 [facility]  [address] [facility] [id number] [address]-
2546     

 

 [facility]  [address] [id number] [place] [phone]     

 

 [facility]  [address] [facility] [id number][place] [address]-
2546     

 

 [facility]  [address] [id number] [place] [phone]     

 

 [facility]  [address] [facility] [id number][place] [address]-
2546     

 

 [facility]  [address] [id number] [place] [phone]     

 

 [facility]  [address]65100[place] [address]-
3179     

 

 [facility]  [address] [id number] [id number]  26 Dec, 
2013   

 

 [facility]  [address]00565100[place] [address]-
2196  26 Dec, 2013   

 

 [facility]  [address] [facility] [id number] [address]-
3023     

 

 [facility]  [address] [id number][place] [id number]     

 

 [facility]  [address] [id number] [id number]  24 Sep, 
2013   

 

 [facility]  [address] [id number] [place] [phone]  21 Sep, 
2013   

 

 [facility]  [address] [facility] [id number][place] [address]-
2546  20 Sep, 2013   

 

 [facility]  [address] [id number] [place] [phone]  26 Aug, 
2013   

 

 [facility]  [address] [id number] [place] [id number]  25 Mar, 
2013   

 

 [facility]  [address] [id number] [place] [phone]  12 Dec, 
2012   

 

 [facility]  [address]00565100[place], [place] [address]-
3489  12 Dec, 2012   

 

 [facility]  [address] [id number] [place] [id number]     

 

 [facility]  [address] [place] [address] 24974-
2842     

 

 [facility]  [address] [id number] [place] [id number]  25 Sep, 
2012   

 

 [facility]  [address] [id number] [place] [phone]  20 Aug, 
2012   

 

 [facility]  [address] [id number] [place] [phone]  06 Aug, 
2012   

 

 [facility]  [address] [id number] [place] [phone]     

 

 [facility]  [address] [id number] [place] [id number]     

 

 [facility]  [address] [id number] [place] [id number]     

 

 [facility]  [address] [id number] [place] [phone]  18 May, 
2012   

 

 [facility]  [address] [id number] [place] [phone]  09 Mar, 
2012   

 

 [facility]  [address] [id number] [place] [phone]  07 Mar, 
2012   

 

 [facility]  [address] [id number] [place] [id number]     

 

 [facility]  [address] [id number] [place] [phone]     

 

 [facility]  [address] [id number] [place] [phone]     

 

 [facility]  [address]00565100[place] [address]-
5192  20 Dec, 2010   

 

 [facility]  [address]00565100[place], [place] [address]-
3836  20 Dec, 2010   

 

 [facility]  [address] [facility] [id number] [place] [address]-
7681  20 Oct, 2010   

 

 [facility]  [address] [facility] [id number][place] [address]-
1754  22 Oct, 2009   

 

 [facility]  [address] [facility] [id number] [place] [address]-
1236  18 Sep, 2009   







IMMUNIZATIONS

No Known Immunizations



SOCIAL HISTORY

Never Assessed



REASON FOR VISIT

Hypertension fu -- con pope



PLAN OF CARE







 Activity  Details









  









 Follow Up  3 Months with Edwin chin HTN Reason:







VITAL SIGNS







 Height  63 in  2018

 

 Weight  147.0 lbs  2018

 

 Temperature  98.7 degrees Fahrenheit  2018

 

 Heart Rate  70 bpm  2018

 

 Respiratory Rate  20   2018

 

 BMI  26.04 kg/m2  2018

 

 Blood pressure systolic  132 mmHg  2018

 

 Blood pressure diastolic  86 mmHg  2018







MEDICATIONS







 Medication  Instructions  Dosage  Frequency  Start Date  End Date  Duration  
Status

 

 Zyrtec Allergy 10 MG  Orally Once a day  1 tablet  24h           Active

 

 Lisinopril 20 mg  Orally 2 times a day  1 tablet  12h        30 days  Active

 

 Amlodipine Besylate 5MG  Orally Once a day at hs  1 tablet              Active

 

 Aspir-Low 81 MG  Orally Once a day  1 tablet  24h           Not-Taking

 

 Clonidine HCl 0.1 MG  Orally Once a day as needed  1 tablet prn  b/p > 160/100
             Active







RESULTS

No Results



PROCEDURES

No Known procedures



INSTRUCTIONS





MEDICATIONS ADMINISTERED

No Known Medications



MEDICAL (GENERAL) HISTORY







 Type  Description  Date

 

 Medical History  heart murmur   

 

 Medical History  hypertension   

 

 Medical History  anemia   

 

 Medical History  Thyroid disorder   

 

 Surgical History  tubal ligation  1985

 

 Hospitalization History  childbirth   

 

 Hospitalization History  fall rt rib contusion/possible kidney contusion

## 2019-02-17 NOTE — XMS REPORT
[facility]

 Created on: 2016



Radha Hardin

External Reference #: [id number]

: 1950

Sex: Female



Demographics







 Address  [address] [place]  [address]

 

 Home Phone  [phone]

 

 Preferred Language  Unknown

 

 Marital Status  Unknown

 

 Latter-day Affiliation  Unknown

 

 Race  White

 

 Ethnic Group  Not  or 





Author







 Author  VADIM AVILA

 

 Organization  [employer]

 

 Address  Unknown

 

 Phone  Unavailable







Care Team Providers







 Care Team Member Name  Role  Phone

 

 VADIM AVILA  CP  Unavailable



                                                                



Allergies

          No Known Allergies                                                   
                                     



Problems

          





 Problem Type  Condition  Code  Onset Dates  Condition Status

 

 Problem  Place of occurrence, home  E849.0     Active

 

 Problem  Foot and toe(s), blister, without mention of infection  917.2     
Active

 

 Problem  Unspecified myalgia and myositis  729.1     Active

 

 Problem  Undiagnosed cardiac murmurs  785.2     Active

 

 Problem  Need for prophylactic vaccination and inoculation, Influenza  V04.81 
    Active

 

 Problem  Essential hypertension  I10     Active

 

 Problem  Pain in joint, shoulder region  719.41     Active

 

 Problem  Acute bronchitis  466.0     Active

 

 Problem  Sprain and strain of unspecified site of back  847.9     Active

 

 Problem  Insomnia, unspecified  780.52     Active

 

 Assessment  Essential hypertension  I10     Active

 

 Problem  Occlusion and stenosis of carotid artery without mention of cerebral 
infarction  433.10     Active

 

 Problem  Chest pain, unspecified  786.50     Active

 

 Problem  Lumbago  724.2     Active



                                                                               
                                                                               
                                                            



Medications

          No Known Medications                                                 
                                       



Procedures

          





 Procedure  Coding System  Code  Date

 

 VENIPUNCT, ROUTINE*  CPT-4  90802  2016

 

 LAB NOT BILLED BY [facility]  CPT-4  NOBLL  2016



                                                                              



Results

          





 Name  Result  Date  Reference Range  Unit  Abnormality Flag

 

 ROUTINE VENIPUNCTURE               



                                                                    



Summary Purpose

          [employer] Submission

## 2019-02-17 NOTE — XMS REPORT
[facility]

 Created on: 2018



Radha Hardin

External Reference #: [id number]

: 1950

Sex: Female



Demographics







 Address  [address] [place]  [address]

 

 Preferred Language  Unknown

 

 Marital Status  Unknown

 

 Adventism Affiliation  Unknown

 

 Race  Unknown

 

 Ethnic Group  Unknown





Author







 Author  STEVE CRESPO

 

 Organization  [facility]

 

 Address  [address] [place]  [address]



 

 Phone  [phone]







Care Team Providers







 Care Team Member Name  Role  Phone

 

 STEVE CRESPO  Unavailable  [phone]







PROBLEMS







 Type  Condition  ICD9-CM Code  LEB71-PM Code  Onset Dates  Condition Status  
SNOMED Code

 

 Problem  Mixed hyperlipidemia     E78.2     Active  057844673

 

 Problem  Coronary artery disease involving native coronary artery of native 
heart without angina pectoris     I25.10     Active  [id number]

 

 Problem  Essential hypertension     I10     Active  43307259

 

 Problem  Hyponatremia     E87.1     Active  34745673

 

 Problem  Flexural eczema     L20.82     Active  22516046







ALLERGIES

No Information



ENCOUNTERS







 Encounter  Location  Date  Diagnosis

 

 [facility]  [address] [address]-
1825  14 May, 2018   

 

 [facility]  [address] [address]-
9653  08 May, 2018  Essential hypertension I10

 

 [place] [address] [address]-
1250     

 

 [place] [address] [address]-
0164    Essential hypertension I10 ; Coronary artery disease 
involving native coronary artery of native heart without angina pectoris I25.10 
; Blurry vision H53.8 ; Mixed hyperlipidemia E78.2 ; Systolic murmur R01.1 and 
Encounter for immunization Z23

 

 [facility]  [address] [address]-
8666     

 

 [facility]  [address] [address]-
4533    Essential hypertension I10

 

 [place] [address] [address]-
0462     

 

 [facility]  [address]00565100[place] [address]-
1151  06 Oct, 2017  Essential hypertension I10 ; Screening for diabetes 
mellitus Z13.1 and Screening for lipid disorders Z13.220

 

 [facility]  [address]00565100[place] [address]-
5401  05 Oct, 2017   

 

 [facility]  [address] [place] [id number] [place] [address] 9[address]56-
8463  04 Oct, 2017  Essential hypertension I10

 

 [place] [address] [place] [id number] [place] [address] 04656-
5327  21 Sep, 2017  Essential hypertension I10 ; Coronary artery disease 
involving native coronary artery of native heart without angina pectoris I25.10 
; Screening for diabetes mellitus Z13.1 and Screening for lipid disorders 
Z13.220

 

 [facility]  [address] [id number]    Essential hypertension I10

 

 [facility]  [address] [id number]  19 May, 
2017   

 

 [facility]  [address] [id number]    Essential hypertension I10

 

 [facility]  [address] [id number]  22 Mar, 
2017  Essential hypertension I10

 

 [facility]  [address] [id number]    Essential hypertension I10

 

 [facility]  [address] [id number]    Essential hypertension I10

 

 [facility]  [address] [id number]    Essential hypertension I10 ; Cardiac murmur, previously undiagnosed R01.1 
and Encounter for immunization Z23

 

 [facility]  [address] [id number]  16 May, 
2016  Essential hypertension I10

 

 [facility]  [address] [id number]  13 2016  Essential hypertension I10 and Hyponatremia E87.1

 

 [facility]  [address]00565100[place] [address]-
0496     

 

 [facility]  [id number][place] [address]  22 Mar, 
2016  Essential hypertension I10 and Flexural eczema L20.82

 

 [facility]  [id number], [place] [address]    Essential hypertension I10 and Flexural eczema L20.82

 

 [facility]  [id number][place] [address]    Essential hypertension I10

 

 [facility]  [address] [place] [address] [id number]    Essential hypertension I10

 

 [address] [place] [address] [id number]  29 Oct, 
2015  Encounter for immunization Z23 ; Occlusion and stenosis of unspecified 
carotid artery I65.29 and Essential (primary) hypertension I10

 

 [address]65100[place], [place] [address]  22 Oct, 
2015  Sprain of other ligament of left ankle, initial encounter S93.492A and 
Essential hypertension I10

 

 [facility]  [id number][place] [address]  
18 Sep, 2015   

 

 [facility]  [address]00565100[place] [address]-
3105     

 

 [facility]  [address] [place] [address]478-
0836     

 

 [facility]  [address] [place] [id number] [place] [address] 54[address]-
6486     

 

 [facility]  [address] [place] [address] 8[address]13-
4206     

 

 [facility]  [id number], [place] [address]     

 

 [facility]  [address] [place] [address] 82527
2546     

 

 [facility]  [address] [id number] [place] [phone]     

 

 [facility]  [address] [id number] [place] [address]-
2546     

 

 [facility]  [address] [id number] [place] [address]     

 

 [facility]  [address] [facility] [id number] [place] [address]-
6926     

 

 [facility]  [address] [id number][place] [phone]     

 

 [facility]  [address] [facility] [id number] [place] [address]-
4709     

 

 [facility]  [address] [id number][place] [id number]  26 Dec, 
2013   

 

 [facility]  [address] [facility] [id number] [place] [address]-
7136  26 Dec, 2013   

 

 [facility]  [address] [facility] [id number] [place] [address]-
0095     

 

 [facility]  [address] [id number] [place] [address]     

 

 [facility]  [address] [id number], [place] [id number]  24 Sep, 
2013   

 

 [facility]  [address] [id number], [place] [address]  21 Sep, 
2013   

 

 [facility]  [address] [facility] [id number] [place] [address]-
4776  20 Sep, 2013   

 

 [facility]  [address] [id number], [place] [address]  26 Aug, 
2013   

 

 [facility]  [address] [id number] [place] [id number]  25 Mar, 
2013   

 

 [facility]  [address] [id number], [place] [address]  12 Dec, 
2012   

 

 [facility]  [address] [facility] [id number] [place] [address]-
0766  12 Dec, 2012   

 

 [facility]  [address] [id number] [place] [id number]     

 

 [facility]  [address] [facility] [id number] [place] [address]-
4234     

 

 [facility]  [address] [id number] [place] [id number]  25 Sep, 
2012   

 

 [facility]  [address] [id number] [place] [id number]  20 Aug, 
2012   

 

 [facility]  [address] [id number] [place] [id number]  06 Aug, 
2012   

 

 [facility]  [address] [id number] [place] [address]     

 

 [facility]  [address] [id number] [place] [address]     

 

 [facility]  [address] [id number] [place] [address]     

 

 [facility]  [address] [id number] [place] [address]  18 May, 
2012   

 

 [facility]  [address] [id number] [place] [address]  09 Mar, 
2012   

 

 [facility]  [address] [id number] [place] [address]  07 Mar, 
2012   

 

 [facility]  [address] [id number] [place] 0006706[address]     

 

 [facility]  [address] [id number] [place] [id number]     

 

 [facility]  [address] [id number] [place] [address]     

 

 [facility]  [address] [facility] [id number] [place] [address]-
9435  20 Dec, 2010   

 

 [facility]  [address] [facility] [id number] [place] [address]-
4006  20 Dec, 2010   

 

 [facility]  [address] [id number] [address]-
9856  20 Oct, 2010   

 

 [facility]  [address]00565100[place] [address]-
2218  22 Oct, 2009   

 

 [facility]  [address] [facility] [id number] [place] [address]-
7962  18 Sep, 2009   







IMMUNIZATIONS

No Known Immunizations



SOCIAL HISTORY

Never Assessed



REASON FOR VISIT

Requests return call



PLAN OF CARE





VITAL SIGNS





MEDICATIONS

Unknown Medications



RESULTS

No Results



PROCEDURES

No Known procedures



INSTRUCTIONS





MEDICATIONS ADMINISTERED

No Known Medications



MEDICAL (GENERAL) HISTORY







 Type  Description  Date

 

 Medical History  heart murmur   

 

 Medical History  hypertension   

 

 Medical History  anemia   

 

 Medical History  Thyroid disorder   

 

 Surgical History  tubal ligation  

 

 Hospitalization History  childbirth   

 

 Hospitalization History  fall rt rib contusion/possible kidney contusion

## 2019-02-17 NOTE — XMS REPORT
[facility]

 Created on: 2018



Radha Hardin

External Reference #: [id number]

: 1950

Sex: Female



Demographics







 Address  [address] [place]  [address]

 

 Preferred Language  Unknown

 

 Marital Status  Unknown

 

 Shinto Affiliation  Unknown

 

 Race  Unknown

 

 Ethnic Group  Unknown





Author







 Author  STEVE CRESPO

 

 Organization  [facility]

 

 Address  [address] [place]  [address]



 

 Phone  [phone]







Care Team Providers







 Care Team Member Name  Role  Phone

 

 STEVE CRESPO  Unavailable  [phone]







PROBLEMS







 Type  Condition  ICD9-CM Code  SVP92-HE Code  Onset Dates  Condition Status  
SNOMED Code

 

 Problem  Mixed hyperlipidemia     E78.2     Active  184929547

 

 Problem  Coronary artery disease involving native coronary artery of native 
heart without angina pectoris     I25.10     Active  [id number]

 

 Problem  Essential hypertension     I10     Active  96247858

 

 Problem  Hyponatremia     E87.1     Active  39639413

 

 Problem  Flexural eczema     L20.82     Active  90702965







ALLERGIES

No Information



ENCOUNTERS







 Encounter  Location  Date  Diagnosis

 

 [facility]  [address] [place] [address]-
[phone]     

 

 [facility]  [id number] [place] [phone]-
9094    Essential hypertension I10 ; Coronary artery disease 
involving native coronary artery of native heart without angina pectoris I25.10 
; Blurry vision H53.8 ; Mixed hyperlipidemia E78.2 ; Systolic murmur R01.1 and 
Encounter for immunization Z23

 

 [place] [address] [place] [phone]-
[phone]     

 

 [facility]  [address] [place] [phone]-
7417    Essential hypertension I10

 

 [place] [id number] [place] [address] 12164-
2016     

 

 [place] [id number] [place] [address] 24696-
4757  06 Oct, 2017  Essential hypertension I10 ; Screening for diabetes 
mellitus Z13.1 and Screening for lipid disorders Z13.220

 

 [place] [id number], [place] [address]-
[address]78  05 Oct, 2017   

 

 [facility]  [address] [place] [address] 1965[address]-
5625  04 Oct, 2017  Essential hypertension I10

 

 [facility]  [address], [place] [address]-
5445  21 Sep, 2017  Essential hypertension I10 ; Coronary artery disease 
involving native coronary artery of native heart without angina pectoris I25.10 
; Screening for diabetes mellitus Z13.1 and Screening for lipid disorders 
Z13.220

 

 [facility]  [id number] [place] [address] [phone]    Essential hypertension I10

 

 [facility]  [address] [id number]  19 May, 
2017   

 

 [facility]  [address] [id number]    Essential hypertension I10

 

 [facility]  [address] [id number]  22 Mar, 
2017  Essential hypertension I10

 

 [facility]  [id number] [place] [address] [phone]    Essential hypertension I10

 

 [facility]  [address] [phone]    Essential hypertension I10

 

 [facility]  [id number] [place] [address] [phone]    Essential hypertension I10 ; Cardiac murmur, previously undiagnosed R01.1 
and Encounter for immunization Z23

 

 [facility]  [id number] [place] [address] [phone]  16 May, 
2016  Essential hypertension I10

 

 [facility]  [address] [id number]  13 2016  Essential hypertension I10 and Hyponatremia E87.1

 

 [facility]  [address] [address]-
4[address]     

 

 [facility]  [id number] [place] [address] [id number]  22 Mar, 
2016  Essential hypertension I10 and Flexural eczema L20.82

 

 [facility]  [id number] [place] [address] [phone]    Essential hypertension I10 and Flexural eczema L20.82

 

 [facility]  [id number] [place] [address] [phone]    Essential hypertension I10

 

 [facility]  [id number] [place] [address] [phone]    Essential hypertension I10

 

 [place] [address] [place] [address] [phone]  29 Oct, 
2015  Encounter for immunization Z23 ; Occlusion and stenosis of unspecified 
carotid artery I65.29 and Essential (primary) hypertension I10

 

 [place] [address] [place] [address] [phone]  22 Oct, 
2015  Sprain of other ligament of left ankle, initial encounter S93.492A and 
Essential hypertension I10

 

 [facility]  [id number] [place] [address] [phone]  
18 Sep, 2015   

 

 [facility]  [id number] [place] [address] [phone]
[phone]     

 

 [facility]  [address] [phone]
[phone]     

 

 [facility]  [address] [phone]
[phone]     

 

 [facility]  [id number] [place] [address] 89765-
[phone]     

 

 [facility]  [id number] [place] [address] [phone]     

 

 [facility]  [id number] [place] [address] [phone]
[phone]     

 

 [place] [address] [place] [address] [phone]     

 

 [facility]  [id number] [place] [address] [phone]-
[phone]     

 

 [place] [id number] [place] [address] [phone]     

 

 [facility] [place] [facility]  [address] [facility] [id number] [address]-
[address]56     

 

 [facility]  [address] [id number] [place] [phone]     

 

 [facility] [place] [facility]  [address] [facility] [id number] [address]-
8945     

 

 [facility]  [address] [id number] [place] [phone]  26 Dec, 
2013   

 

 [facility]  [address] [facility] [id number] [place] [address]-
8630  26 Dec, 2013   

 

 [facility] [place] [facility]  [address] [facility] [id number] [place] [address]-
6537     

 

 [facility]  [address] [id number] [phone]     

 

 [facility]  [address] [id number][place] [phone]  24 Sep, 
2013   

 

 [facility]  [address] [id number][place] [phone]  21 Sep, 
2013   

 

 [facility] [place] [facility]  [address] [facility] [id number] [address]-
3177  20 Sep, 2013   

 

 [facility]  [address] [id number][place] [id number]  26 Aug, 
2013   

 

 [facility]  [address] [id number][place] [phone]  25 Mar, 
2013   

 

 [facility]  [address] [id number][place] [phone]  12 Dec, 
2012   

 

 [facility] [place] [facility]  [address] [id number][place] [address]-
4567  12 Dec, 2012   

 

 [facility]  [address] [id number][place] [phone]     

 

 [facility] [place] [facility]  [address] [facility] [id number], [place] [address]-
5087     

 

 [facility]  [address] [id number][place] [phone]  25 Sep, 
2012   

 

 [facility]  [address] [id number] [place] [phone]  20 Aug, 
2012   

 

 [facility]  [address] [id number] [place] [phone]  06 Aug, 
2012   

 

 [facility]  [address] [id number] [place] [phone]     

 

 [facility]  [address] [id number] [place] [phone]     

 

 [facility]  [address] [id number] [place] [phone]     

 

 [facility]  [address] [id number] [place] [phone]  18 May, 
2012   

 

 [facility]  [address] [id number] [place] [phone]  09 Mar, 
2012   

 

 [facility]  [address] [id number] [place] [id number]  07 Mar, 
2012   

 

 [facility]  [address] [id number] [place] [phone]     

 

 [facility]  [address] [id number] [place] [phone]     

 

 [facility]  [address] [id number] [place] [phone]     

 

 [facility]  [id number] [place] [address] [phone]-
[phone]  20 Dec, 2010   

 

 [facility]  [id number] [place] [address] 10605-
5206  20 Dec, 2010   

 

 [facility]  [id number] [place] [address] 08424-
7212  20 Oct, 2010   

 

 [facility]  [id number] [place] [address] 55248-
6600  22 Oct, 2009   

 

 [facility]  [id number] [place] [address] [phone]
[phone]  18 Sep, 2009   







IMMUNIZATIONS

No Known Immunizations



SOCIAL HISTORY

Never Assessed



REASON FOR VISIT

Lab (walk-in)



PLAN OF CARE





VITAL SIGNS





MEDICATIONS

Unknown Medications



RESULTS







 Name  Result  Date  Reference Range

 

 A1C     2017-10-06   

 

 Hemoglobin A1c  5.1     4.8-5.6

 

 CBC     2017-10-06   

 

 WBC  5.3     3.4-10.8

 

 RBC  3.95     3.77-5.28

 

 Hemoglobin  13.0     11.1-15.9

 

 Hematocrit  38.5     34.0-46.6

 

 MCV  98     79-97

 

 MCH  32.9     26.6-33.0

 

 MCHC  33.8     31.5-35.7

 

 RDW  12.7     12.3-15.4

 

 Platelets  232     150-379

 

 Neutrophils  46     Not Estab.

 

 Lymphs  39     Not Estab.

 

 Monocytes  8     Not Estab.

 

 Eos  6     Not Estab.

 

 Basos  1     Not Estab.

 

 Neutrophils (Absolute)  2.4     1.4-7.0

 

 Lymphs (Absolute)  2.1     0.7-3.1

 

 Monocytes(Absolute)  0.4     0.1-0.9

 

 Eos (Absolute)  0.3     0.0-0.4

 

 Baso (Absolute)  0.1     0.0-0.2

 

 Immature Granulocytes  0     Not Estab.

 

 Immature Grans (Abs)  0.0     0.0-0.1

 

 MICROALBUMIN/CREATININE RATIO, URINE     2017-10-06   

 

 Creatinine, Urine  65.6     Not Estab.

 

 Microalbumin, Urine  5.5     Not Estab.

 

 Microalb/Creat Ratio  8.4     0.0-30.0

 

 LIPID PANEL     2017-10-06   

 

 Cholesterol, Total  171     100-199

 

 Triglycerides  155     0-149

 

 HDL Cholesterol  63     >39

 

 VLDL Cholesterol Jhon  31     5-40

 

 LDL Cholesterol Calc  77     0-99

 

 CMP     2017-10-06   

 

 Glucose, Serum  89     65-99

 

 BUN  8     8-27

 

 Creatinine, Serum  0.72     0.57-1.00

 

 eGFR If NonAfricn Am  86         >59

 

 eGFR If Africn Am  99         >59

 

 BUN/Creatinine Ratio  11     12-28

 

 Sodium, Serum  130     134-144

 

 Potassium, Serum  4.8     3.5-5.2

 

 Chloride, Serum  90     

 

 Carbon Dioxide, Total  25     18-29

 

 Calcium, Serum  9.2     8.7-10.3

 

 Protein, Total, Serum  7.4     6.0-8.5

 

 Albumin, Serum  4.5     3.6-4.8

 

 Globulin, Total  2.9     1.5-4.5

 

 A/G Ratio  1.6     1.2-2.2

 

 Bilirubin, Total  0.6     0.0-1.2

 

 Alkaline Phosphatase, S  108     

 

 AST (SGOT)  26     0-40

 

 ALT (SGPT)  14     0-32







PROCEDURES







 Procedure  Date Ordered  Result  Body Site

 

 Hemoglobin Test Send Out 0 dollar  Oct 06, 2017      

 

 [facility]  Oct 06, 2017      

 

 VENIPUNCT, ROUTINE*  Oct 06, 2017      







INSTRUCTIONS





MEDICATIONS ADMINISTERED

No Known Medications



MEDICAL (GENERAL) HISTORY







 Type  Description  Date

 

 Medical History  heart murmur   

 

 Medical History  hypertension   

 

 Medical History  anemia   

 

 Medical History  Thyroid disorder   

 

 Surgical History  tubal ligation  1985

 

 Hospitalization History  childbirth   

 

 Hospitalization History  fall rt rib contusion/possible kidney contusion

## 2019-02-17 NOTE — XMS REPORT
[facility]

 Created on: 10/02/2017



Radha Hardin

External Reference #: [id number]

: 1950

Sex: Female



Demographics







 Address  [address] [place]  [address]

 

 Preferred Language  Unknown

 

 Marital Status  Unknown

 

 Uatsdin Affiliation  Unknown

 

 Race  Unknown

 

 Ethnic Group  Unknown





Author







 Author  VADIM AVILA

 

 [facility]

 

 Address  [address] [place]  [address]



 

 Phone  [phone]







Care Team Providers







 Care Team Member Name  Role  Phone

 

 VADIM AVILA  Unavailable  [phone]







PROBLEMS







 Type  Condition  ICD9-CM Code  WOZ25-FR Code  Onset Dates  Condition Status  
SNOMED Code

 

 Problem  Coronary artery disease involving native coronary artery of native 
heart without angina pectoris     I25.10     Active  [id number]

 

 Problem  Hyponatremia     E87.1     Active  72616489

 

 Problem  Occlusion and stenosis of carotid artery without mention of cerebral 
infarction  433.10        Active  [id number]

 

 Problem  Lumbago  724.2        Active  541266617

 

 Problem  Flexural eczema     L20.82     Active  64928204

 

 Problem  Essential hypertension     I10     Active  86606521







ALLERGIES







 Substance  Reaction  Event Type  Date  Status

 

 Penicillin V Potassium  swelling  Drug Allergy    Active







SOCIAL HISTORY

Never Assessed



PLAN OF CARE







 Activity  Details









  









 Follow Up  4 Weeks Reason:htn







VITAL SIGNS







 Height  63 in  2017

 

 Weight  146.8 lbs  2017

 

 Temperature  96.9 degrees Fahrenheit  2017

 

 Heart Rate  70 bpm  2017

 

 Respiratory Rate  20   2017

 

 BMI  26.00 kg/m2  2017

 

 Blood pressure systolic  150 mmHg  2017

 

 Blood pressure diastolic  88 mmHg  2017







MEDICATIONS







 Medication  Instructions  Dosage  Frequency  Start Date  End Date  Duration  
Status

 

 Aspir-Low 81 MG  Orally Once a day  1 tablet  24h           Active

 

 Amlodipine Besylate 2.5 MG  Orally Once a day at hs  1 tablet     
        Active

 

 Lisinopril 20 mg  Orally 2 times a day  1 tablet in am  1.5 tab hs  12h       
    Active

 

 Clonidine HCl 0.1 MG  Orally Once a day as needed  1 tablet prn  b/p > 160/100
             Active







RESULTS

No Results



PROCEDURES







 Procedure  Date Ordered  Result  Body Site

 

 [facility] VISIT ESTABLISHED PATIENT  2017      







IMMUNIZATIONS

No Known Immunizations



MEDICAL (GENERAL) HISTORY







 Type  Description  Date

 

 Medical History  heart murmur   

 

 Medical History  hypertension   

 

 Medical History  anemia   

 

 Medical History  Thyroid disorder   

 

 Surgical History  tubal ligation  1985

 

 Hospitalization History  childbirth   

 

 Hospitalization History  fall rt rib contusion/possible kidney contusion

## 2019-02-17 NOTE — XMS REPORT
[facility]

 Created on: 11/10/2015



Radha Hardin

External Reference #: [id number]

: 1950

Sex: Female



Demographics







 Address  [address] [place]  [address]

 

 Home Phone  [phone]

 

 Preferred Language  Unknown

 

 Marital Status  Unknown

 

 Islam Affiliation  Unknown

 

 Race  White

 

 Ethnic Group  Not  or 





Author







 Author  VADIM AVILA

 

 Organization  [employer]

 

 Address  Unknown

 

 Phone  Unavailable







Care Team Providers







 Care Team Member Name  Role  Phone

 

 VADIM AVILA  CP  Unavailable



                                                                



Allergies, Adverse Reactions, Alerts

          





 Substance  Reaction  Event Type

 

 Penicillin V Potassium  swelling  Drug Allergy



                                                                               
         



Problems

          





 Problem Type  Condition  Code  Onset Dates  Condition Status

 

 Problem  Place of occurrence, home  E849.0     Active

 

 Problem  Foot and toe(s), blister, without mention of infection  917.2     
Active

 

 Problem  Unspecified myalgia and myositis  729.1     Active

 

 Problem  Undiagnosed cardiac murmurs  785.2     Active

 

 Problem  Need for prophylactic vaccination and inoculation, Influenza  V04.81 
    Active

 

 Problem  Essential hypertension  I10     Active

 

 Problem  Pain in joint, shoulder region  719.41     Active

 

 Problem  Acute bronchitis  466.0     Active

 

 Problem  Sprain and strain of unspecified site of back  847.9     Active

 

 Problem  Insomnia, unspecified  780.52     Active

 

 Assessment  Sprain of other ligament of left ankle, initial encounter  
S93.492A     Active

 

 Problem  Occlusion and stenosis of carotid artery without mention of cerebral 
infarction  433.10     Active

 

 Problem  Chest pain, unspecified  786.50     Active

 

 Assessment  Essential hypertension  I10     Active

 

 Problem  Lumbago  724.2     Active



                                                                               
                                                                               
                                                                      



Medications

          





 Medication  Code System  Code  Instructions  Start Date  End Date  Status  
Dosage

 

 Lisinopril  NDC  07206154634  20MG Orally Once a day           1 tablet



                                                                               
         



Procedures

          





 Procedure  Coding System  Code  Date

 

 Office Visit, Est Pt., Level 3  CPT-4  46550  Oct 22, 2015

 

 [facility] VISIT ESTABLISHED PATIENT  CPT-4    Oct 22, 2015



                                                                               
                             



Vital Signs

          





 Date/Time:  Oct 22, 2015

 

 Temperature  97.4 F

 

 Weight  140 lbs

 

 Height  63 in

 

 BMI  24.80 Index

 

 Blood Pressure Diastolic  90 mmHg

 

 Blood Pressure Systolic  140 mmHg

 

 Cardiac Monitoring Heart Rate  74 bpm



                                                                              



Results

          No Known Results                                                     
               



Summary Purpose

          [employer] Submission

## 2019-02-17 NOTE — XMS REPORT
[facility]

 Created on: 2018



Radha Hardin

External Reference #: [id number]

: 1950

Sex: Female



Demographics







 Address  [address] [place]  [address]

 

 Preferred Language  Unknown

 

 Marital Status  Unknown

 

 Restorationist Affiliation  Unknown

 

 Race  Unknown

 

 Ethnic Group  Unknown





Author







 Author  STEVE CRESPO

 

 Organization  [facility]

 

 Address  [address] [place]  [address]



 

 Phone  [phone]







Care Team Providers







 Care Team Member Name  Role  Phone

 

 STEVE CRESPO  Unavailable  [phone]







PROBLEMS







 Type  Condition  ICD9-CM Code  VKU04-JK Code  Onset Dates  Condition Status  
SNOMED Code

 

 Problem  Mixed hyperlipidemia     E78.2     Active  490771253

 

 Problem  Coronary artery disease involving native coronary artery of native 
heart without angina pectoris     I25.10     Active  7098134623169

 

 Problem  Essential hypertension     I10     Active  03495520

 

 Problem  Hyponatremia     E87.1     Active  94232807

 

 Problem  Flexural eczema     L20.82     Active  14063820







ALLERGIES







 Substance  Reaction  Event Type  Date  Status

 

 Penicillin V Potassium  swelling  Drug Allergy    Active







ENCOUNTERS







 Encounter  Location  Date  Diagnosis

 

 [place] [address] [address]-
[address]  14 May, 2018   

 

 [facility]  [address] [address]-
[address]  08 May, 2018  Essential hypertension I10

 

 [place] [address] [address]-
66[address]     

 

 [place] [address] [address]-
3827    Essential hypertension I10 ; Coronary artery disease 
involving native coronary artery of native heart without angina pectoris I25.10 
; Blurry vision H53.8 ; Mixed hyperlipidemia E78.2 ; Systolic murmur R01.1 and 
Encounter for immunization Z23

 

 [facility]  [address] [address]-
2021     

 

 [place] [address] [address]-
4241    Essential hypertension I10

 

 [place] [address] [address] [place] [address]
8866     

 

 [facility]  [address] [place] [id number] [place] [address]-
9287  06 Oct, 2017  Essential hypertension I10 ; Screening for diabetes 
mellitus Z13.1 and Screening for lipid disorders Z13.220

 

 [facility]  [address] [place] [id number] [place] [address]-
3303  05 Oct, 2017   

 

 [facility]  [id number] [place] [address]-
4901  04 Oct, 2017  Essential hypertension I10

 

 [facility]  [id number] [place] [address]-
3326  21 Sep, 2017  Essential hypertension I10 ; Coronary artery disease 
involving native coronary artery of native heart without angina pectoris I25.10 
; Screening for diabetes mellitus Z13.1 and Screening for lipid disorders 
Z13.220

 

 [facility]  [address] [id number]    Essential hypertension I10

 

 [facility]  [address] [id number]  19 May, 
2017   

 

 [facility]  [address] [id number]    Essential hypertension I10

 

 [facility]  [address] [id number]  22 Mar, 
2017  Essential hypertension I10

 

 [facility]  [address] [place] [id number] [place] [phone]    Essential hypertension I10

 

 [facility]  [address] [id number]    Essential hypertension I10

 

 [facility]  [id number] [place] [phone]    Essential hypertension I10 ; Cardiac murmur, previously undiagnosed R01.1 
and Encounter for immunization Z23

 

 [facility]  [id number] [place] [address] [id number]  16 May, 
2016  Essential hypertension I10

 

 [facility]  [address] [id number]    Essential hypertension I10 and Hyponatremia E87.1

 

 [facility]  [address]65[address], [place] [address]-
[address]     

 

 [facility]  [id number] [place] [address] [id number]  22 Mar, 
2016  Essential hypertension I10 and Flexural eczema L20.82

 

 [facility]  [address]65100[place] [address]    Essential hypertension I10 and Flexural eczema L20.82

 

 [facility]  [id number][place] [address]    Essential hypertension I10

 

 [facility]  [id number] [place] [address] [id number]    Essential hypertension I10

 

 [facility]  [id number] [place] [address] [phone]  29 Oct, 
2015  Encounter for immunization Z23 ; Occlusion and stenosis of unspecified 
carotid artery I65.29 and Essential (primary) hypertension I10

 

 [facility]  [address]00565100[place] [phone]  22 Oct, 
2015  Sprain of other ligament of left ankle, initial encounter S93.492A and 
Essential hypertension I10

 

 [facility]  [address] [id number] [id number]  
18 Sep, 2015   

 

 [facility]  [address]65100[place] [address]41-
2702     

 

 [facility]  [address] [place] [address]-
4625     

 

 [facility]  [address] [place] [address]-
0949     

 

 [facility]  [id number] [place] [address]-
0396     

 

 [facility]  [address] [place] [address] [id number]     

 

 [facility]  [address] [place] [address]-
4874     

 

 [facility] [place]  [address] [id number] [place] [phone]     

 

 [facility]  [address] [facility] [id number][place] [address]-
2546     

 

 [facility]  [address] [id number] [place] [id number]     

 

 [facility]  [address] [facility] [id number][place] [address]
2546     

 

 [facility]  [address] [id number][place] [id number]     

 

 [facility]  [address] [facility] [id number][place] [address]-
0866     

 

 [facility]  [address] [id number][place] [id number]  26 Dec, 
2013   

 

 [facility]  [address]65100[place], [place] [address]-
8836  26 Dec, 2013   

 

 [facility]  [address] [facility] [id number][place] [address]-
1027     

 

 [facility]  [address] [id number][place] [id number]     

 

 [facility]  [address] [id number][place] [address]  24 Sep, 
2013   

 

 [facility]  [address] [id number][place] [id number]  21 Sep, 
2013   

 

 [facility]  [address] [facility] [id number][place] [address]-
2546  20 Sep, 2013   

 

 [facility]  [address] [id number] [place] [id number]  26 Aug, 
2013   

 

 [facility]  [address] [id number] [place] [id number]  25 Mar, 
2013   

 

 [facility]  [address] [id number] [place] [id number]  12 Dec, 
2012   

 

 [facility]  [address] [facility] [id number][place] [address]-
1168  12 Dec, 2012   

 

 [facility]  [address] [id number] [place] [phone]     

 

 [facility]  [address] [facility] [id number][place] [address]-
9383     

 

 [facility]  [address] [id number] [place] [id number]  25 Sep, 
2012   

 

 [facility]  [address] [id number] [place] [id number]  20 Aug, 
2012   

 

 [facility] [place]  [address] [id number] [place] [id number]  06 Aug, 
2012   

 

 [facility] [place]  [address] [id number] [place] [phone]     

 

 [facility] [place]  [address] [id number] [place] [address]     

 

 [facility]  [address] [id number] [place] [address]     

 

 [facility]  [address] [id number] [place] [id number]  18 May, 
2012   

 

 [facility]  [address] [id number] [place] [address]  09 Mar, 
2012   

 

 [facility]  [address] [id number] [place] [address]  07 Mar, 
2012   

 

 [facility]  [address] [id number] [place] [phone]     

 

 [facility]  [address] [id number] [place] [address]     

 

 [facility]  [address] [id number] [place] [address]     

 

 [facility]  [address] [facility] [id number][place] [address]-
8870  20 Dec, 2010   

 

 [facility]  [address] [facility] [id number][place] [address]67-
1402  20 Dec, 2010   

 

 [facility]  [address]00565100[place] [address]-
0195  20 Oct, 2010   

 

 [facility]  [id number] [place] [address] [place] [address]-
7225  22 Oct, 2009   

 

 [facility]  [address] [facility] [id number] [place] [address]-
7179  18 Sep, 2009   







IMMUNIZATIONS







 Vaccine  Route  Administration Date  Status

 

 TDAP (BOOSTRIX)  IM Intramuscular  2018  Administered







SOCIAL HISTORY

Never Assessed



REASON FOR VISIT

HTN--[id number]



PLAN OF CARE







 Activity  Details









  









 Follow Up  3 Months with Edwin HENDRICKSON Reason:







VITAL SIGNS







 Height  63 in  2018

 

 Weight  153.6 lbs  2018

 

 Temperature  98.2 degrees Fahrenheit  2018

 

 Heart Rate  72 bpm  2018

 

 Respiratory Rate  18   2018

 

 BMI  27.21 kg/m2  2018

 

 Blood pressure systolic  130 mmHg  2018

 

 Blood pressure diastolic  84 mmHg  2018







MEDICATIONS







 Medication  Instructions  Dosage  Frequency  Start Date  End Date  Duration  
Status

 

 Aspir-Low 81 MG  Orally Once a day  1 tablet  24h           Active

 

 Amlodipine Besylate 5MG  Orally Once a day at hs  1 tablet              Active

 

 Lisinopril 20 mg  Orally 2 times a day  1 tablet  12h           Active

 

 Clonidine HCl 0.1 MG  Orally Once a day as needed  1 tablet prn  b/p > 160/100
             Active

 

 Zyrtec Allergy 10 MG  Orally Once a day  1 tablet  24h           Active







RESULTS

No Results



PROCEDURES







 Procedure  Date Ordered  Result  Body Site

 

 TDAP (BOOSTRIX)  2018      

 

 SINGLE IMMUNIZATION ADMIN  2018      







INSTRUCTIONS





MEDICATIONS ADMINISTERED

No Known Medications



MEDICAL (GENERAL) HISTORY







 Type  Description  Date

 

 Medical History  heart murmur   

 

 Medical History  hypertension   

 

 Medical History  anemia   

 

 Medical History  Thyroid disorder   

 

 Surgical History  tubal ligation  1985

 

 Hospitalization History  childbirth   

 

 Hospitalization History  fall rt rib contusion/possible kidney contusion

## 2019-02-17 NOTE — XMS REPORT
[facility]

 Created on: 2018



Radha Hardin

External Reference #: [id number]

: 1950

Sex: Female



Demographics







 Address  [address] [place]  [address]

 

 Preferred Language  Unknown

 

 Marital Status  Unknown

 

 Sabianism Affiliation  Unknown

 

 Race  Unknown

 

 Ethnic Group  Unknown





Author







 Author  STEVE CRESPO

 

 Organization  [facility]

 

 Address  [address] [place]  [address]



 

 Phone  [phone]







Care Team Providers







 Care Team Member Name  Role  Phone

 

 STEVE CRESPO  Unavailable  [phone]







PROBLEMS







 Type  Condition  ICD9-CM Code  FHZ08-ML Code  Onset Dates  Condition Status  
SNOMED Code

 

 Problem  Mixed hyperlipidemia     E78.2     Active  566719294

 

 Problem  Coronary artery disease involving native coronary artery of native 
heart without angina pectoris     I25.10     Active  1550304314874

 

 Problem  Essential hypertension     I10     Active  94450228

 

 Problem  Hyponatremia     E87.1     Active  96375484

 

 Problem  Flexural eczema     L20.82     Active  58936164







ALLERGIES







 Substance  Reaction  Event Type  Date  Status

 

 Penicillin V Potassium  swelling  Drug Allergy  21 Sep, 2017  Active







ENCOUNTERS







 Encounter  Location  Date  Diagnosis

 

 [place] [address] [address]-
8338  14 May, 2018   

 

 [facility]  [address] [address]-
1536  08 May, 2018  Essential hypertension I10

 

 [place] [address] [address]-
4192     

 

 [place] [address] [address]-
0702    Essential hypertension I10 ; Coronary artery disease 
involving native coronary artery of native heart without angina pectoris I25.10 
; Blurry vision H53.8 ; Mixed hyperlipidemia E78.2 ; Systolic murmur R01.1 and 
Encounter for immunization Z23

 

 [facility]  [address] [address]-
2638  08 2018   

 

 [place] [address] [address]-
8458    Essential hypertension I10

 

 [place] [address] [address] [place] [address]-
4535     

 

 [facility]  [address] [place] [id number] [place] [address]-
0275  06 Oct, 2017  Essential hypertension I10 ; Screening for diabetes 
mellitus Z13.1 and Screening for lipid disorders Z13.220

 

 [facility]  [id number] [place] [address]-
6888  05 Oct, 2017   

 

 [facility]  [address] [place] [address] [id number] [place] [address]-
2060  04 Oct, 2017  Essential hypertension I10

 

 [facility]  [address] [place] [address] [id number] [place] [address]-
0734  21 Sep, 2017  Essential hypertension I10 ; Coronary artery disease 
involving native coronary artery of native heart without angina pectoris I25.10 
; Screening for diabetes mellitus Z13.1 and Screening for lipid disorders 
Z13.220

 

 [facility]  [address] [id number]    Essential hypertension I10

 

 [facility]  [address] [id number]  19 May, 
2017   

 

 [facility]  [address] [id number]    Essential hypertension I10

 

 [facility]  [address] [id number]  22 Mar, 
2017  Essential hypertension I10

 

 [facility]  [address] [id number] [place] [address] [id number]    Essential hypertension I10

 

 [facility]  [address] [id number]    Essential hypertension I10

 

 [facility]  [id number] [place] [address] [id number]    Essential hypertension I10 ; Cardiac murmur, previously undiagnosed R01.1 
and Encounter for immunization Z23

 

 [facility]  [id number] [place] [address] [id number]  16 May, 
2016  Essential hypertension I10

 

 [facility]  [address] [id number]    Essential hypertension I10 and Hyponatremia E87.1

 

 [facility]  [address]00565100[place] [address]-
2886     

 

 [facility]  [id number] [place] [address] [id number]  22 Mar, 
2016  Essential hypertension I10 and Flexural eczema L20.82

 

 [facility]  [address]00565100[place] [address]    Essential hypertension I10 and Flexural eczema L20.82

 

 [facility]  [id number] [place] [address]    Essential hypertension I10

 

 [facility]  [id number] [place] [address] [id number]    Essential hypertension I10

 

 [facility]  [id number] [place] [address] [id number]  29 Oct, 
2015  Encounter for immunization Z23 ; Occlusion and stenosis of unspecified 
carotid artery I65.29 and Essential (primary) hypertension I10

 

 [facility]  [address]00565100[place] [address]  22 Oct, 
2015  Sprain of other ligament of left ankle, initial encounter S93.492A and 
Essential hypertension I10

 

 [facility]  [address] [id number][place] [id number]  
18 Sep, 2015   

 

 [facility]  [address]00565100[place] [address]2050-
3646     

 

 [facility]  [address] [place] [address]-
5002     

 

 [facility]  [address] [place] [address]-
9905     

 

 [facility]  [id number] [place] [address]-
6646     

 

 [facility]  [address] [place] [address] [id number]     

 

 [facility]  [address] [place] [address]-
5264     

 

 [facility]  [address] [id number] [place] [id number]     

 

 [facility]  [address] [facility] [id number] [address]-
2546     

 

 [facility]  [address] [id number] [place] [address]     

 

 [facility]  [address] [facility] [id number] [place] [address]
2546     

 

 [facility]  [address] [id number] [place] [id number]     

 

 [facility]  [address] [facility] [id number] [place] [address]-
6666     

 

 [facility]  [address] [id number] [place] [id number]  26 Dec, 
2013   

 

 [facility]  [address]00565100[place] [address]-
0356  26 Dec, 2013   

 

 [facility]  [address] [facility] [id number] [place] [address]-
2365     

 

 [facility]  [address] [id number] [place] [address]     

 

 [facility]  [address] [id number] [place] [address]  24 Sep, 
2013   

 

 [facility]  [address] [id number] [place] [address]7940[address]  21 Sep, 
2013   

 

 [facility]  [address] [facility] [id number] [place] [address]-
2546  20 Sep, 2013   

 

 [facility]  [address] [id number] [place] [address]  26 Aug, 
2013   

 

 [facility]  [address] [id number] [place] [id number]  25 Mar, 
2013   

 

 [facility]  [address] [id number] [place] [address]  12 Dec, 
2012   

 

 [facility]  [address] [facility] [id number] [place] [address]-
5148  12 Dec, 2012   

 

 [facility]  [address] [id number] [place] [phone]     

 

 [facility]  [address] [facility] [id number] [address]-
1813     

 

 [facility]  [address] [id number] [place] [address]  25 Sep, 
2012   

 

 [facility]  [address] [id number] [place] [id number]  20 Aug, 
2012   

 

 [facility]  [address] [id number] [place] [id number]  06 Aug, 
2012   

 

 [facility]  [address] [id number] [place] [id number]     

 

 [facility]  [address] [id number] [place] [address]     

 

 [facility]  [address] [id number] [place] [address]     

 

 [facility]  [address] [id number] [place] [id number]  18 May, 
2012   

 

 [facility]  [address] [id number] [place] [id number]  09 Mar, 
2012   

 

 [facility]  [address] [id number] [place] [address]  07 Mar, 
2012   

 

 [facility]  [address] [id number] [place] [id number]     

 

 [facility]  [address] [id number] [place] [address]     

 

 [facility]  [address] [id number] [place] [address]     

 

 [facility]  [address] [facility] [id number] [address]-
8034  20 Dec, 2010   

 

 [facility]  [address] [facility] [id number] [place] 98541693-
9253  20 Dec, 2010   

 

 [facility]  [address]00565100[place] [address]-
5586  20 Oct, 2010   

 

 [facility]  [id number] [place] [address] [place] [address]-
6655  22 Oct, 2009   

 

 [facility]  [address] [facility] [id number] [place] [address]-
8087  18 Sep, 2009   







IMMUNIZATIONS

No Known Immunizations



SOCIAL HISTORY

Never Assessed



REASON FOR VISIT

Establish Care---DBennettRMOHAN



PLAN OF CARE







 Activity  Details









  









 Follow Up  6 Months with Galiliana for HTN/CAD f.u Reason:







VITAL SIGNS







 Height  63 in  2017

 

 Weight  150 lbs  2017

 

 Temperature  97.7 degrees Fahrenheit  2017

 

 Heart Rate  70 bpm  2017

 

 Respiratory Rate  20   2017

 

 BMI  26.57 kg/m2  2017

 

 Blood pressure systolic  132 mmHg  2017

 

 Blood pressure diastolic  90 mmHg  2017







MEDICATIONS







 Medication  Instructions  Dosage  Frequency  Start Date  End Date  Duration  
Status

 

 Aspir-Low 81 MG  Orally Once a day  1 tablet  24h           Active

 

 Lisinopril 20 mg  Orally 2 times a day  1 tablet in am  1.5 tab hs  12h       
    Active

 

 Amlodipine Besylate 5 mg  Orally Once a day at hs  1 tablet       
   30  Active

 

 Zyrtec Allergy 10 MG  Orally Once a day  1 tablet  24h           Active

 

 Clonidine HCl 0.1 MG  Orally Once a day as needed  1 tablet prn  b/p > 160/100
             Active







RESULTS

No Results



PROCEDURES

No Known procedures



INSTRUCTIONS





MEDICATIONS ADMINISTERED

No Known Medications



MEDICAL (GENERAL) HISTORY







 Type  Description  Date

 

 Medical History  heart murmur   

 

 Medical History  hypertension   

 

 Medical History  anemia   

 

 Medical History  Thyroid disorder   

 

 Surgical History  tubal ligation  1985

 

 Hospitalization History  childbirth   

 

 Hospitalization History  fall rt rib contusion/possible kidney contusion

## 2019-02-17 NOTE — XMS REPORT
[facility]

 Created on: 10/26/2017



Radha Hardin

External Reference #: [id number]

: 1950

Sex: Female



Demographics







 Address  [address] [place]  [address]

 

 Preferred Language  Unknown

 

 Marital Status  Unknown

 

 Spiritism Affiliation  Unknown

 

 Race  Unknown

 

 Ethnic Group  Unknown





Author







 Author  VADIM AVILA

 

 [facility]

 

 Address  [address] [place]  [address]



 

 Phone  [phone]







Care Team Providers







 Care Team Member Name  Role  Phone

 

 AVILAVADIM  Unavailable  [phone]







PROBLEMS







 Type  Condition  ICD9-CM Code  HNN25-TE Code  Onset Dates  Condition Status  
SNOMED Code

 

 Problem  Coronary artery disease involving native coronary artery of native 
heart without angina pectoris     I25.10     Active  2340452473293

 

 Problem  Hyponatremia     E87.1     Active  23407355

 

 Problem  Occlusion and stenosis of carotid artery without mention of cerebral 
infarction  433.10        Active  [id number]

 

 Problem  Lumbago  724.2        Active  [id number]

 

 Problem  Flexural eczema     L20.82     Active  41508637

 

 Problem  Essential hypertension     I10     Active  11099917







ALLERGIES







 Substance  Reaction  Event Type  Date  Status

 

 Penicillin V Potassium  swelling  Drug Allergy    Active







SOCIAL HISTORY

Never Assessed



PLAN OF CARE







 Activity  Details









  









 Follow Up  4 Weeks Reason:bp







VITAL SIGNS







 Height  63 in  2017

 

 Weight  149.6 lbs  2017

 

 Temperature  97.2 degrees Fahrenheit  2017

 

 Heart Rate  76 bpm  2017

 

 Respiratory Rate  16   2017

 

 BMI  26.50 kg/m2  2017

 

 Blood pressure systolic  130 mmHg  2017

 

 Blood pressure diastolic  78 mmHg  2017







MEDICATIONS







 Medication  Instructions  Dosage  Frequency  Start Date  End Date  Duration  
Status

 

 Clonidine HCl 0.1 MG  Orally Once a day as needed  1 tablet prn  b/p > 160/100
             Active

 

 Aspir-Low 81 MG  Orally Once a day  1 tablet  24h           Active

 

 Zyrtec Allergy 10 MG  Orally Once a day  1 tablet  24h           Active

 

 Lisinopril 20 mg  Orally 2 times a day  1 tablet in am  1.5 tab hs  12h       
    Active

 

 Amlodipine Besylate 5 MG  Orally Once a day at hs  1 tablet       
      Active







RESULTS

No Results



PROCEDURES







 Procedure  Date Ordered  Result  Body Site

 

 [facility] VISIT ESTABLISHED PATIENT  2017      







IMMUNIZATIONS

No Known Immunizations



MEDICAL (GENERAL) HISTORY







 Type  Description  Date

 

 Medical History  heart murmur   

 

 Medical History  hypertension   

 

 Medical History  anemia   

 

 Medical History  Thyroid disorder   

 

 Surgical History  tubal ligation  1985

 

 Hospitalization History  childbirth   

 

 Hospitalization History  fall rt rib contusion/possible kidney contusion

## 2019-02-17 NOTE — XMS REPORT
[facility]

 Created on: 10/29/2015



Radha Hardin

External Reference #: [id number]

: 1950

Sex: Female



Demographics







 Address  [address] [place]  [address]

 

 Home Phone  [phone]

 

 Preferred Language  Unknown

 

 Marital Status  Unknown

 

 Anabaptist Affiliation  Unknown

 

 Race  White

 

 Ethnic Group  Not  or 





Author







 Author  VADIM AVILA

 

 Organization  [employer]

 

 Address  Unknown

 

 Phone  Unavailable







Care Team Providers







 Care Team Member Name  Role  Phone

 

 VADIM AVILA  CP  Unavailable



                                                                



Allergies

          No Known Allergies                                                   
                                     



Problems

          





 Problem Type  Condition  Code  Onset Dates  Condition Status

 

 Problem  Place of occurrence, home  E849.0     Active

 

 Problem  Foot and toe(s), blister, without mention of infection  917.2     
Active

 

 Problem  Unspecified myalgia and myositis  729.1     Active

 

 Problem  Undiagnosed cardiac murmurs  785.2     Active

 

 Problem  Need for prophylactic vaccination and inoculation, Influenza  V04.81 
    Active

 

 Problem  Essential hypertension  I10     Active

 

 Problem  Pain in joint, shoulder region  719.41     Active

 

 Problem  Acute bronchitis  466.0     Active

 

 Problem  Sprain and strain of unspecified site of back  847.9     Active

 

 Problem  Insomnia, unspecified  780.52     Active

 

 Assessment  Encounter for immunization  Z23     Active

 

 Problem  Occlusion and stenosis of carotid artery without mention of cerebral 
infarction  433.10     Active

 

 Assessment  Essential (primary) hypertension  I10     Active

 

 Problem  Chest pain, unspecified  786.50     Active

 

 Assessment  Occlusion and stenosis of unspecified carotid artery  I65.29     
Active

 

 Problem  Lumbago  724.2     Active



                                                                               
                                                                               
                                                                                



Medications

          No Known Medications                                                 
                                       



Procedures

          





 Procedure  Coding System  Code  Date

 

 [facility]  [facility]T-[facility]  NOBLL  Oct 29, 2015

 

 VENIPUNCT, ROUTINE*  CPT-4  98933  Oct 29, 2015

 

 ADMN FLU VAC NO FEE SCHED SAME DAY  CPT-4    Oct 29, 2015

 

 SINGLE IMMUNIZATION ADMIN  CPT-4  32367  Oct 29, 2015

 

 FLUARIX QUAD (3 & UP)-GSK-  CPT-4  21129  Oct 29, 2015



                                                                               
                             



Results

          





 Name  Result  Date  Reference Range  Unit  Abnormality Flag

 

 ROUTINE VENIPUNCTURE               



                                                                               
         



Immunizations

          





 Vaccine  Administration Date

 

 FLUARIX QUAD (3 & UP)-GSK-2015  Oct 29, 2015



                                                                    



Summary Purpose

          [employer] Submission

## 2019-09-07 ENCOUNTER — HOSPITAL ENCOUNTER (EMERGENCY)
Dept: HOSPITAL 75 - ER | Age: 69
Discharge: HOME | End: 2019-09-07
Payer: MEDICARE

## 2019-09-07 VITALS — BODY MASS INDEX: 25.73 KG/M2 | WEIGHT: 145.19 LBS | HEIGHT: 63 IN

## 2019-09-07 VITALS — DIASTOLIC BLOOD PRESSURE: 85 MMHG | SYSTOLIC BLOOD PRESSURE: 142 MMHG

## 2019-09-07 DIAGNOSIS — Z82.49: ICD-10-CM
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
## 2019-09-07 DIAGNOSIS — I10: ICD-10-CM

## 2019-09-07 DIAGNOSIS — Z88.0: ICD-10-CM

## 2019-09-07 DIAGNOSIS — F41.9: ICD-10-CM

## 2019-09-07 DIAGNOSIS — Z77.22: ICD-10-CM

## 2019-09-07 DIAGNOSIS — S61.452A: Primary | ICD-10-CM

## 2019-09-07 PROCEDURE — 96372 THER/PROPH/DIAG INJ SC/IM: CPT

## 2019-09-07 PROCEDURE — 90675 RABIES VACCINE IM: CPT

## 2019-09-07 PROCEDURE — 90375 RABIES IG IM/SC: CPT

## 2019-09-07 PROCEDURE — 90471 IMMUNIZATION ADMIN: CPT

## 2019-09-07 PROCEDURE — 99284 EMERGENCY DEPT VISIT MOD MDM: CPT

## 2019-09-07 PROCEDURE — 90715 TDAP VACCINE 7 YRS/> IM: CPT

## 2019-09-07 NOTE — ED INTEGUMENTARY GENERAL
General


Chief Complaint:  Bite-Animal/Human/Insect


Stated Complaint:  CAT BITE TO LEFT HAND


Nursing Triage Note:  


patient reports that a stray cat bit her L hand PTA


Source:  patient


Exam Limitations:  no limitations





History of Present Illness


Date Seen by Provider:  Sep 7, 2019


Time Seen by Provider:  21:00


Initial Comments


 to ER with reports of being bitten in the dorsal aspect of the left hand 

between the pointer finger by a stray cat. This occurred just prior to arrival. 

Her own tetanus vaccine is not up-to-date.


Timing/Duration:  just prior to arrival


Severity:  moderate


Location:  none


Associated Symptoms:  denies symptoms





Allergies and Home Medications


Allergies


Coded Allergies:  


     Penicillins (Unverified  Allergy, Severe, EDEMA, 2/20/12)





Home Medications


Amlodipine Besylate 5 Mg Tablet, 5 MG PO HS, (Reported)


Apixaban 5 Mg Tablet, 5 MG PO BID, (Reported)


Metoprolol Succinate 25 Mg Tab.er.24h, 25 MG PO DAILY, (Reported)





Patient Home Medication List


Home Medication List Reviewed:  Yes





Review of Systems


Review of Systems


Constitutional:  see HPI


EENTM:  see HPI


Respiratory:  no symptoms reported


Cardiovascular:  no symptoms reported


Genitourinary:  no symptoms reported


Musculoskeletal:  see HPI


Skin:  no symptoms reported


Psychiatric/Neurological:  No Symptoms Reported


Endocrine:  No Symptoms Reported





Past Medical-Social-Family Hx


Patient Social History


Alcohol Use:  Regular Use


Alcohol Beverage of Choice:  Beer


Recreational Drug Use:  No


Smoking Status:  Never a Smoker


Type Used:  Smokeless Tobacco


2nd Hand Smoke Exposure:  Yes


Recent Foreign Travel:  No


Contact w/Someone Who Travel:  No


Recent Infectious Disease Expo:  No


Recent Hopitalizations:  No


Physical Abuse:  No


Sexual Abuse:  No


Mistreated:  No





Immunizations Up To Date


Tetanus Booster (TDap):  More than 5yrs


Date of Pneumonia Vaccine:  Feb 20, 2012


Date of Influenza Vaccine:  Feb 20, 2012





Past Medical History


Cardiac


Heart Murmur, Hypertension, Valvular Heart Disease


Reproductive Disorders:  No


GYN History:  Menopausal


Anxiety


Adverse Reaction/Blood Tranf:  No





Family Medical History


Heart Disease, Diabetes, Other Conditions/Hx





Physical Exam


Vital Signs





Vital Signs - First Documented








 9/7/19





 21:00


 


Temp 98.2


 


Pulse 60


 


Resp 18


 


B/P (MAP) 150/96 (114)


 


Pulse Ox 94





Capillary Refill : Less Than 3 Seconds


General Appearance:  WD/WN, no apparent distress


Respiratory:  no respiratory distress, no accessory muscle use


Extremities:  normal range of motion, non-tender


Neurologic/Psychiatric:  alert, normal mood/affect, oriented x 3


Skin:  normal color, warm/dry, other (2 cm skin tear to the dorsal aspect of the

hand between the thumb and the pointer finger on the left. Rinse with 

chlorhexidine/saline solution and reapproximated held in place with Steri-

Strips.)





Progress/Results/Core Measures


Results/Orders


My Orders





Orders - PETER POTTER


Dipht,Pertuss(Acell),Tet Adult (Boostrix (9/7/19 21:45)


Rabies Immune Globulin/Pf Inj (Hyperrab (9/7/19 21:45)


Rabies Vaccine Human Dipl Cell (Rabavert (9/7/19 21:45)


Doxycycline Hyclate Tablet (Vibramycin T (9/7/19 21:45)


Metronidazole Tablet (Flagyl Tablet) (9/7/19 21:45)





Vital Signs/I&O











 9/7/19





 21:00


 


Temp 98.2


 


Pulse 60


 


Resp 18


 


B/P (MAP) 150/96 (114)


 


Pulse Ox 94














Blood Pressure Mean:                    114











Departure


Impression





   Primary Impression:  


   Cat bite


   Qualified Codes:  W55.01XA - Bitten by cat, initial encounter


Disposition:  01 HOME, SELF-CARE


Condition:  Stable





Departure-Patient Inst.


Decision time for Depature:  21:54


Referrals:  


STEVE CRESPO MD (PCP/Family)


Primary Care Physician


Patient Instructions:  Animal Bites (DC)





Add. Discharge Instructions:  


1. The big risk here is for infection. A close eye on this for any increasing 

redness swelling or pain. Follow-up with your doctor Monday for recheck. Take 

antibiotics as directed. This is very important. Return to the hospital for 

rabies vaccines on the dates prescribed. All discharge instructions reviewed 

with patient and/or family. Voiced understanding.


Scripts


Metronidazole (Metronidazole) 500 Mg Tablet


500 MG PO TID, #21 TAB 0 Refills


   Prov: PETER POTTER         9/7/19 


Doxycycline Hyclate (Doxycycline Hyclate) 100 Mg Tablet


100 MG PO BID, #20 TAB 0 Refills


   Prov: PETER POTTER         9/7/19











PETER POTTER              Sep 7, 2019 21:52

## 2019-09-15 ENCOUNTER — HOSPITAL ENCOUNTER (OUTPATIENT)
Dept: HOSPITAL 75 - SDC | Age: 69
LOS: 90 days | Discharge: HOME | End: 2019-12-14
Attending: NURSE PRACTITIONER
Payer: MEDICARE

## 2019-09-15 VITALS — SYSTOLIC BLOOD PRESSURE: 154 MMHG | DIASTOLIC BLOOD PRESSURE: 89 MMHG

## 2019-09-15 VITALS — HEIGHT: 62.99 IN | BODY MASS INDEX: 25.86 KG/M2 | WEIGHT: 145.95 LBS

## 2019-09-15 DIAGNOSIS — T14.8XXA: ICD-10-CM
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
## 2019-09-15 DIAGNOSIS — Z29.14: Primary | ICD-10-CM

## 2019-09-15 PROCEDURE — 90675 RABIES VACCINE IM: CPT

## 2019-09-15 PROCEDURE — 90471 IMMUNIZATION ADMIN: CPT

## 2019-09-15 NOTE — NUR
PATIENT WAS SUPPOSED TO BE HERE YESTERDAY (9/14/2019) BUT STATED  " SHE HAD A PARTY TO GO 
TO."  THIS RN STRESSED TO PATIENT ABOUT THE IMPORTANCE OF GETTING THESE INJECTIONS ON 
TIME/DATE.

## 2019-09-21 VITALS — DIASTOLIC BLOOD PRESSURE: 91 MMHG | SYSTOLIC BLOOD PRESSURE: 148 MMHG

## 2019-09-28 VITALS — SYSTOLIC BLOOD PRESSURE: 131 MMHG | DIASTOLIC BLOOD PRESSURE: 83 MMHG

## 2019-10-22 ENCOUNTER — HOSPITAL ENCOUNTER (EMERGENCY)
Dept: HOSPITAL 75 - ER | Age: 69
Discharge: HOME | End: 2019-10-22
Payer: MEDICARE

## 2019-10-22 VITALS — WEIGHT: 145.28 LBS | BODY MASS INDEX: 25.74 KG/M2 | HEIGHT: 62.99 IN

## 2019-10-22 VITALS — DIASTOLIC BLOOD PRESSURE: 111 MMHG | SYSTOLIC BLOOD PRESSURE: 150 MMHG

## 2019-10-22 DIAGNOSIS — Z79.4: ICD-10-CM

## 2019-10-22 DIAGNOSIS — S63.502A: Primary | ICD-10-CM

## 2019-10-22 DIAGNOSIS — Z87.891: ICD-10-CM
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
## 2019-10-22 PROCEDURE — 87205 SMEAR GRAM STAIN: CPT

## 2019-10-22 PROCEDURE — 87070 CULTURE OTHR SPECIMN AEROBIC: CPT

## 2019-10-22 PROCEDURE — 89060 EXAM SYNOVIAL FLUID CRYSTALS: CPT

## 2019-10-22 PROCEDURE — 73110 X-RAY EXAM OF WRIST: CPT

## 2019-10-22 PROCEDURE — 10060 I&D ABSCESS SIMPLE/SINGLE: CPT

## 2019-10-22 NOTE — DIAGNOSTIC IMAGING REPORT
INDICATION: Fall with left wrist pain.



TIME OF EXAM: 4:48 p.m.



Three views of the left wrist were obtained.



FINDINGS: Generalized demineralization of the hand and wrist is

noted. The distal radius and ulna appear to be intact. Carpal

bones are intact. There are degenerative changes at the first CMC

joint. Visualized metacarpals appear to be intact. No fractures

are seen.



IMPRESSION: Demineralization and degenerative change. No acute

bony abnormality is detected.



Dictated by: 



  Dictated on workstation # [id number]

## 2019-10-22 NOTE — ED UPPER EXTREMITY
General


Chief Complaint:  Upper Extremity


Stated Complaint:  FELL ON LEFT WRIST


Nursing Triage Note:  


FELL IN GARAGE HURTING LEFT WRIST TODAY AT NOON.


Nursing Sepsis Screen:  No Definite Risk


Source:  patient


Exam Limitations:  no limitations


 (BRIANNA ZELAYA)





History of Present Illness


Date Seen by Provider:  Oct 22, 2019


Time Seen by Provider:  16:00


Initial Comments


Patient presents to the ED today with complaints of left wrist pain after she 

fell while feeding her cat at 1200 today.  There is a potential bony abnormality

along the left scaphoid bone. Patient has limited ROM of the left wrist.  

Patient rates the pain a 3 out of 10 and denies any associated symptoms.


Location Injury Occurred:  At home


Onset:  this afternoon


Pain/Injury Location:  left wrist


Method of Injury:  fell


Modifying Factors:  Improves With Rest (BRIANNA ZELAYA)





Allergies and Home Medications


Allergies


Coded Allergies:  


     No Known Drug Allergies (Unverified , 9/29/16)





Home Medications


Aripiprazole 15 Mg Tablet, 15 MG PO DAILY, (Reported)


Dexlansoprazole 60 Mg Cap., 60 MG PO DAILY, (Reported)


Enalapril Maleate 10 Mg Tablet, 10 MG PO DAILY, (Reported)


Fluoxetine HCl 20 Mg Capsule, 20 MG PO DAILY, (Reported)


Furosemide 40 Mg Tablet, 40 MG PO DAILY, (Reported)


Meloxicam 15 Mg Tablet, 15 MG PO DAILY, (Reported)


Metformin HCl 500 Mg Tablet, 500 MG PO BID, (Reported)


Potassium Chloride 10 Meq Tablet.er, 10 MEQ PO DAILY, (Reported)


Tiotropium Bromide 1 Inh Aerp, 2 INH IH DAILY, (Reported)





Patient Home Medication List


Home Medication List Reviewed:  Yes


 (PETER POTTER)





Review of Systems


Constitutional:  no symptoms reported


EENTM:  no symptoms reported


Respiratory:  no symptoms reported


Cardiovascular:  no symptoms reported


Gastrointestinal:  no symptoms reported


Genitourinary:  no symptoms reported


Pregnant:  No


Musculoskeletal:  see HPI


Skin:  no symptoms reported


Psychiatric/Neurological:  No Symptoms Reported (BRIANNA ZELAYA)





Past Medical-Social-Family Hx


Patient Social History


Alcohol Use:  Occasionally Uses


Recreational Drug Use:  No


Smoking Status:  Never a Smoker


Type Used:  Cigarettes


Former Smoker, Quit:  Jan 1, 2006


Recent Foreign Travel:  No


Contact w/Someone Who Travel:  No


Recent Infectious Disease Expo:  No


Recent Hopitalizations:  No


 (ZELAYA,BRIANNA PA STUDENT)





Seasonal Allergies


Seasonal Allergies:  No


 (BRIANNA ZLEAYA STUDENT)





Past Medical History


Surgeries:  Yes


Respiratory:  Yes (USES INHALER, SOB)


Currently Using CPAP:  No


Currently Using BIPAP:  No


Cardiac:  No


Neurological:  No


Reproductive Disorders:  No


Female Reproductive Disorders:  Denies


Sexually Transmitted Disease:  No


HIV/AIDS:  No


Gastrointestinal:  No


Musculoskeletal:  Yes (ARTHRITIS IN BACK )


Endocrine:  No


Cancer:  No


Psychosocial:  No


Integumentary:  No


Blood Disorders:  No


Adverse Reaction/Blood Tranf:  No


 (BRIANNA ZELAYA STUDENT)





Physical Exam


Vital Signs





Vital Signs - First Documented








 10/22/19





 15:35


 


Temp 36.5


 


Pulse 82


 


Resp 16


 


B/P (MAP) 150/111 (124)


 


Pulse Ox 96


 


O2 Delivery Room Air








 (PETER POTTER)


Vital Signs


Capillary Refill : Less Than 3 Seconds 


 (BRIANNA ZELAYA STUDENT)


Height, Weight, BMI


Height: 0'0.00"


Weight: 0lbs. 0.0oz. 0.519724qt; 25.00 BMI


Method:


General Appearance:  no apparent distress


Cardiovascular:  normal peripheral pulses, regular rate, rhythm, no edema, no 

gallop, no JVD, no murmur


Respiratory:  chest non-tender, lungs clear, normal breath sounds, no 

respiratory distress, no accessory muscle use


Gastrointestinal:  normal bowel sounds, non tender, soft, no organomegaly, no 

pulsatile mass


Wrist:  Yes asymmetry, Yes bone tenderness, Yes limited ROM, Yes pain, Yes soft 

tissue tenderness, Yes swelling


Hand:  normal inspection, non-tender, no evidence of injury, normal ROM


Neurologic/Psychiatric:  alert, normal mood/affect, oriented x 3


Skin:  normal color, warm/dry


Lymphatic:  no adenopathy (BRIANNA ZELAYA STUDENT)





Progress/Results/Core Measures


Results/Orders


Lab Results





Laboratory Tests








Test


 10/22/19


17:05 Range/Units


 





 (PETER POTTER)


My Orders





Orders - PETER POTTER


Wrist, Left, 3 Views Or More (10/22/19 16:08)


Body Fluid Culture (10/22/19 16:56)


Crystals,Body Fluid (10/22/19 16:56)


Lidocaine 1% Inj 20 Ml (Xylocaine 1% Inj (10/22/19 17:00)


 (PETER POTTER)


Medications Given in ED





Current Medications








 Medications  Dose


 Ordered  Sig/Stanislav


 Route  Start Time


 Stop Time Status Last Admin


Dose Admin


 


 Lidocaine HCl  2.1 ml  ONCE  ONCE


 INJ  10/22/19 17:00


 10/22/19 17:01 DC 10/22/19 17:08


2.1 ML





 (PETER POTTER)


Vital Signs/I&O











 10/22/19





 15:35


 


Temp 36.5


 


Pulse 82


 


Resp 16


 


B/P (MAP) 150/111 (124)


 


Pulse Ox 96


 


O2 Delivery Room Air





 (PETER POTTER)








Blood Pressure Mean:                    124











Departure


Communication (Admissions)


I've seen the patient and agree with plan of care. There is some swelling with 

fluctuance to the dorsal radial side of the wrist. She has pain with range of 

motion to the wrist. There is no redness. The swelling was sudden in onset after

a fall today. The x-ray is unremarkable. We decided to aspirate this fluid 

collection, area was anesthetized with 0.5 mL of 1% lidocaine without 

epinephrine. An 18-gauge 1/2 inch needle was then inserted only about 1 cm into 

this area of fluctuance/fluid collection. 2.5 cc of bloody material was 

aspirated. This was sent to lab for culture


 (PETER POTTER)





Impression





   Primary Impression:  


   Hemarthrosis, left wrist


Disposition:  01 HOME, SELF-CARE


Condition:  Stable





Departure-Patient Inst.


Decision time for Depature:  17:14


 (PETER POTTER)


Referrals:  


[facility] ([place])


Primary Care Physician








SEB MORALES MD, ROBERT F DO ZAFUTA, MICHAEL P MD


Patient Instructions:  Hemarthrosis (DC)





Add. Discharge Instructions:  


1. Wear the splint at all times except when showering until told otherwise. Call

your primary care provider tomorrow to make an appointment for an MRI of the 

wrist if she feels this is necessary. Pain medication as directed in the mean

time. Alternatively, you may call the orthopedic surgeons listed and see them 

directly. All discharge instructions reviewed with patient and/or family. Voiced

understanding.


Scripts


Hydrocodone/Acetaminophen (Norco 5-325 Tablet) 1 Each Tablet


1 TAB PO Q6H for Pain MDD 10 TABS for 7 Days, #10 TAB


   Prov: PETER POTTER         10/22/19











BRIANNA ZELAYA STUDENT       Oct 22, 2019 16:14


PETER POTTER             Oct 22, 2019 17:16

## 2020-04-10 ENCOUNTER — HOSPITAL ENCOUNTER (INPATIENT)
Dept: HOSPITAL 75 - ER | Age: 70
LOS: 3 days | Discharge: HOME | DRG: 563 | End: 2020-04-13
Attending: INTERNAL MEDICINE | Admitting: INTERNAL MEDICINE
Payer: MEDICARE

## 2020-04-10 VITALS — BODY MASS INDEX: 24.49 KG/M2 | HEIGHT: 65.75 IN | WEIGHT: 150.58 LBS

## 2020-04-10 DIAGNOSIS — R79.1: ICD-10-CM

## 2020-04-10 DIAGNOSIS — I10: ICD-10-CM

## 2020-04-10 DIAGNOSIS — S00.83XA: ICD-10-CM

## 2020-04-10 DIAGNOSIS — F10.929: ICD-10-CM

## 2020-04-10 DIAGNOSIS — F17.220: ICD-10-CM

## 2020-04-10 DIAGNOSIS — M47.9: ICD-10-CM

## 2020-04-10 DIAGNOSIS — F41.9: ICD-10-CM
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
## 2020-04-10 DIAGNOSIS — F32.9: ICD-10-CM

## 2020-04-10 DIAGNOSIS — S00.12XA: ICD-10-CM

## 2020-04-10 DIAGNOSIS — S42.292A: Primary | ICD-10-CM

## 2020-04-10 DIAGNOSIS — E11.9: ICD-10-CM

## 2020-04-10 DIAGNOSIS — E87.1: ICD-10-CM

## 2020-04-10 LAB
ALBUMIN SERPL-MCNC: 4.4 GM/DL (ref 3.2–4.5)
ALP SERPL-CCNC: 113 U/L (ref 40–136)
ALT SERPL-CCNC: 17 U/L (ref 0–55)
APAP SERPL-MCNC: < 10 UG/ML (ref 10–30)
APTT BLD: 39 SEC (ref 24–35)
BASOPHILS # BLD AUTO: 0.1 10^3/UL (ref 0–0.1)
BASOPHILS NFR BLD AUTO: 1 % (ref 0–10)
BILIRUB SERPL-MCNC: 0.9 MG/DL (ref 0.1–1)
BUN/CREAT SERPL: 7
CALCIUM SERPL-MCNC: 8.8 MG/DL (ref 8.5–10.1)
CHLORIDE SERPL-SCNC: 89 MMOL/L (ref 98–107)
CO2 SERPL-SCNC: 18 MMOL/L (ref 21–32)
CREAT SERPL-MCNC: 0.83 MG/DL (ref 0.6–1.3)
EOSINOPHIL # BLD AUTO: 0.5 10^3/UL (ref 0–0.3)
EOSINOPHIL NFR BLD AUTO: 5 % (ref 0–10)
ERYTHROCYTE [DISTWIDTH] IN BLOOD BY AUTOMATED COUNT: 13.5 % (ref 10–14.5)
GFR SERPLBLD BASED ON 1.73 SQ M-ARVRAT: > 60 ML/MIN
GLUCOSE SERPL-MCNC: 149 MG/DL (ref 70–105)
HCT VFR BLD CALC: 44 % (ref 35–52)
HGB BLD-MCNC: 15.2 G/DL (ref 11.5–16)
INR PPP: 1.5 (ref 0.8–1.4)
LYMPHOCYTES # BLD AUTO: 3.3 X 10^3 (ref 1–4)
LYMPHOCYTES NFR BLD AUTO: 38 % (ref 12–44)
MANUAL DIFFERENTIAL PERFORMED BLD QL: NO
MCH RBC QN AUTO: 33 PG (ref 25–34)
MCHC RBC AUTO-ENTMCNC: 35 G/DL (ref 32–36)
MCV RBC AUTO: 95 FL (ref 80–99)
MONOCYTES # BLD AUTO: 0.5 X 10^3 (ref 0–1)
MONOCYTES NFR BLD AUTO: 6 % (ref 0–12)
NEUTROPHILS # BLD AUTO: 4.4 X 10^3 (ref 1.8–7.8)
NEUTROPHILS NFR BLD AUTO: 50 % (ref 42–75)
PLATELET # BLD: 209 10^3/UL (ref 130–400)
PMV BLD AUTO: 9.8 FL (ref 7.4–10.4)
POTASSIUM SERPL-SCNC: 3.7 MMOL/L (ref 3.6–5)
PROT SERPL-MCNC: 8.4 GM/DL (ref 6.4–8.2)
PROTHROMBIN TIME: 18.7 SEC (ref 12.2–14.7)
SODIUM SERPL-SCNC: 123 MMOL/L (ref 135–145)
WBC # BLD AUTO: 8.7 10^3/UL (ref 4.3–11)

## 2020-04-10 PROCEDURE — 96374 THER/PROPH/DIAG INJ IV PUSH: CPT

## 2020-04-10 PROCEDURE — 82436 ASSAY OF URINE CHLORIDE: CPT

## 2020-04-10 PROCEDURE — 84300 ASSAY OF URINE SODIUM: CPT

## 2020-04-10 PROCEDURE — 71045 X-RAY EXAM CHEST 1 VIEW: CPT

## 2020-04-10 PROCEDURE — 80329 ANALGESICS NON-OPIOID 1 OR 2: CPT

## 2020-04-10 PROCEDURE — 94640 AIRWAY INHALATION TREATMENT: CPT

## 2020-04-10 PROCEDURE — 36415 COLL VENOUS BLD VENIPUNCTURE: CPT

## 2020-04-10 PROCEDURE — 96361 HYDRATE IV INFUSION ADD-ON: CPT

## 2020-04-10 PROCEDURE — 72170 X-RAY EXAM OF PELVIS: CPT

## 2020-04-10 PROCEDURE — 82962 GLUCOSE BLOOD TEST: CPT

## 2020-04-10 PROCEDURE — 70486 CT MAXILLOFACIAL W/O DYE: CPT

## 2020-04-10 PROCEDURE — 80306 DRUG TEST PRSMV INSTRMNT: CPT

## 2020-04-10 PROCEDURE — 93041 RHYTHM ECG TRACING: CPT

## 2020-04-10 PROCEDURE — 80320 DRUG SCREEN QUANTALCOHOLS: CPT

## 2020-04-10 PROCEDURE — 83735 ASSAY OF MAGNESIUM: CPT

## 2020-04-10 PROCEDURE — 85730 THROMBOPLASTIN TIME PARTIAL: CPT

## 2020-04-10 PROCEDURE — 70450 CT HEAD/BRAIN W/O DYE: CPT

## 2020-04-10 PROCEDURE — 72125 CT NECK SPINE W/O DYE: CPT

## 2020-04-10 PROCEDURE — 81000 URINALYSIS NONAUTO W/SCOPE: CPT

## 2020-04-10 PROCEDURE — 73060 X-RAY EXAM OF HUMERUS: CPT

## 2020-04-10 PROCEDURE — 84133 ASSAY OF URINE POTASSIUM: CPT

## 2020-04-10 PROCEDURE — 85610 PROTHROMBIN TIME: CPT

## 2020-04-10 PROCEDURE — 85025 COMPLETE CBC W/AUTO DIFF WBC: CPT

## 2020-04-10 PROCEDURE — 73030 X-RAY EXAM OF SHOULDER: CPT

## 2020-04-10 PROCEDURE — 80053 COMPREHEN METABOLIC PANEL: CPT

## 2020-04-10 NOTE — XMS REPORT
[facility]

                             Created on: 2020



Radha Hardin

External Reference #: [id number]

: 1954

Sex: Female



Demographics





                          Address                   [address] [place]  [address]

 

                          Preferred Language        Unknown

 

                          Marital Status            Unknown

 

                          Temple Affiliation     Unknown

 

                          Race                      Unknown

 

                          Ethnic Group              Unknown





Author





                          Author                    Radha DOWNS

 

                          Organization              [facility]

 

                          Address                   [address] [place]  [address]



 

                          Phone                     [phone]







Care Team Providers





                    Care Team Member Name Role                Phone

 

                    DOROTEO DOWNS       Unavailable         [phone]







PROBLEMS





          Type      Condition ICD9-CM Code JXG11-VC Code Onset Dates Condition S

tatus SNOMED 

Code

 

          Problem   Lumbar degenerative disc disease           M51.36           

   Active    74938574

 

          Problem   Gastro-esophageal reflux disease without esophagitis        

   K21.9               Active    

950797049

 

                Problem         Migraine without aura and without status migrain

osus, not intractable                 

G43.009                                 Active              225114054

 

          Problem   Degenerative disc disease, cervical           M50.30        

      Active    21432006

 

          Problem   Thyroid nodule           E04.1               Active    25817

5005

 

          Problem   Gastric reflux           K21.9               Active    40988

7003

 

          Problem   Left lower quadrant abdominal pain           R10.32         

     Active    286789714

 

          Problem   COPD mixed type           J44.9               Active    1364

5005

 

                Problem         Incomplete tear of right rotator cuff, unspecifi

ed whether traumatic                 

M75.111                                 Active              463742142

 

          Problem   Chronic kidney disease, stage 3           N18.3             

  Active    592663035

 

          Problem   COPD exacerbation           J44.1               Active    19

6991642

 

                          Problem                   Atherosclerosis of native co

ronary artery with angina pectoris, 

unspecified whether native or transplanted heart              I25.119           

        Active       

[id number]

 

          Problem   Rotator cuff tear           M75.100             Active    41

38970

 

                          Problem                   Type 2 diabetes mellitus wit

h diabetic polyneuropathy, without long-term

current use of insulin              E11.42                    Active       47071

006

 

          Problem   Hypertensive heart disease with heart failure           I11.

0               Active    18995176

 

          Problem   Mixed incontinence           N39.46              Active    4

43412088

 

          Problem   Essential (primary) hypertension           I10              

   Active    95967416

 

          Problem   Other schizoaffective disorders           F25.8             

  Active    58710657

 

          Problem   Urge incontinence           N39.41              Active    87

729434

 

          Problem   GERD with esophagitis           K21.0               Active  

  486841680

 

          Problem   Major depression           F32.9               Active    370

391857

 

           Problem    Moderate episode of recurrent major depressive disorder   

         F33.1                 Active

                                        [id number]







ALLERGIES

No Information



ENCOUNTERS





                Encounter       Location        Date            Diagnosis

 

                [facility]  [facility]  [id number] [address] 25

 Mar, 2020    

Encounter for Medicare annual wellness exam Z00.00 ; COPD mixed type J44.9 ; 
Essential (primary) hypertension I10 ; Type 2 diabetes mellitus with diabetic 
polyneuropathy, without long-term current use of insulin E11.42 ; 
Atherosclerosis of native coronary artery with angina pectoris, unspecified 
whether native or transplanted heart I25.119 ; Hypertensive heart disease with 
heart failure I11.0 ; Other schizoaffective disorders F25.8 ; Moderate episode 
of recurrent major depressive disorder F33.1 ; Chronic kidney disease, stage 3 
N18.3 ; Degenerative disc disease, cervical M50.30 and Gastro-esophageal reflux 
disease without esophagitis K21.9

 

                [facility]  [facility]  [id number] [address] 24

 Mar, 2020     

 

                [facility]  [facility] [address] [place], [place] [id number] [place] [address] 20

 Mar, 2020     

 

                    [address] [place] [address] 19 

Mar, 2020                                

 

                [facility]  [facility]  [address] [place] [address] 19

 Mar, 2020     

 

                [facility]  [facility]  [address] [place] [address] 18

 Mar, 2020     

 

                    [facility] [address] [place] [id number] [place] [address] 17 

Mar, 2020                                

 

                [facility]  [facility]  [address] [place] [address] 17

 Mar, 2020     

 

                [facility]  [place]  [id number] [place] [address] 16

 Mar, 2020     

 

                [facility]  [facility]  [address] [place] [address] 16

 Mar, 2020     

 

                [facility]  [place]  [id number] [address] 13

 Mar, 2020    

Varicose veins of both lower extremities, unspecified whether complicated I83.93

 

                [facility]  [facility]  [facility] [address] 10

 Mar, 2020    Major

depression F32.9

 

                [facility]  [facility]  [id number] [address] 06

 Mar, 2020    GERD 

with esophagitis K21.0 ; Other schizoaffective disorders F25.8 ; Type 2 diabetes
mellitus with diabetic polyneuropathy, without long-term current use of insulin 
E11.42 ; COPD mixed type J44.9 and Post-menopausal Z78.0

 

                [facility]  [facility]  [facility] [address] 04

 Mar, 2020    Other

schizoaffective disorders F25.8 and Type 2 diabetes mellitus with diabetic 
polyneuropathy, without long-term current use of insulin E11.42

 

                    [facility] [address] [facility] [place] [address] 03 

Mar, 2020                                

 

                [facility]  [facility]  [facility], [place] [address] 02

 Mar, 2020     

 

                [facility]  [facility] [address] [place] [id number] [address] 28

 2020    Type 

2 diabetes mellitus with diabetic polyneuropathy, without long-term current use 
of insulin E11.42 ; Moderate episode of recurrent major depressive disorder 
F33.1 and Other schizoaffective disorders F25.8

 

                [facility]  [facility]  [facility], [place] [address]      

 

                [facility]  [facility]  [id number] [address]      

 

                [facility]  [place]  [facility], [place] [address]      

 

                [facility]  [place]  [id number] [address] 21

 2020    

Incomplete tear of right rotator cuff, unspecified whether traumatic M75.111

 

                [facility]  [place]  [facility] [place] [address]      

 

                [facility]  [place]  [facility] [place] [id number] [place] [address] 19

 Feb,      

 

                [facility]  [place]  [facility] [place] [id number] [place] [address] 18

 Feb,     Other

schizoaffective disorders F25.8

 

                [facility]  [place]  [facility] [place] [id number] [place] [address] 13

 Feb,      

 

                [facility]  [place]  [facility] [place] [id number] [place] [address] 12

 Feb,      

 

                [facility]  [place]  [facility] [place] [id number] [place] [address] 11

 Feb,      

 

                [facility]  [place]  [facility] [address] [id number] [place] [address] 10

 Feb,      

 

                [facility]  [place]  [facility] [place] [id number] [place] [address] 07

 Feb,      

 

                [facility]  [place]  [facility] [place] [id number] [place] [address] 05

 Feb,      

 

                [facility]  [place]  [facility] [place] [id number] [place] [address] 04

 Feb,      

 

                [facility]  [place]  [facility] [address] [id number] [place] [address] 03

 Feb,      

 

                [facility]  [place]  [facility] [place] [id number] [place] [address]      

 

                [facility]  [place]  [facility] [place] [address]     

Incomplete tear of right rotator cuff, unspecified whether traumatic M75.111

 

                [facility]  [place]  [facility] [place] [id number] [place] [address]      

 

                [facility]  [place]  [facility] [place] [address]     

Rotator cuff tear M75.100 ; Type 2 diabetes mellitus with diabetic 
polyneuropathy, without long-term current use of insulin E11.42 ; High risk 
medication use Z79.899 and Urge incontinence N39.41

 

                [facility]  [place]  [facility] [place] [address]     

Dysuria R30.0

 

                [facility]  [place]  [facility] [place] [address]      

 

                [facility]  [place]  [facility] [place] [address]      

 

                [facility]  [place]  [facility] [place] [address]      

 

                [facility]  [place]  [facility] [place] [address]      

 

                [facility]  [place]  [facility] [place] [address]     

Lumbago M54.5

 

                [facility]  [place]  [facility] [place] [address]      

 

                [facility]  [place]  [facility] [place] [address] 10

 Yung, 2020     

 

                [facility]  [place]  [facility] [place] [address]      

 

                [facility]  [place]  [facility] [place] [address] 07

 2020     

 

                [facility]  [place]  [facility] [place] [address]      

 

                [facility]  [place]  [facility] [place] [address]     

Incomplete tear of right rotator cuff, unspecified whether traumatic M75.111 ; 
Essential (primary) hypertension I10 and Thyroid nodule E04.1

 

                    [facility] [address] [facility] [place] [address]                                 

 

                [facility]  [place] [address] [place] [id number] [address] 31

 Dec, 2019     

 

                [facility]  [place] [address] [facility] [place] [address] 30

 Dec, 2019    COPD 

exacerbation J44.1

 

                [facility]  [place]  [facility] [place] [id number] [place] [address] 27

 Dec, 2019     

 

                [facility]  [place]  [facility] [place] [address] 26

 Dec, 2019     

 

                [facility]  [place] [address] [place] [address] ST [id number] [place] [address] 20

 Dec, 2019    

Nausea R11.0 ; Incomplete tear of right rotator cuff, unspecified whether 
traumatic M75.111 and Essential (primary) hypertension I10

 

                [facility]  [place] [address] [place] [address] [id number] [address] 20

 Dec, 2019     

 

                [facility]  [place] [address] [place] [id number] [address] 18

 Dec, 2019     

 

                [facility]  [place] [address] [place] [id number] [address] 16

 Dec, 2019     

 

                [facility]  [place] [address] [place] [id number] [address] 11

 Dec, 2019    

Rotator cuff tear M75.100

 

                [facility]  [place] [address] [place] [address] ST [id number] [place] [address] 09

 Dec, 2019     

 

                [facility]  [place] [address] [place] [id number] [address] 05

 Dec, 2019    

Essential (primary) hypertension I10 ; Type 2 diabetes mellitus with diabetic 
polyneuropathy, without long-term current use of insulin E11.42 ; Rotator cuff 
tear M75.100 and Chronic kidney disease, stage 3 N18.3

 

                [facility]  [place]  [facility] [place] [id number] [place] [address] 03

 Dec, 2019     

 

                [facility]  [place] [address] [place] [id number] [address] 02

 Dec, 2019     

 

                [facility]  [place] [address] [place] [id number] [address] 02

 Dec, 2019     

 

                [facility]  [place] [address] [place] [id number] [address]      

 

                [facility]  [place] [address] [place] [id number] [address]      

 

                [facility]  [place] [address] [place] [id number] [address]      

 

                [facility]  [place] [address] [place] [id number] [address]     

Rotator cuff tear M75.100

 

                [facility]  [place] [address] [place] [id number] [address]      

 

                [facility]  [place] [address] [place] [id number] [address]     

Rotator cuff tear M75.100 ; Major depressive disorder with current active 
episode, unspecified depression episode severity, unspecified whether recurrent 
F32.9 ; Type 2 diabetes mellitus with diabetic polyneuropathy, without long-term
current use of insulin E11.42 and Essential (primary) hypertension I10

 

                [facility]  [place] [address] [place] [id number] [address]      

 

                [facility]  [place] [address] [place] [id number] [address]      

 

                [facility]  [place] [address] [place] [id number] [address] 31

 Oct, 2019     

 

                [facility]  [place] [address] [place] [id number] [address] 29

 Oct, 2019     

 

                [facility]  [place] [address] [place] [id number] [address] 21

 Oct, 2019    

Rotator cuff tear M75.100 ; Essential (primary) hypertension I10 and COPD mixed 
type J44.9

 

                [facility]  [place] [address] [place] [id number] [address] 18

 Oct, 2019    

Rotator cuff tear M75.100

 

                [facility]  [place] [address] [place] [id number] [address] 15

 Oct, 2019     

 

                [facility]  [place] [address] [place] [id number] [address] 11

 Oct, 2019    

Preprocedural examination Z01.818

 

                [facility]  [place] [address] [place] [address] [place] [id number] [address] 09

 Oct, 2019     

 

                [facility]  [place] [address] [place] [id number] [address] 09

 Oct, 2019     

 

                [facility]  [place] [address] [place] [id number] [address] 08

 Oct, 2019    

Traumatic complete tear of right rotator cuff, initial encounter S46.011A ; 
Essential (primary) hypertension I10 ; Encounter for immunization Z23 ; Leg 
cramps R25.2 ; COPD mixed type J44.9 and Fatigue R53.83

 

                [facility]  [place] [address] [place] [address] [id number] [address] 24

 Sep, 2019     

 

                [facility]  [place] [address] [place] [address] [id number] [address] 18

 Sep, 2019     

 

                [facility]  [place] [address] [place] [id number] [address] 16

 Sep, 2019     

 

                [facility]  [place] [address] [place] [address] [place] [id number] [address] 13

 Sep, 2019     

 

                [facility]  [place] [address] [place] [id number] [address] 12

 Sep, 2019    

Traumatic complete tear of right rotator cuff, initial encounter S46.011A ; Left
lower quadrant abdominal pain R10.32 ; Essential (primary) hypertension I10 ; 
COPD mixed type J44.9 and Long term use of drug Z79.899

 

                [facility]  [place] [address] [place] [address] [id number] [address] 10

 Sep, 2019     

 

                [facility]  [place] [address] [place] [address] [id number] [address] 09

 Sep, 2019    

Gastroenteritis and colitis, viral A08.4

 

                [facility]  [place] [address] [place] [address] [place] [id number] [place] [address] 04

 Sep, 2019     

 

                [facility]  [place] [address] [place] [id number] [address] 30

 Aug, 2019    

Traumatic complete tear of right rotator cuff, initial encounter S46.011A ; 
Chronic obstructive pulmonary disease, unspecified COPD type J44.9 and Mixed 
incontinence N39.46

 

                [facility]  [place] [address] [place] [id number] [address] 29

 Aug, 2019     

 

                [facility]  [place] [address] [place] [id number] [address] 27

 Aug, 2019     

 

                [facility]  [place] [address] [place] [id number] [address] 24

 Aug, 2019    

Injury of right shoulder, initial encounter S49.91XA

 

                [facility]  [place] [address] [place] [id number] [address] 05

 Aug, 2019     

 

                [facility]  [place] [place] [address] 03

 Aug, 2019     

 

                [facility]  [place] [place] [address]      

 

                [facility]  [place] [place] [address]      

 

                [facility]  [place] [place] [address]     

Essential (primary) hypertension I10 ; Type 2 diabetes mellitus with diabetic 
polyneuropathy, without long-term current use of insulin E11.42 ; COPD mixed 
type J44.9 ; Thyroid nodule E04.1 and Degenerative disc disease, cervical M50.30

 

                [facility]  [place] [address] [place] [id number] [address]      

 

                [facility]  [place] [address] [place] [id number] [address]      

 

                [facility]  [place] [address] [place] [id number] [address]      

 

                [facility]  [place] [address] [place] [id number] [address]     

Gastroenteritis and colitis, viral A08.4

 

                [facility]  [place] [address] [place] [id number] [address] 22

 2019    

Chronic obstructive pulmonary disease, unspecified COPD type J44.9

 

                [facility]  [facility] [address] [place], [place] [id number] [place] [address] 18

 2019    

Gastric reflux K21.9

 

                [facility]  [facility] [address] [place] [id number] [address] 18

 2019     

 

                [facility] [address] [place], [place] [id number] [place] [address] 14

 2019    

Essential (primary) hypertension I10

 

                [facility]  [facility] [address] [place], [place] [id number] [place] [address] 13

 2019     

 

                [facility] [address] [place], [place] [id number] [place] [address] 13

 2019    

Chronic obstructive pulmonary disease, unspecified COPD type J44.9 ; Type 2 
diabetes mellitus with diabetic polyneuropathy, without long-term current use of
insulin E11.42 ; Essential (primary) hypertension I10 ; Gastro-esophageal reflux
disease without esophagitis K21.9 and Varicella vaccination Z23

 

                [facility] [address] [place] [id number] [address] 05

 2019    

Dyspnea on exertion R06.09 and Infiltrate noted on imaging study R93.89

 

                [facility] [address] [place] [id number] [address] 28

 May, 2019    

Thyroid pain E07.89

 

                [facility] [address] [place] [id number] [address] 25

 May, 2019    

Thyroid pain E07.89

 

                [facility] [address] [place] [id number] [address] 03

 May, 2019    Other

schizoaffective disorders F25.8

 

                [facility] [address] [place] [address] [address]     Type 

2 diabetes mellitus with diabetic polyneuropathy, without long-term current use 
of insulin E11.42 ; Gastric reflux K21.9 and Diabetic retinopathy of right eye 
associated with type 2 diabetes mellitus, macular edema presence unspecified, 
unspecified retinopathy severity E11.319

 

                [facility]  [place] [address] [place], [place] [id number] [address] 20

 Mar, 2019     

 

                [facility]  [place] [address] [place], [place] [id number] [place] [address] 19

 Mar, 2019    Type 

2 diabetes mellitus with diabetic polyneuropathy, without long-term current use 
of insulin E11.42 and Gastric reflux K21.9

 

                [facility]  [place] [address] [place], [place] [id number] [address] 18

 Mar, 2019     

 

                [facility]  [place] [address] [place], [place] [id number] [address] 12

 Mar, 2019    Acute

vaginitis N76.0 and Other schizoaffective disorders F25.8

 

                    [facility] [address] [facility] [place] [address] 08 

Mar, 2019                                

 

                [facility]  [facility] [address] [place] [id number] [address] 01

 Mar, 2019     

 

                [facility]  [place] [address] [place] [id number] [address]      

 

                [facility]  [place] [address] [place] [id number] [address]      

 

                [facility]  [place] [address] [place] [id number] [address]      

 

                [facility]  [place] [address] [place] [id number] [address]     

Dental examination Z01.20 and Caries K02.9

 

                [facility]  [place] [address] [place] [id number] [address]     Type 

2 diabetes mellitus without complications E11.9

 

                [facility]  [place] [address] [place] [id number] [address]      

 

                [facility]  [place] [address] [place] [id number] [address]     

Migraine without aura and without status migrainosus, not intractable G43.009

 

                [facility]  [place]  [facility] [place] [id number] [place] [address]      

 

                [facility]  [place]  [facility] [address] [id number] [place] [address]      

 

                [facility]  [place]  [facility] [address] [id number] [place] [address] 17

 2019     

 

                [facility]  [place]  [facility] [place] [id number] [place] [address] 09

 2019    

Shortness of breath R06.02

 

                [facility]  [place]  [facility] [place] [id number] [place] [address] 07

 2019    

Dental examination Z01.20

 

                [facility]  [place]  [facility] [place] [id number] [place] [address] 05

 2019    Acute

intractable headache, unspecified headache type R51 and BMI 40.0-44.9, adult 
Z68.41

 

                [facility]  [place]  [facility] [address] [id number] [place] [address]      

 

                [facility]  [place]  [facility] [address] [id number] [place] [address]      

 

                [facility]  [place] [address] [place] [id number] [address]      

 

                [facility]  [place]  [facility] [address] [id number] [place] [address] 31

 Dec, 2018     

 

                [facility]  [place]  [facility] [place] [id number] [place] [address] 20

 Dec, 2018     

 

                [facility]  [place]  [facility] [place] [id number] [place] [address] 20

 Dec, 2018     

 

                [facility]  [place]  [facility] [place] [id number] [place] [address] 18

 Dec, 2018    

Impetigo L01.00 and Sore in nose J34.89

 

                [facility]  [place]  [facility] [place] [id number] [place] [address] 17

 Dec, 2018    

Degenerative disc disease, cervical M50.30

 

                [facility]  [place] [address] [place] [id number] [address] 11

 Dec, 2018    Sore 

in nose J34.89

 

                [facility]  [place] [address] [place] [id number] [address] 11

 Dec, 2018    Sore 

in nose J34.89 ; Excoriation of abdomen, initial encounter S30.811A ; Encounter 
for immunization Z23 and BMI 40.0-44.9, adult Z68.41

 

                [facility]  [place] [address] [place] [id number] [address]      

 

                [facility]  [place] [place] [address]      

 

                [facility]  [place] [place] [address]     Type 

2 diabetes mellitus without complications E11.9

 

                [facility]  [place] [place] [address]      

 

                [facility]  [place] [address] [place], [place] [id number] [address]     

Degenerative disc disease, cervical M50.30

 

                [facility]  [place] [place] [address]     

Cervicalgia M54.2

 

                [facility]  [place] [place] [address]      

 

                [facility]  [place] [address] [place], [place] [id number] [address]      

 

                [facility]  [place] [address] [place] [id number] [address]      

 

                [facility]  [place] [address] [place], [place] [id number] [address]     

Gastroenteritis and colitis, viral A08.4

 

                [facility]  [place] [address] [place], [place] [id number] [address] 22

 Oct, 2018    Type 

2 diabetes mellitus without complications E11.9 ; Degenerative disc disease, 
cervical M50.30 ; Essential (primary) hypertension I10 ; Gastro-esophageal 
reflux disease without esophagitis K21.9 and BMI 40.0-44.9, adult Z68.41

 

                [facility] [address] [place], [place] [id number] [address] 26

 Sep, 2018    

Degenerative disc disease, cervical M50.30 ; Type 2 diabetes mellitus without 
complications E11.9 ; Encounter for immunization Z23 ; Long term current use of 
insulin Z79.4 and BMI 40.0-44.9, adult Z68.41

 

                [facility]  [place] [id number] [address] 20

 Sep, 2018    

Degenerative cervical disc M50.30

 

                [facility] [address] [place], [place] [address], [place] [address] 19

 Sep, 2018     

 

                [facility] [address] [place], [place] [id number] [address] 18

 Sep, 2018    

Generalized pruritus L29.9 ; Lumbar degenerative disc disease M51.36 and BMI 
40.0-44.9, adult Z68.41

 

                [facility] [address] [place], [place] [id number] [address] 06

 Sep, 2018     

 

                [facility] [address] [place] [address], [place] [address] 04

 Sep, 2018    

Cervicalgia M54.2 and Essential (primary) hypertension I10

 

                [facility] [address] [place], [place] [id number] [address] 27

 Aug, 2018    Type 

2 diabetes mellitus without complications E11.9

 

                [facility] [address] [place], [place] [id number] [address] 30

 2018    

Gastroenteritis and colitis, viral A08.4 and BMI 40.0-44.9, adult Z68.41

 

                [address] [place] [address] 16

 2018    Type 

2 diabetes mellitus without complications E11.9 ; Essential (primary) 
hypertension I10 ; Screening for breast cancer Z12.31 ; Degenerative lumbar disc
M51.36 ; BMI 40.0-44.9, adult Z68.41 and Morbid obesity E66.01

 

                [address] [address] 26 , 

18    BMI 40.0-44.9, 

adult Z68.41 and Other schizoaffective disorders F25.8

 

                [address] [address] , 

18    Other 

schizoaffective disorders F25.8 ; Lumbar degenerative disc disease M51.36 and 
BMI 40.0-44.9, adult Z68.41

 

                [address] [address] 21 May, 

18    Shortness of 

breath R06.02

 

                [address] [address] 21 May, 

18     

 

                [address] [address] 15 May, 

18     

 

                [address] [address] 14 May, 20

18     

 

                [address] [address] 12 May, 20

18     

 

                [address] [address] 08 May, 20

18    Vaginal 

bleeding N93.9

 

                [address] [address] 01 May, 20

18    BMI 40.0-44.9, 

adult Z68.41 and Vaginal bleeding N93.9

 

                [address] [address] , 

18    Dysfunction of 

both eustachian tubes H69.83

 

                [address] [address] 10 Apr, 

18    BMI 40.0-44.9, 

adult Z68.41 ; Essential (primary) hypertension I10 and Impetigo L01.00

 

                [address] [address] 27 Mar, 

18     

 

                [address] [address] 26 Mar, 20

18    Type 2 diabetes

mellitus with diabetic polyneuropathy, without long-term current use of insulin 
E11.42 ; BMI 40.0-44.9, adult Z68.41 ; Other schizoaffective disorders F25.8 ; 
Gastro-esophageal reflux disease without esophagitis K21.9 ; Essential (primary)
hypertension I10 and Impetigo L01.00

 

                [address] [address] 16 Mar, 20

18    Type 2 diabetes

mellitus with diabetic polyneuropathy, without long-term current use of insulin 
E11.42

 

                [address] [address]      

 

                [address] [address] 

18     

 

                [address] [address] 

18    Type 2 diabetes

mellitus with diabetic polyneuropathy, without long-term current use of insulin 
E11.42 ; Other schizoaffective disorders F25.8 ; Gastro-esophageal reflux 
disease without esophagitis K21.9 ; Essential (primary) hypertension I10 and 
Impetigo L01.00

 

                [address] [address] 13 Dec, 20

17    Shortness of 

breath R06.02 ; Type 2 diabetes mellitus without complications E11.9 ; BMI 40.0-
44.9, adult Z68.41 and Pre-ulcerative corn or callous L84

 

                [address] [address] 06 Dec, 20

17     

 

                [address] [address] 

17    Medicare 

welcome exam Z00.00 ; BMI 40.0-44.9, adult Z68.41 ; Medicare annual wellness 
visit, initial Z00.00 ; Medicare annual wellness visit, subsequent Z00.00 and 
Encounter for immunization Z23

 

                [address] [address] 

17    Foot callus L84

and Type 2 diabetes mellitus without complications E11.9

 

                [facility] [place] [address] 30 Oct, 

17     

 

                [facility] [place] [address] 09 Oct, 

17    Encounter for 

immunization Z23

 

                [facility] [place]   [address] [place] [address] 06 Sep, 

17    Type 2 diabetes

mellitus without complications E11.9 and Polyneuropathy associated with 
underlying disease G63

 

                [facility] [place]   [address] [place] [address] 31 Aug, 

17    Edema R60.9 ; 

Type 2 diabetes mellitus without complications E11.9 and Anemia, unspecified 
type D64.9

 

                [facility] [place]   [address] [place] [address] 23 Aug, 

17    Hip pain, left 

M25.552

 

                [facility] [place]   [address] [place] [address] 03 Aug, 

17     

 

                [facility] [place]   [address] [place] [address] 

17     

 

                [facility] [place]   [address] [place] [address] 

17     

 

                [facility] [place]   [address] [place] [address] 15 , 

17     

 

                [facility] [place]   [address] [place] [address] 14 , 

17     

 

                [facility] [place]   [address] [place] [address] 13 

17     

 

                [facility] [place]   [address] [place] [address] 12 , 

17     

 

                [facility] [place]   [address] [place] [address] 08 , 

17     

 

                [facility] [place]   [address] [place] [address] 06 , 

17    Type 2 diabetes

mellitus without complications E11.9 and Essential (primary) hypertension I10

 

                [facility] [place]   [address] [place] [address] 22 May, 

17     

 

                [facility] [place]   [address] [place] [address] 16 May, 20

17    Frequency of 

micturition R35.0 ; Dysuria R30.0 and Type 2 diabetes mellitus without 
complications E11.9

 

                [facility]   [address] [place] [address] 11 Apr, 

17     

 

                [facility] [address] [address] 

17     

 

                [facility] [address] [address] 

17     

 

                [facility] [address] [address] 

17     

 

                [facility] [address] [address] 08 Mar, 20

17    Cellulitis of 

head except face L03.811

 

                [facility] [address] [address] 

17     

 

                [facility] [address] [address] 

17     

 

                [facility] [address] [address] 

17    Flu-like 

symptoms R68.89 and Influenza B J10.1

 

                [facility] [address] [address] 

17    Type 2 diabetes

mellitus without complications E11.9

 

                [facility] [address] [address] 

17     

 

                [address] [address] 10 Yung, 20

17    Type 2 diabetes

mellitus without complications E11.9 ; Anemia, unspecified type D64.9 ; 
Essential (primary) hypertension I10 and Fatigue R53.83

 

                [facility] [address] [address] 

17    Type 2 diabetes

mellitus without complications E11.9 and Foot callus L84

 

                [address] [address] 22 Dec, 20

16     

 

                [facility]   [address] [place] [address] 05 Dec, 20

16     

 

                [facility]   [address] [place] [address] 

16     

 

                [address] [address] 

16    Shortness of 

breath R06.02

 

                [address] [address] 02 

16    Shortness of 

breath R06.02

 

                [facility]   [address] [place] [address] 06 Oct, 20

16    Shortness of 

breath R06.02

 

                [address] [address] 19 Aug, 20

16    Screening for 

cervical cancer Z12.4 ; Edema R60.9 ; Screening for breast cancer Z12.39 and 
Screening for colon cancer Z12.11

 

                [address] [address] 12 Aug, 20

16    Blood in stool 

K92.1

 

                [address] [address] 11 Aug, 20

16    Anemia, 

unspecified type D64.9

 

                [address] [address] 09 Aug, 20

16    Edema R60.9 ; 

Shortness of breath R06.02 ; Anemia, unspecified type D64.9 and Gastroesophageal
reflux disease without esophagitis K21.9

 

                [facility]   [address] [place] [address] 05 Aug, 20

16    Shortness of 

breath R06.02 and Edema R60.9

 

                [facility]   [address] [place] [address] 04 Aug, 20

16     

 

                [address] [address] 04 Aug, 20

16    Shortness of 

breath R06.02 and Edema R60.9

 

                [address] [address] 01 Aug, 20

16     

 

                    [facility] [address] [facility] [id number] [place] [address]                                Shortness of breath R06.02

 

                [address] [address] 11 

16    Shortness of 

breath R06.02 ; Edema R60.9 and Gastroesophageal reflux disease without 
esophagitis K21.9

 

                [address] [address] 

16    Shortness of 

breath R06.02

 

                [address] [address] , 

16     

 

                [address] [address] 

16    Dental 

examination Z01.20

 

                [address] [address] 10 , 

16     

 

                [address] [address] 10 

16     

 

                [address] [address] 09 

16    Edema R60.9 ; 

Type 2 diabetes mellitus without complications E11.9 and Fatigue R53.83

 

                [address] [address] 18 May, 20

16    Edema R60.9 and

Abdominal muscle strain, initial encounter S39.011A

 

                [address] [address] 

16     

 

                [address] [address] 18 

16    Hip pain, right

M25.551 and Edema R60.9

 

                [address] [address] 08 

16     

 

                [address] [address] 04 

16    Edema R60.9 and

Impetigo L01.00

 

                [address] [address] 

16    Type 2 diabetes

mellitus without complications E11.9 and Edema R60.9

 

                [facility] [address] [address] 

16     

 

                [facility] [address] [address] 

16    Contusion of 

thigh, right S70.11XA

 

                [facility] [address] [address] 

16    Hip pain, right

M25.551

 

                [facility] [address] [address] 

16     

 

                [facility] [address] [address] 23 Dec, 20

15    Left hip pain 

M25.552

 

                [facility] [address] [address] 04 Dec, 20

15    Acute maxillary

sinusitis, recurrence not specified J01.00 and Vomiting, nausea presence 
unspecified, unspecified intactability, vomiting of unspecified type R11.10

 

                [facility] [address] [address] 01 Dec, 20

15    Acute maxillary

sinusitis, recurrence not specified J01.00

 

                [facility] [address] [address] 08 Oct, 20

15    Acquired 

spondylolisthesis of lumbosacral region M43.17 ; Encounter for immunization Z23 
and Cholelithiasis K80.20

 

                [address] [address] 28 Sep, 20

15     

 

                [facility] [address] [address] 24 Sep, 20

15    Lumbar 

radiculopathy 724.4

 

                [address] [address] 21 Sep, 20

15     

 

                [address] [address] 27 Aug, 20

15    Esophageal 

reflux 530.81

 

                [address] [address] 20 Aug, 20

15    Esophageal 

reflux 530.81 ; Essential hypertension, benign 401.1 and Diabetes mellitus 
without mention of complication, type II or unspecified type, not stated as 
uncontrolled 250.00

 

                [facility] [address] [address] 07 Aug, 20

15     

 

                [facility] [address] [address] 05 Aug, 20

15    Myalgia and 

myositis 729.1

 

                [facility] [address] [address] 

15    Upper 

respiratory infection 465.9 ; Other symptoms involving skin and integumentary 
tissues 782.9 and Factitial dermatitis 698.4

 

                [address] [address] 

15    Factitial 

dermatitis 698.4

 

                [address] [address] 

15     

 

                [address] [address] 

15    Esophageal 

reflux 530.81

 

                [address] [address] 

15     

 

                [address] [address] 

15    Esophageal 

reflux 530.81

 

                [address] [address] 27 May, 20

15     

 

                [address] [address] 22 May, 20

15    Dyspepsia 536.8

and Epigastric pain 789.06

 

                [address] [address] 05 May, 20

15     

 

                [facility] [address] [address] 04 May, 20

15     

 

                [facility] [address] [address] 04 May, 20

15    Impetigo 684

 

                [address] [address] 30 

15    Other symptoms 

involving skin and integumentary tissues 782.9 and Seborrheic keratosis 702.19

 

                    [facility] [address] [address] 14 

2015                                

 

                    [facility] [address] [address]                                 

 

                [facility] [place] [address] 20 Mar, 20

15     

 

                    [facility] [address] [address] 20 

Mar, 2015                                

 

                [facility] [place] [address] 18 Mar, 20

15     

 

                    [facility] [address] [address] 18 

Mar, 2015                                

 

                [facility] [address] 16 Mar, 20

15     

 

                    [facility] [address] [address] 16 

Mar, 2015                                

 

                [facility] [address] 05 Mar, 20

15     

 

                    [facility] [address] [place] [address] 05 

Mar, 2015                                

 

                [facility] [address] 

15     

 

                    [facility] [address] [address]                                 

 

                [facility] [address] 

15     

 

                    [facility] [address] [address]                                 

 

                [facility] [address] 08 Dec, 20

14     

 

                    [facility] [address] [address] 08 

Dec, 2014                                

 

                [facility] [place] [address] , 

14     

 

                    [facility] [id number] [place] [address]                                 

 

                [facility] [place] [address] 08 Oct, 20

14     

 

                    [facility] [address] [place] [address] 08 

Oct, 2014                                

 

                [facility] [place] [address] 02 Oct, 20

14     

 

                    [facility] [address] [address] 02 

Oct, 2014                                

 

                    [facility] [facility] [address] [address] 12 

Sep, 2014                                

 

                [facility] [place] [address] 09 Sep, 20

14     

 

                    [facility] [address] [address] 09 

Sep, 2014                                

 

                    [facility] [id number] [place] [phone] 03 

Sep, 2014                                

 

                [facility] [place] [address] 03 Sep, 20

14     

 

                [facility] [place] [address] 

14     

 

                    [facility] [id number] [place] [phone]                                 

 

                [facility] [place] [address] 02 Mar, 20

14     

 

                    [facility] [id number] [place] [phone] 02 

Mar, 2014                                

 

                [facility] [place] [address] 

14     

 

                    [facility] [address] [address]                                 

 

                    [facility] [id number] [place] [phone]                                 

 

                [facility] [place] [address] 

14     

 

                    [facility] [address] [address]                                 

 

                [facility] [place] [address] 

14     

 

                    [place] [address] [address]                                 

 

                [facility] [place] [address] 

13     

 

                    [place] [address] [address]                                 

 

                [facility]   [address] [place] [address] 

13     

 

                    [place] [address] [address]                                 

 

                [facility] [place] [address] 

13     

 

                    [place] [address] [address]                                 







IMMUNIZATIONS

No Known Immunizations



SOCIAL HISTORY

Never Assessed



REASON FOR VISIT





PLAN OF CARE





VITAL SIGNS





MEDICATIONS

Unknown Medications



RESULTS

No Results



PROCEDURES

No Known procedures



INSTRUCTIONS





MEDICATIONS ADMINISTERED

No Known Medications



MEDICAL (GENERAL) HISTORY





                    Type                Description         Date

 

                    Medical History     Other symptoms involving skin and integu

mentary tissues  

 

                    Medical History     Impetigo             

 

                    Medical History     Schizoaffective disorder, unspecified  

 

                    Medical History     Essential hypertension, benign  

 

                          Medical History           Diabetes mellitus without me

ntion of complication, type II or 

unspecified type, not stated as uncontrolled  

 

                    Medical History     Urinary frequency    

 

                    Medical History     Encounter for long-term (current) use of

 other medications  

 

                    Medical History     Esophageal reflux    

 

                    Medical History     DIABETES TYPE II     

 

                    Medical History     HIGH BLOOD PRESSURE- pt states sometimes

 it goes low  

 

                    Medical History     BACK TROUBLE         

 

                    Surgical History    cholecystectomy      

 

                    Surgical History    heart cath          2019

 

                    Hospitalization History Surgery(s) only      

 

                          Hospitalization History   possible pneumonia or fluid 

on around heart-sent to [facility] 

heart/thinking COPD                     2019

## 2020-04-10 NOTE — XMS REPORT
Continuity of Care Document

                             Created on: 04/10/2020



BELLA HAMILTON

External Reference #: [id number]

: 1954

Sex: Female



Demographics





                          Address                   [address] [place]  [address]

 

                          Home Phone                [phone] x

 

                          Preferred Language        Unknown

 

                          Marital Status            Unknown

 

                          [place] Affiliation     Unknown

 

                          Race                      Unknown

 

                          Ethnic Group              Unknown





Author





                          Organization              Unknown

 

                          Address                   Unknown

 

                          Phone                     Unavailable



              



Allergies

      



             Active           Description           Code           Type         

  Severity   

                Reaction           Onset           Reported/Identified          

 

Relationship to Patient                 Clinical Status        

 

                Yes             No Known Allergies           [id number]           Dr varner Allergy        

           N/A           N/A                                                    

    

 

                Yes             No Known Medication Allergies                   

        Drug          

           N/A           N/A                                                    

    



                    



Medications

      



                Medication           Packaging           Start Date           St

op Date         

                    Route               Dosage              Sig        

 

                                DOCUSATE SODIUM                                 

        2019                                                      

 BIDPRN                 



 

                            ACETAMINOPHEN                                     2019                                                               Q4HPRN 

                



 

                            ACETAMINOPHEN                                     2019                                                               Q4HPRN 

                



 

                                ALUM-MAG HYDROXIDE-SIMETH                       

                  2019                                                      

 Q4HPRN       

          

 

                                MAGNESIUM HYDROXIDE                             

            2019                                                      

 HSPRN              

   

 

                                IPRATROPIUM-ALBUTEROL                           

              2019                                                      

 QIDRT            

     

 

                            MORPHINE                                     

019           

2019                                                               PRNACS 

                



 

                            NITROGLYCERIN                                     2019                                                               PRNCP  

                

 

                            ZOLPIDEM                                     

019           

2019                                                               HSPRN  

                

 

                            ACETAMINOPHEN                                     2019                                                               Q4HPRN 

                



 

                                ONDANSETRON HCL                                 

        2019                                                      

 Q6HPRN                 



 

                            BISACODYL                                     2019                                                               Q6HPRN 

                



 

                            BISACODYL                                     2019                                                               Q6HPRN 

                



 

                                ALUM-MAG HYDROXIDE-SIMETH                       

                  2019                                                      

 PRN          

       

 

                                MAGNESIUM HYDROXIDE                             

            2019                                                      

 PRN                

 

 

                                HYDROcodone-ACET 10-325MG                       

                  2019                                                      

 Q6HPRN       

          

 

                            tiZANidine                                     2019                                                               Q8HPRN 

                



 

                                ONDANSETRON HCL                                 

        2019                                                      

 Q6-8HPRN               

  

 

                                ENALAPRIL MALEATE                               

          2019                                                      

 QD                  

 

                            FAMOTIDINE                                     2019                                                               QD     

             

 

                                DEXLANSOPRAZOLE                                 

        2019                                                      

 ACB                  

 

                            MELOXICAM                                     2019                                                               QD     

             

 

                            ASPIRIN                                     20

19           

2019                                                               0700   

               

 

                            FLUoxetine                                     2019                                                               QD     

             

 

                            CETIRIZINE                                     2019                                                               QD     

             

 

                                B COMPLEX VITAMINS                              

           2019                                                      

 QD                  

 

                            FUROSEMIDE                                     2019                                                               BID4PM 

                



 

                                POTASSIUM CHLORIDE                              

           2019                                                      

 BIDWM               

  

 

                            PREGABALIN                                     2019                                                               BID    

              

 

                            SIMVASTATIN                                     06/0

2019                                                               HS     

             

 

                            predniSONE                                     2019                                                               0700   

               

 

                                INSULIN NOVOLOG                                 

        2019                                                      

 ONCE                  

 

                                INSULIN NOVOLOG                                 

        2019                                                      

 TIDWM                  



                                                                                
 



Problems

      



             Date Dx Coded           Attending           Type           Code    

       

Diagnosis                               Diagnosed By        

 

             2013           DOROTEO DOWNS MD                        V04.8

1           

FLU SHOT                                         

 

             2013           SATNAM GALARZA MD                        V04.8

1           

FLU SHOT                                         

 

             2013           ILIR BORGES DO                        V04.81

           FLU

SHOT                                             

 

             2013           DOROTEO DOWNS MD                        V04.8

1           

FLU SHOT                                         

 

             2013           DOROTEO DOWNS MD                        V04.8

1           

FLU SHOT                                         

 

             2013           DOROTEO DOWNS MD                        V04.8

1           

FLU SHOT                                         

 

             2013           DOROTEO DOWNS MD                        V04.8

1           

FLU SHOT                                         

 

             2013           DOROTEO DOWNS MD                        V04.8

1           

FLU SHOT                                         

 

             2013           SATNAM GALARZA MD                        V04.8

1           

FLU SHOT                                         

 

             2013           DOROTEO DOWNS MD                        V04.8

1           

FLU SHOT                                         

 

             2013           SATNAM GALARZA MD                        V04.8

1           

FLU SHOT                                         

 

             2013           SATNAM GALARZA MD                        V04.8

1           

FLU SHOT                                         

 

             2013           SATNAM GALARZA MD                        V04.8

1           

FLU SHOT                                         

 

             2014           SATNAM GALARZA MD                        466.0

           

BRONCHITIS, ACUTE                                

 

             2014           ILIR BORGES DO                        466.0 

          

BRONCHITIS, ACUTE                                

 

             2014           DOROTEO DOWNS MD                        466.0

           

BRONCHITIS, ACUTE                                

 

             2014           DOROTEO DOWNS MD                        466.0

           

BRONCHITIS, ACUTE                                

 

             2014           DOROTEO DOWNS MD                        466.0

           

BRONCHITIS, ACUTE                                

 

             2014           HIMANSHU GOMEZ, DOROTEO BABCOCK                        466.0

           

BRONCHITIS, ACUTE                                

 

             2014           HIMANSHU GOMEZ, DOROTEO BABCOCK                        466.0

           

BRONCHITIS, ACUTE                                

 

             2014           SINGER GOMEZ, SATNAM BABCOCK                        466.0

           

BRONCHITIS, ACUTE                                

 

             2014           HIMANSHU GOMEZ, DOROTEO BABCOCK                        466.0

           

BRONCHITIS, ACUTE                                

 

             2014           SINGER GOMEZ, SATNAM BABCOCK                        466.0

           

BRONCHITIS, ACUTE                                

 

             2014           SINGER GOMEZ, SATNAM BABCOCK                        466.0

           

BRONCHITIS, ACUTE                                

 

             2014           SINGER GOMEZ, SATNAM BABCOCK                        466.0

           

BRONCHITIS, ACUTE                                

 

             2014           BORGES DO, ILIR K                        690.11

           

SEBORRHEA CAPITIS                                

 

             2014           DOROTEO DOWNS MD                        690.1

1           

SEBORRHEA CAPITIS                                

 

             2014           DOROTEO DOWNS MD                        690.1

1           

SEBORRHEA CAPITIS                                

 

             2014           DOROTEO DOWNS MD                        690.1

1           

SEBORRHEA CAPITIS                                

 

             2014           DOROTEO DOWNS MD                        690.1

1           

SEBORRHEA CAPITIS                                

 

             2014           DOROTEO DOWNS MD                        690.1

1           

SEBORRHEA CAPITIS                                

 

             2014           SATNAM GALARZA MD                        690.1

1           

SEBORRHEA CAPITIS                                

 

             2014           DOROTEO DOWNS MD                        690.1

1           

SEBORRHEA CAPITIS                                

 

             2014           SATNAM GALARZA MD                        690.1

1           

SEBORRHEA CAPITIS                                

 

             2014           SATNAM GALARZA MD                        690.1

1           

SEBORRHEA CAPITIS                                

 

             2014           SATNAM GALARZA MD                        690.1

1           

SEBORRHEA CAPITIS                                

 

             2014           DOROTEO DOWNS MD                        295.7

0           P 

SCHIZO AFFECTIVE                                 

 

             2014           DOROTEO DOWNS MD                        V58.6

9           

HIGH RISK MEDICATION                             

 

             2014           DOROTEO DOWNS MD                        295.7

0           P 

SCHIZO AFFECTIVE                                 

 

             2014           DOROTEO DOWNS MD                        V58.6

9           

HIGH RISK MEDICATION                             

 

             2014           DOROTEO DOWNS MD                        295.7

0           P 

SCHIZO AFFECTIVE                                 

 

             2014           DOROTEO DOWNS MD                        V58.6

9           

HIGH RISK MEDICATION                             

 

             2014           DOROTEO DOWNS MD                        295.7

0           P 

SCHIZO AFFECTIVE                                 

 

             2014           DOROTEO DOWNS MD                        V58.6

9           

HIGH RISK MEDICATION                             

 

             2014           HIMANSHU GOMEZ, DOROTEO D                        295.7

0           P 

SCHIZO AFFECTIVE                                 

 

             2014           DOROTEO DOWNS MD                        V58.6

9           

HIGH RISK MEDICATION                             

 

             2014           SATNAM GALARZA MD D                        295.7

0           P 

SCHIZO AFFECTIVE                                 

 

             2014           SATNAM GALARZA MD D                        V58.6

9           

HIGH RISK MEDICATION                             

 

             2014           DOROTEO DOWNS MD D                        295.7

0           P 

SCHIZO AFFECTIVE                                 

 

             2014           DOROTEO DOWNS MD                        V58.6

9           

HIGH RISK MEDICATION                             

 

             2014           HOSSEIN GALARZA MDN D                        295.7

0           P 

SCHIZO AFFECTIVE                                 

 

             2014           SATNAM GALARZA MD D                        V58.6

9           

HIGH RISK MEDICATION                             

 

             2014           SATNAM GALARZA MD D                        295.7

0           P 

SCHIZO AFFECTIVE                                 

 

             2014           SATNAM GALARZA MD D                        V58.6

9           

HIGH RISK MEDICATION                             

 

             2014           SATNAM GALARZA MD D                        295.7

0           P 

SCHIZO AFFECTIVE                                 

 

             2014           SATNAM GALARZA MD D                        V58.6

9           

HIGH RISK MEDICATION                             

 

             2014           DOROTEO DOWNS MD                        250.0

0           

DIABETES MELLITUS TYPE 2 - UNCOMPLICATED, CONTROLLED                    

 

             2014           DOROTEO DOWNS MD                        788.4

1           

URINARY FREQUENCY                                

 

             2014           DOROTEO DOWNS MD                        250.0

0           

DIABETES MELLITUS TYPE 2 - UNCOMPLICATED, CONTROLLED                    

 

             2014           DOROTEO DOWNS MD                        788.4

1           

URINARY FREQUENCY                                

 

             2014           DOROTEO DOWNS MD                        250.0

0           

DIABETES MELLITUS TYPE 2 - UNCOMPLICATED, CONTROLLED                    

 

             2014           DOROTEO DOWNS MD                        788.4

1           

URINARY FREQUENCY                                

 

             2014           DOROTEO DOWNS MD                        250.0

0           

DIABETES MELLITUS TYPE 2 - UNCOMPLICATED, CONTROLLED                    

 

             2014           DOROTEO DOWNS MD                        788.4

1           

URINARY FREQUENCY                                

 

             2014           DOROTEO DOWNS MD                        250.0

0           

DIABETES MELLITUS TYPE 2 - UNCOMPLICATED, CONTROLLED                    

 

             2014           DOROTEO DOWNS MD                        788.4

1           

URINARY FREQUENCY                                

 

             2014           SATNAM GALARZA MD                        250.0

0           

DIABETES MELLITUS TYPE 2 - UNCOMPLICATED, CONTROLLED                    

 

             2014           SATNAM GALARZA MD                        788.4

1           

URINARY FREQUENCY                                

 

             2014           HIMANSHU GOMEZ, DOROTEO BABCOCK                        250.0

0           

DIABETES MELLITUS TYPE 2 - UNCOMPLICATED, CONTROLLED                    

 

             2014           HIMANSHU GOMEZ, DOROTEO BABCOCK                        788.4

1           

URINARY FREQUENCY                                

 

             2014           SATNAM GALARZA MD                        250.0

0           

DIABETES MELLITUS TYPE 2 - UNCOMPLICATED, CONTROLLED                    

 

             2014           SATNAM GALARZA MD                        788.4

1           

URINARY FREQUENCY                                

 

             2014           SATNAM GALARZA MD                        250.0

0           

DIABETES MELLITUS TYPE 2 - UNCOMPLICATED, CONTROLLED                    

 

             2014           SATNAM GALARZA MD                        788.4

1           

URINARY FREQUENCY                                

 

             2014           SATNAM GALARZA MD                        250.0

0           

DIABETES MELLITUS TYPE 2 - UNCOMPLICATED, CONTROLLED                    

 

             2014           SATNAM GALARZA MD                        788.4

1           

URINARY FREQUENCY                                

 

             10/02/2014           DOROTEO DOWNS MD                        530.8

1           

GERD                                             

 

             10/02/2014           DOROTEO DOWNS MD                        530.8

1           

GERD                                             

 

             10/02/2014           DOROTEO DOWNS MD                        530.8

1           

GERD                                             

 

             10/02/2014           DOROTEO DOWNS MD                        530.8

1           

GERD                                             

 

             10/02/2014           SATNMA GALARZA MD                        530.8

1           

GERD                                             

 

             10/02/2014           DOROTEO DOWNS MD                        530.8

1           

GERD                                             

 

             10/02/2014           SATNAM GALARZA MD                        530.8

1           

GERD                                             

 

             10/02/2014           SATNAM GALARZA MD                        530.8

1           

GERD                                             

 

             10/02/2014           SATNAM GALARZA MD                        530.8

1           

GERD                                             

 

             2014           DOROTEO DOWNS MD                        401.1

           

HYPERTENSION, BENIGN ESSENTIAL                    

 

             2014           SATNAM GALARZA MD                        401.1

           

HYPERTENSION, BENIGN ESSENTIAL                    

 

             2014           DOROTEO DOWNS MD                        401.1

           

HYPERTENSION, BENIGN ESSENTIAL                    

 

             2014           SATNAM GALARZA MD                        401.1

           

HYPERTENSION, BENIGN ESSENTIAL                    

 

             2014           SATNAM GALARZA MD                        401.1

           

HYPERTENSION, BENIGN ESSENTIAL                    

 

             2014           SATNAM GALARZA MD                        401.1

           

HYPERTENSION, BENIGN ESSENTIAL                    

 

             2015           SATNAM GALARZA MD                        684  

         

IMPETIGO                                         

 

             2015           SATNAM GALARZA MD                        698.9

           

PRURITUS NOS                                     

 

             2015           DOROTEO DOWNS MD                        684  

         

IMPETIGO                                         

 

             2015           DOROTEO DOWNS MD                        698.9

           

PRURITUS NOS                                     

 

             2015           SATNAM GALARZA MD                        684  

         

IMPETIGO                                         

 

             2015           SATNAM GALARZA MD                        698.9

           

PRURITUS NOS                                     

 

             2015           SATNAM GALARZA MD                        684  

         

IMPETIGO                                         

 

             2015           SATNAM GALARZA MD                        698.9

           

PRURITUS NOS                                     

 

             2015           SATNAM GALARZA MD                        684  

         

IMPETIGO                                         

 

             2015           SATNAM GALARZA MD                        698.9

           

PRURITUS NOS                                     

 

             2015           DOROTEO DOWNS MD                        782.9

           

OTHER SYMPTOMS INVOLVING SKIN AND INTEGUMENTARY TISSUES                    

 

             2015           SATNAM GALARZA MD                        782.9

           

OTHER SYMPTOMS INVOLVING SKIN AND INTEGUMENTARY TISSUES                    

 

             2015           SATNAM GALARZA MD                        782.9

           

OTHER SYMPTOMS INVOLVING SKIN AND INTEGUMENTARY TISSUES                    

 

             2015           SATNAM GALARZA MD                        782.9

           

OTHER SYMPTOMS INVOLVING SKIN AND INTEGUMENTARY TISSUES                    

 

             2015           DOROTEO DOWNS MD                        724.2

           

BACK PAIN, LOWER                                 

 

             2015           SATNAM GALARZA MD                        724.2

           

BACK PAIN, LOWER                                 

 

             2015           SATNAM GALARZA MD                        724.2

           

BACK PAIN, LOWER                                 

 

             2015           SATNAM GALARZA MD                        724.2

           

BACK PAIN, LOWER                                 

 

             2015           DOROTEO DOWNS MD                        564.0

0           

CONSTIPATION                                     

 

             2015           DOROTEO DOWNS MD                        787.0

1           

NAUSEA WITH VOMITING                             

 

             2015           DOROTEO DOWNS MD                        787.3

           

FLATULENCE ERUCTATION AND GAS PAIN                    

 

             2015           SATNAM GALARZA MD                        564.0

0           

CONSTIPATION                                     

 

             2015           SATNAM GALARZA MD                        787.0

1           

NAUSEA WITH VOMITING                             

 

             2015           SATNAM GALARZA MD                        787.3

           

FLATULENCE ERUCTATION AND GAS PAIN                    

 

             2015           SATNAM GALARZA MD                        564.0

0           

CONSTIPATION                                     

 

             2015           SATNAM GALARZA MD                        787.0

1           

NAUSEA WITH VOMITING                             

 

             2015           SATNAM GALARZA MD                        787.3

           

FLATULENCE ERUCTATION AND GAS PAIN                    

 

             2015           SATNAM GALARZA MD                        564.0

0           

CONSTIPATION                                     

 

             2015           SATNAM GALARZA MD                        787.0

1           

NAUSEA WITH VOMITING                             

 

             2015           SATNAM GALARZA MD                        787.3

           

FLATULENCE ERUCTATION AND GAS PAIN                    

 

             2015           DOROTEO DOWNS MD                        789.0

7           

ABDOMINAL PAIN GENERALIZED                       

 

             2015           SATNAM GALARZA MD                        789.0

7           

ABDOMINAL PAIN GENERALIZED                       

 

             2015           SATNAM GALARZA MD                        789.0

7           

ABDOMINAL PAIN GENERALIZED                       

 

             2015           SATNAM GALARZA MD                        789.0

7           

ABDOMINAL PAIN GENERALIZED                       

 

             2015           SATNAM GALARZA MD                        216.9

           

BENIGN NEOPLASM OF SKIN SITE UNSPECIFIED                    

 

             2015           SATNAM GALARZA MD                        216.9

           

BENIGN NEOPLASM OF SKIN SITE UNSPECIFIED                    

 

             2015           SATNAM GALARZA MD                        216.9

           

BENIGN NEOPLASM OF SKIN SITE UNSPECIFIED                    

 

             04/15/2015           SATNAM GALARZA MD                        238.2

           

NEOPLASM OF UNCERTAIN BEHAVIOR OF SKIN                    

 

             04/15/2015           SATNAM GALARZA MD                        238.2

           

NEOPLASM OF UNCERTAIN BEHAVIOR OF SKIN                    

 

             2015           SATNAM GALARZA MD                        V58.3

2           

ENCOUNTER FOR REMOVAL OF SUTURES                    

 

             05/10/2018           Master GOMEZ, Lamar                        Z68.41 

          Body

Mass Index 40.0-44.9 Adult                       

 

                10/14/2019           ALIYAH CRANE           Final          

 M25.411        

                          Effusion, right shoulder                    

 

                10/14/2019           ALIYAH CRANE           Reason For Visi

t           

S46.011A                                Strain of muscle(s) and tendon(s) of the

 rotator cuff of 

right shoulder, initial encounter                    

 

             10/28/2019           KANE MORAES           Final           M12.5

11           

Traumatic arthropathy, right shoulder                    

 

                10/28/2019           KANE MORAES           Reason For Visit  

         

S46.011A                                Strain of muscle(s) and tendon(s) of the

 rotator cuff of 

right shoulder, initial encounter                    

 

                10/28/2019           KANE MORAES           Final           W1

9.XXXA          

                          Unspecified fall, initial encounter                   

 

 

             10/28/2019           KANE MORAES           Final           Z99.8

1           

Dependence on supplemental oxygen                    

 

                2020           KANE MORAES           Reason For Visit  

         M19.011

                          Primary osteoarthritis, right shoulder                

    



                                                                                
                                                                                
                                                                                
                                                   



Procedures

      



                Code            Description           Performed By           Per

formed On        

 

                                                                       FLU 

ADMINISTRATION (MEDICARE 

ONLY)                                               2013        

 

                                      [id number]                                 ROUT

INE VENIPUNCTURE          

                                                    2014        

 

                                      [id number]                                 GF

R CALC (RESULT ONLY)      

                                                    2014        

 

                                90287                                 CMP       

                    

                                        2014        

 

                                      92794                                 LIPI

D PANEL                   

                                                    2014        

 

                                37661                                 CBC       

                    

                                        2014        

 

                                27906                                 A1C (RML) 

                    

                                        2014        

 

                                      30290                                 UA L

ALDO DIP                   

                                                    2014        

 

                                      25033                                 CULT

URE URINE                 

                                                    2014        

 

                                      22976                                 A1C 

(IN-HOUSE)                

                                                    2014        

 

                                      96193                                 ROUT

INE VENIPUNCTURE          

                                                    2014        

 

                                43592                                 CBC       

                    

                                        2014        

 

                                      [id number]                                 GF

R CALC (RESULT ONLY)      

                                                    2014        

 

                                73749                                 CMP       

                    

                                        2014        

 

                                      49255                                 LIPI

D PANEL                   

                                                    2014        

 

                                      91815                                 XRAY

 ABDOMEN, 1 VIEW (KUB)    

                                                    2015        

 

                                      13809                                 EXCI

ROSEANN BENIGN LESION 1.1-2 

CM (SPECIFY LOCATION IN [facility])                                    

           

2015        



                                                  



Results

      



                    Test                Result              Range        

 

                                        LIPID PANEL - 17 09:49         

 

                    Cholesterol, Total           171 mg/dL           100-199    

    

 

                    Triglycerides           151 mg/dL           0-149        

 

                    HDL Cholesterol           46 mg/dL            >39        

 

                    VLDL Cholesterol Jhon           30 mg/dL            5-40     

   

 

                    LDL Cholesterol Calc           95 mg/dL            0-99     

   

 

                    Comment:                                NRG        

 

                                        [facility] - 17 09:49         

 

                    Glucose, Serum           120 mg/dL           65-99        

 

                    BUN                 21 mg/dL            8-27        

 

                    Creatinine, Serum           0.90 mg/dL           0.57-1.00  

      

 

                    eGFR If NonAfricn Am           67 mL/min/1.73               

>59        

 

                    eGFR If Africn Am           78 mL/min/1.73               >59

        

 

                    BUN/Creatinine Ratio           23                  12-28    

    

 

                    Sodium, Serum           139 mmol/L           134-144        

 

                    Potassium, Serum           5.3 mmol/L           3.5-5.2     

   

 

                    Chloride, Serum           99 mmol/L                   

 

                    Carbon Dioxide, Total           24 mmol/L           18-29   

     

 

                    Calcium, Serum           9.3 mg/dL           8.7-10.3       

 

 

                    Protein, Total, Serum           7.1 g/dL            6.0-8.5 

       

 

                    Albumin, Serum           4.1 g/dL            3.6-4.8        

 

                    Globulin, Total           3.0 g/dL            1.5-4.5       

 

 

                    A/G Ratio           1.4                 1.2-2.2        

 

                    Bilirubin, Total           0.5 mg/dL           0.0-1.2      

  

 

                    Alkaline Phosphatase, S           112 IU/L            

        

 

                    AST (SGOT)           25 IU/L             0-40        

 

                    ALT (SGPT)           26 IU/L             0-32        

 

                                        [facility] - 18 15:32         

 

                    GLUCOSE             218 mg/dL           65-99        

 

                    UREA NITROGEN (BUN)           20 mg/dL            7-25      

  

 

                    CREATININE           0.85 mg/dL           0.50-0.99        

 

                    eGFR NON-AFR. AMERICAN           72 mL/min/1.73m2           

> OR = 60        

 

                    eGFR            83 mL/min/1.73m2           >

 OR = 60        

 

                    BUN/CREATININE RATIO           NOT APPLICABLE (calc)        

   6-22        

 

                    SODIUM              140 mmol/L           135-146        

 

                    POTASSIUM           4.9 mmol/L           3.5-5.3        

 

                    CHLORIDE            105 mmol/L                   

 

                    CARBON DIOXIDE           26 mmol/L           20-31        

 

                    CALCIUM             9.1 mg/dL           8.6-10.4        

 

                    PROTEIN, TOTAL           6.7 g/dL            6.1-8.1        

 

                    ALBUMIN             3.8 g/dL            3.6-5.1        

 

                    GLOBULIN            2.9 g/dL (calc)           1.9-3.7       

 

 

                    ALBUMIN/GLOBULIN RATIO           1.3 (calc)           1.0-2.

5        

 

                    BILIRUBIN, TOTAL           0.5 mg/dL           0.2-1.2      

  

 

                    ALKALINE PHOSPHATASE           118 U/L                

     

 

                    AST                 15 U/L              10-35        

 

                    ALT                 13 U/L              6-29        

 

                                        A1C - 18 15:32         

 

                    HEMOGLOBIN A1c           7.2 % of total Hgb           <5.7  

      

 

                                        [facility] - 10/22/18 09:24         

 

                    GLUCOSE             154 mg/dL           65-99        

 

                    UREA NITROGEN (BUN)           28 mg/dL            7-25      

  

 

                    CREATININE           0.83 mg/dL           0.50-0.99        

 

                    eGFR NON-AFR. AMERICAN           73 mL/min/1.73m2           

> OR = 60        

 

                    eGFR            85 mL/min/1.73m2           >

 OR = 60        

 

                    BUN/CREATININE RATIO           34 (calc)           6-22     

   

 

                    SODIUM              140 mmol/L           135-146        

 

                    POTASSIUM           4.3 mmol/L           3.5-5.3        

 

                    CHLORIDE            106 mmol/L                   

 

                    CARBON DIOXIDE           26 mmol/L           20-32        

 

                    CALCIUM             9.2 mg/dL           8.6-10.4        

 

                    PROTEIN, TOTAL           6.8 g/dL            6.1-8.1        

 

                    ALBUMIN             4.0 g/dL            3.6-5.1        

 

                    GLOBULIN            2.8 g/dL (calc)           1.9-3.7       

 

 

                    ALBUMIN/GLOBULIN RATIO           1.4 (calc)           1.0-2.

5        

 

                    BILIRUBIN, TOTAL           0.5 mg/dL           0.2-1.2      

  

 

                    ALKALINE PHOSPHATASE           92 U/L                 

     

 

                    AST                 17 U/L              10-35        

 

                    ALT                 12 U/L              6-29        

 

                                        CULTURE, ANAEROBIC AND AEROBIC - 

8 13:38         

 

                    CULTURE, ANAEROBIC BACTERIA W/GRAM STAIN           SEE NOTE 

           NRG      

 

 

                    CULTURE, AEROBIC BACTERIA           SEE NOTE            NRG 

       

 

                                        CMP - 19 15:10         

 

                    GLUCOSE             112 mg/dL                   

 

                    UREA NITROGEN (BUN)           26 mg/dL            7-25      

  

 

                    CREATININE           0.87 mg/dL           0.50-0.99        

 

                    eGFR NON-AFR. AMERICAN           69 mL/min/1.73m2           

> OR = 60        

 

                    eGFR            80 mL/min/1.73m2           >

 OR = 60        

 

                    BUN/CREATININE RATIO           30 (calc)           6-22     

   

 

                    SODIUM              140 mmol/L           135-146        

 

                    POTASSIUM           4.8 mmol/L           3.5-5.3        

 

                    CHLORIDE            102 mmol/L                   

 

                    CARBON DIOXIDE           30 mmol/L           20-32        

 

                    CALCIUM             9.5 mg/dL           8.6-10.4        

 

                    PROTEIN, TOTAL           6.5 g/dL            6.1-8.1        

 

                    ALBUMIN             4.1 g/dL            3.6-5.1        

 

                    GLOBULIN            2.4 g/dL (calc)           1.9-3.7       

 

 

                    ALBUMIN/GLOBULIN RATIO           1.7 (calc)           1.0-2.

5        

 

                    BILIRUBIN, TOTAL           0.6 mg/dL           0.2-1.2      

  

 

                    ALKALINE PHOSPHATASE           93 U/L                 

     

 

                    AST                 10 U/L              10-35        

 

                    ALT                 12 U/L              6-29        

 

                                        CBC w/MANUAL DIFF - 19 15:10      

   

 

                    WHITE BLOOD CELL COUNT           9.4 Thousand/uL           3

.8-10.8        

 

                    RED BLOOD CELL COUNT           4.39 Million/uL           3.8

0-5.10        

 

                    HEMOGLOBIN           12.5 g/dL           11.7-15.5        

 

                    HEMATOCRIT           38.2 %              35.0-45.0        

 

                    MCV                 87.0 fL             80.0-100.0        

 

                    MCH                 28.5 pg             27.0-33.0        

 

                    MCHC                32.7 g/dL           32.0-36.0        

 

                    RDW                 12.6 %              11.0-15.0        

 

                    PLATELET COUNT           332 Thousand/uL           140-400  

      

 

                    MPV                 10.6 fL             7.5-12.5        

 

                    ABSOLUTE NEUTROPHILS           6204 cells/uL           1500-

7800        

 

                    ABSOLUTE MONOCYTES           846 cells/uL           200-950 

       

 

                    ABSOLUTE EOSINOPHILS           282 cells/uL           

        

 

                    ABSOLUTE BASOPHILS           188 cells/uL           0-200   

     

 

                    NEUTROPHILS           66.0 %              NRG        

 

                    LYMPHOCYTES           20.0 %              NRG        

 

                    MONOCYTES           9.0 %               NRG        

 

                    EOSINOPHILS           3.0 %               NRG        

 

                    BASOPHILS           2.0 %               NRG        

 

                    ABSOLUTE LYMPHOCYTES           1880 cells/uL           850-3

900        

 

                    PLATELET ESTIMATION           ADEQUATE            ADEQUATE  

      

 

                    CBC MORPHOLOGY                               NORMAL        

 

                                        THYROID ANTIBODIES - 19 09:36     

    

 

                    THYROID PEROXIDASE ANTIBODIES           <1 IU/mL            

<9        

 

                    THYROGLOBULIN ANTIBODIES           <1 IU/mL            < or 

= 1        

 

                                        HOLD SPECIMEN FOR BLOOD BANK - 19 

14:30         

 

                    HOLD SPECIMEN FOR BLOOD BANK                       [facility]      

                    

 

                                        CBC NO DIFF (HEMOGRAM) - 19 14:30 

        

 

                    WBC - WHITE CELL COUNT                             8.1 X10(3

) uL           4.5-

11.0        

 

                    RBC - RED CELL COUNT                               4.23 X10(

6) uL           

4.20-5.40        

 

                    PLATELET COUNT                                     331 X10(3

) uL           150-

450        

 

                    HEMOGLOBIN                                         11.6 g/dl

           12.0-16.0

       

 

                    HEMATOCRIT                                         37.9 %   

           38.0-47.0   

    

 

                    MCV                                                89.6 fL  

           80.0-96.0  

     

 

                    MCH                                                27 pg    

           27-31        

 

                    MCHC                                               30.6 %   

           32.0-36.0   

    

 

                                        CMP - COMPREHENSIVE METABOLIC PANEL -  14:30         

 

                    GLUCOSE                                            140 mg/dl

              

    

 

                    BUN                                                15 mg/dl 

           7-18      

 

 

                    CREATININE                                         0.90 mg/d

l           0.55-

1.02        

 

                    SODIUM (NA)                                        138 mEq/L

           136-146  

     

 

                    POTASSIUM, BLOOD                                   4.0 mEq/L

           3.5-5.1  

     

 

                    CHLORIDE                                           100 mEq/L

              

    

 

                    CO2 (BICARBONATE)                                  31 mEq/L 

           21-32     

  

 

                    CALCIUM                                            9.7 mg/dl

           8.5-10.1 

      

 

                    ALBUMIN, SERUM                                     3.6 g/dl 

           3.4-5.0   

    

 

                    PROTEIN, TOTAL                                     7.9 g/dl 

           6.4-8.2   

    

 

                    AST (SGOT)                                         14 U/L   

           15-37       



 

                    ALT (SGPT)                                         17 U/L   

           14-59       



 

                    ALK PHOS                                           119 U/L  

                

  

 

                    BILIRUBIN, TOTAL                                   0.6 mg/dl

           0.2-1.0  

     

 

                    eGFR                 mL/min             >=59        

 

                                        LIPID PANEL - 19 14:30         

 

                    CHOLESTEROL                                        107 mg/dl

           <=199    

   

 

                    HDL CHOLESTEROL                                    49 mg/dl 

           40-60     

  

 

                    TRIGLYCERIDES                                      70 mg/dl 

           <=200     

  

 

                    LDL, CALCULATED                                    44.0 mg/d

l           0.0-99.0

       

 

                    VLDL, CALCULATED                                   14.0 mg/d

l           0.0-

130.0        

 

                    CARDIAC RISK                                       2        

                    

 

                                        MAGNESIUM - 19 14:30         

 

                    MAGNESIUM                                          1.9 mg/dl

           1.8-2.4  

     

 

                                        TROPONIN-I - 19 14:30         

 

                    TROPONIN-I                                         <0.02 ng/

ml           <=0.05 

      

 

                                        A1C - 19 14:30         

 

                    HEMOGLOBIN A1C                                     7.4 %    

           4.5-6.2      

 

 

                                        URINALYSIS, DIPSTICK ONLY - 19 14:

57         

 

                    COLOR                                              YELLOW   

           STRAW      

 

 

                    CLARITY/APPEARANCE                                 CLEAR    

           CLEAR       



 

                          UROBILINOGEN (URINALYSIS)                          0.2

 E.U./dL E.U./dL          

                                        NORMAL        

 

                    SP GRAV             1.015               1.005-1.025        

 

                    PH                  7.0                 5.0-8.0        

 

                    LEUKO               NEG qualitative           NEG        

 

                    NITRITE             NEG                 NEG        

 

                    PROTEIN             NEG mg/dL           NEG.        

 

                    GLUCOSE             NEG mg/dL           NEG        

 

                    KETONES             NEG mg/dL           NEG        

 

                    BILIRUBIN           NEG qualitative           NEG        

 

                    BLOOD               NEGATIVE qualitative           NEGATIVE 

       

 

                                        GLUCOSE, ACCUCHEK - 19 20:19      

   

 

                    GLUCOSE (ACCUCHEK)                                 149 mg/dl

              

    

 

                                        MAGNESIUM - 19 04:48         

 

                    MAGNESIUM                                          1.9 mg/dl

           1.8-2.4  

     

 

                                        BMP - BASIC METABOLIC PANEL - 19 0

4:48         

 

                    GLUCOSE                                            141 mg/dl

              

    

 

                    BUN                                                23 mg/dl 

           7-18      

 

 

                    CREATININE                                         1.35 mg/d

l           0.55-

1.02        

 

                    SODIUM (NA)                                        140 mEq/L

           136-146  

     

 

                    POTASSIUM, BLOOD                                   3.8 mEq/L

           3.5-5.1  

     

 

                    CHLORIDE                                           102 mEq/L

              

    

 

                    CO2 (BICARBONATE)                                  34 mEq/L 

           21-32     

  

 

                    CALCIUM                                            8.9 mg/dl

           8.5-10.1 

      

 

                    eGFR                 mL/min             >=59        

 

                                        GLUCOSE, ACCUCHEK - 19 11:39      

   

 

                    GLUCOSE (ACCUCHEK)                                 140 mg/dl

              

    

 

                                        GLUCOSE, ACCUCHEK - 19 15:30      

   

 

                    GLUCOSE (ACCUCHEK)                                 161 mg/dl

              

    

 

                                        GLUCOSE, ACCUCHEK - 19 20:57      

   

 

                    GLUCOSE (ACCUCHEK)                                 226 mg/dl

              

    

 

                                        BMP - BASIC METABOLIC PANEL - 19 0

2:55         

 

                    GLUCOSE                                            154 mg/dl

              

    

 

                    BUN                                                32 mg/dl 

           7-18      

 

 

                    CREATININE                                         1.29 mg/d

l           0.55-

1.02        

 

                    SODIUM (NA)                                        137 mEq/L

           136-146  

     

 

                    POTASSIUM, BLOOD                                   4.5 mEq/L

           3.5-5.1  

     

 

                    CHLORIDE                                           100 mEq/L

              

    

 

                    CO2 (BICARBONATE)                                  27 mEq/L 

           21-32     

  

 

                    CALCIUM                                            8.7 mg/dl

           8.5-10.1 

      

 

                    eGFR                 mL/min             >=59        

 

                                        CBC NO DIFF (HEMOGRAM) - 19 02:55 

        

 

                    WBC - WHITE CELL COUNT                             8.8 X10(3

) uL           4.5-

11.0        

 

                    RBC - RED CELL COUNT                               4.23 X10(

6) uL           

4.20-5.40        

 

                    PLATELET COUNT                                     374 X10(3

) uL           150-

450        

 

                    HEMOGLOBIN                                         11.5 g/dl

           12.0-16.0

       

 

                    HEMATOCRIT                                         38.0 %   

           38.0-47.0   

    

 

                    MCV                                                89.8 fL  

           80.0-96.0  

     

 

                    MCH                                                27 pg    

           27-31        

 

                    MCHC                                               30.3 %   

           32.0-36.0   

    

 

                                        [facility] - 19 10:50         

 

                    GLUCOSE             163 mg/dL                   

 

                    UREA NITROGEN (BUN)           30 mg/dL            7-25      

  

 

                    CREATININE           0.93 mg/dL           0.50-0.99        

 

                    eGFR NON-AFR. AMERICAN           64 mL/min/1.73m2           

> OR = 60        

 

                    eGFR            74 mL/min/1.73m2           >

 OR = 60        

 

                    BUN/CREATININE RATIO           32 (calc)           6-22     

   

 

                    SODIUM              134 mmol/L           135-146        

 

                    POTASSIUM           5.4 mmol/L           3.5-5.3        

 

                    CHLORIDE            101 mmol/L                   

 

                    CARBON DIOXIDE           26 mmol/L           20-32        

 

                    CALCIUM             9.7 mg/dL           8.6-10.4        

 

                                        PDM -  PANEL (PROFILE 1) - 19 15

:46         

 

                    Creatinine           127.3 mg/dL           > or = 20.0      

  

 

                    pH                  5.2                 4.5-9.0        

 

                    Oxidant             NEGATIVE mcg/mL           <200        

 

                    Amphetamines           NEGATIVE ng/mL           <500        

 

                    medMATCH Amphetamines           CONSISTENT            NRG   

     

 

                    Benzodiazepines           NEGATIVE ng/mL           <100     

   

 

                    medMATCH Benzodiazepines           CONSISTENT            NRG

        

 

                    Marijuana Metabolite           NEGATIVE ng/mL           <20 

       

 

                    medMATCH Marijuana Metab           CONSISTENT            NRG

        

 

                    Cocaine Metabolite           NEGATIVE ng/mL           <150  

      

 

                    medMATCH Cocaine Metab           CONSISTENT            NRG  

      

 

                    Opiates             POSITIVE ng/mL           <100        

 

                    Oxycodone           NEGATIVE ng/mL           <100        

 

                    medMATCH Oxycodone           CONSISTENT            NRG      

  

 

                    COMMENT                                 NRG        

 

                      Codeine           NEGATIVE ng/mL           <50        

 

                    medMATCH Codeine           CONSISTENT            NRG        

 

                      Hydrocodone           1395 ng/mL           <50        

 

                    medMATCH Hydrocodone           INCONSISTENT            NRG  

      

 

                      Hydromorphone           NEGATIVE ng/mL           <50      

  

 

                    medMATCH Hydromorphone           CONSISTENT            NRG  

      

 

                      Morphine           NEGATIVE ng/mL           <50        

 

                    medMATCH Morphine           CONSISTENT            NRG       

 

 

                      Norhydrocodone           807 ng/mL           <50        

 

                    medMATCH Norhydrocodone           See Note            NRG   

     

 

                    Barbiturates           NEGATIVE ng/mL           <300        

 

                    medMATCH Barbiturates           CONSISTENT            NRG   

     

 

                    Methadone Metabolite           NEGATIVE ng/mL           <100

        

 

                    medMATCH Methadone Metab           CONSISTENT            NRG

        

 

                    Phencyclidine           NEGATIVE ng/mL           <25        

 

                    medMATCH Phencyclidine           CONSISTENT            NRG  

      

 

                                        BMP - 10/08/19 14:04         

 

                    GLUCOSE             82 mg/dL                    

 

                    UREA NITROGEN (BUN)           31 mg/dL            7-25      

  

 

                    CREATININE           0.99 mg/dL           0.50-0.99        

 

                    eGFR NON-AFR. AMERICAN           59 mL/min/1.73m2           

> OR = 60        

 

                    eGFR            68 mL/min/1.73m2           >

 OR = 60        

 

                    BUN/CREATININE RATIO           31 (calc)           6-22     

   

 

                    SODIUM              139 mmol/L           135-146        

 

                    POTASSIUM           4.8 mmol/L           3.5-5.3        

 

                    CHLORIDE            103 mmol/L                   

 

                    CARBON DIOXIDE           26 mmol/L           20-32        

 

                    CALCIUM             9.6 mg/dL           8.6-10.4        

 

                                        MAGNESIUM SERUM - 10/08/19 14:04        

 

 

                    MAGNESIUM           1.7 mg/dL           1.5-2.5        

 

                                        TSH - 10/08/19 14:04         

 

                    TSH                 2.54 mIU/L           0.40-4.50        

 

                                        CULTURE, URINE - 20 13:56         

 

                    CULTURE, URINE, ROUTINE           SEE NOTE            NRG   

     



                                                                            



Encounters

      



                ACCT No.           Visit Date/Time           Discharge          

 Status         

             Pt. Type           Provider           Facility           Loc./Unit 

          

Complaint        

 

                [id number]           2019 14:09:00           2019 11:24:

00           DIS

                    Inpatient           SHIREEN ISRAEL           [facility]

ospital   

                          [address]                       DYSPNEA, CRESCENDO ANGINA   

     

 

                    [id number]           2020 09:27:14           

0 23:59:59          

                DIS             Outpatient           KANE MORAES           [facility]                    

 

                    [id number]           10/28/2019 08:40:51           10/28/201

9 23:59:59          

                DIS             Outpatient           KANE MORAES           [facility]                    

 

                    [id number]           10/14/2019 09:34:46           10/14/201

9 23:59:59          

                DIS             Outpatient           ALIYAH CRANE EVANGELISTA          

            [facility]                            

 

                    [id number]           2019 09:58:58           

9 23:59:59          

                DIS             Outpatient           ALIYAH CRANE EVANGELISTA          

            [facility]                            

 

                    [id number]           05/15/2018 13:58:21           05/15/201

8 23:59:59          

                CLS             Preadmit           LAMAR THAO                       [facility]               [facility]        

 

             [id number]           2018 08:55:27                           

          

Document Registration                                                           

         

 

                [id number]           2020 14:00:00           2020 23:59:

59           CLS

                Outpatient           ALIYAH CRANE EDUARA

[facility]                                     

 

             [id number]           2020 13:40:00                              

       Document

Registration                                                                    

 

             [id number]           10/08/2019 13:20:00                              

       Document

Registration                                                                    

 

             [id number]           2019 13:40:00                              

       Document

Registration                                                                    

 

             [id number]           2019 11:00:00                              

       Document

Registration                                                                    

 

             [id number]           2019 10:40:00                              

       Document

Registration                                                                    

 

             [id number]           2019 14:20:00                              

       Document

Registration                                                                    

 

             [id number]           2018 10:00:00                              

       Document

Registration                                                                    

 

             [id number]           10/22/2018 09:00:00                              

       Document

Registration                                                                    

 

             [id number]           2018 15:00:00                              

       Document

Registration                                                                    

 

             [id number]           2017 08:20:00                              

       Document

Registration                                                                    

 

             [id number]           2019 14:50:02                        [facility]    

       Unknown

                Lamar Thao MD                                                 

   

 

                [id number]           2015 08:37:00           2015 23:59:

59           CLS

                Outpatient           SATNAM GALARZA MD                          

          

                                                 

 

                [id number]           04/15/2015 10:21:00           04/15/2015 23:59:

59           CLS

                Outpatient           SATNAM GALARZA MD                          

          

                                                 

 

                [id number]           2015 09:59:00           2015 23:59:

59           CLS

                Outpatient           SATNAM GALARZA MD                          

          

                                                 

 

                [id number]           2015 13:56:00           2015 23:59:

59           CLS

                Outpatient           DOROTEO DOWNS MD                          

          

                                                 

 

                [id number]           2015 10:33:00           2015 23:59:

59           CLS

                Outpatient           SATNAM GALARZA MD                          

          

                                                 

 

                [id number]           2014 10:14:00           2014 23:59:

59           CLS

                Outpatient           DOROTEO DOWNS MD                          

          

                                                 

 

                [id number]           10/30/2014 16:34:00           10/30/2014 23:59:

59           CLS

                Outpatient           DOROTEO DOWNS MD                          

          

                                                 

 

                [id number]           10/08/2014 15:33:00           10/08/2014 23:59:

59           CLS

                Outpatient           DOROTEO DOWNS MD                          

          

                                                 

 

                [id number]           10/02/2014 15:16:00           10/02/2014 23:59:

59           CLS

                Outpatient           DOROTEO DOWNS MD                          

          

                                                 

 

                [id number]           2014 16:01:00           2014 23:59:

59           CLS

                Outpatient           DOROTEO DOWNS MD                          

          

                                                 

 

                [id number]           2014 14:36:00           2014 23:59:

59           CLS

                Outpatient           ILIR BORGES DO                           

          

                                                 

 

                [id number]           2014 09:22:00           2014 23:59:

59           CLS

                Outpatient           SATNAM GALARZA MD                          

          

                                                 

 

                [id number]           2013 10:17:00           2013 23:59:

59           CLS

                Outpatient           DOROTEO DOWNS MD                          

          

                                                 

 

             [id number]           2019 22:26:48                        [facility]   

        

Document Registration

## 2020-04-10 NOTE — ED FALL/INJURY
General


Chief Complaint:  Trauma-Non Activation


Stated Complaint:  PT FELL AND HIT HID, LEFT ARM IN PAIN


Nursing Triage Note:  


PATIENT TRIPPED AND FELL FACE FIRST, DENIES LOC DENIES HEAD PAIN. ETOH AND HYDRO




PTA


Source:  patient (VERY LIMITED HISTORIAN--PT IS INTOXICATED--ETOH + HYDROCODONE)


Exam Limitations:  intoxication





History of Present Illness


Date Seen by Provider:  Apr 10, 2020


Time Seen by Provider:  22:22


Initial Comments


PT ARRIVES VIA POV--STATES HER SON AND HIS S.O. DROVE HER HERE


STATES SHE "THINKS SHE TRIPPED" AND FELL FACE FIRST ONTO PORCH


DENIES LOSS OF CONSCIOUSNESS


DENIES NECK PAIN 


C/O PAIN TO LEFT BROW AREA AND PAIN TO LEFT SHOULDER


PT DENIES PAIN ANYWHERE ELSE


STATES SHE HAS "BEEN PARTYING TONIGHT" FOR DAUGHTER'S BIRTHDAY--WITH MULTIPLE 

FAMILY MEMBERS PRESENT --DESPITE STATE-MANDATED STAY AT HOME ORDERS


STATES SHE HAS HAD "6 BEERS" TONIGHT, AND ALSO TOOK HYDROCODONE TONIGHT--STATES 

SHE TAKES IT FOR ARTHRITIS





PT DOES NOT KNOW ANY OF HER MEDICATIONS OR WHAT SHE TAKES THEM FOR, AND DOES NOT

KNOW IF SHE IS ON A BLOOD THINNER OR NOT. 





PT ABLE TO STAND/BEAR WEIGHT AND TRANSFER WITHOUT PAIN IN LEGS/HIPS


Location Injury Occurred:  LEFT SHOULDER





PCP: [facility] DR. CRESPO


CARDIOLOGIST: DR. PANCHAL





Allergies and Home Medications


Allergies


Coded Allergies:  


     No Known Drug Allergies (Unverified , 9/29/16)





Home Medications


Aripiprazole 15 Mg Tablet, 15 MG PO DAILY, (Reported)


Dexlansoprazole 60 Mg Cap., 60 MG PO DAILY, (Reported)


Enalapril Maleate 10 Mg Tablet, 10 MG PO DAILY, (Reported)


Fluoxetine HCl 20 Mg Capsule, 20 MG PO DAILY, (Reported)


Furosemide 40 Mg Tablet, 40 MG PO DAILY, (Reported)


Hydrocodone/Acetaminophen 1 Each Tablet, 1 TAB PO Q6H


   Prescribed by: PETER POTTER on 10/22/19 1716


Meloxicam 15 Mg Tablet, 15 MG PO DAILY, (Reported)


Metformin HCl 500 Mg Tablet, 500 MG PO BID, (Reported)


Potassium Chloride 10 Meq Tablet.er, 10 MEQ PO DAILY, (Reported)


Tiotropium Bromide 1 Inh Aerp, 2 INH IH DAILY, (Reported)





Patient Home Medication List


Home Medication List Reviewed:  Yes





Review of Systems


Review of Systems


Constitutional:  No dizziness


Eyes:  Denies Vision Changes; Other (LEFT PERIORBITAL HEMATOMA)


Ears, Nose, Mouth, Throat:  no symptoms reported


Respiratory:  no symptoms reported; No short of breath


Cardiovascular:  no symptoms reported; No chest pain


Gastrointestinal:  no symptoms reported; No abdominal pain, No nausea, No 

vomiting


Genitourinary:  no symptoms reported


Musculoskeletal:  see HPI; No back pain, No neck pain; other (LEFT SHOULDER PAIN

)


Skin:  other (HAS "SORES" ON ARMS, FACE AND UPPER BACK)


Psychiatric/Neurological:  See HPI (INTOXICATED. ); Denies Headache, Denies 

Numbness, Denies Paresthesia, Denies Tingling, Denies Weakness





Past Medical-Social-Family Hx


Past Med/Social Hx:  Reviewed and Corrections made


Patient Social History


Alcohol Use:  Regular Use (DRINKS DAILY)


Alcohol Beverage of Choice:  Beer


Recreational Drug Use:  No


Smoking Status:  Never a Smoker (CHEWS TOBACCO DAILY)


Type Used:  Smokeless Tobacco


Former Smoker, Quit:  Jan 1, 2006


2nd Hand Smoke Exposure:  Yes


Recent Foreign Travel:  No


Contact w/Someone Who Travel:  No


Recent Infectious Disease Expo:  No


Recent Hopitalizations:  No


Physical Abuse:  No


Sexual Abuse:  No


Mistreated:  No


Fear:  No





Immunizations Up To Date


Date of Influenza Vaccine:  Nov 1, 2019





Seasonal Allergies


Seasonal Allergies:  No





Past Medical History


Surgeries:  Yes


Respiratory:  Yes (USES INHALER, SOB)


Currently Using CPAP:  No


Currently Using BIPAP:  No


Cardiac:  Yes


Chronic Edema/Swelling, Heart Murmur, Hypertension


Neurological:  No


Reproductive Disorders:  No


Female Reproductive Disorders:  Denies


GYN History:  Menopausal


Sexually Transmitted Disease:  No


HIV/AIDS:  No


Genitourinary:  No


Gastrointestinal:  No


Musculoskeletal:  Yes (ARTHRITIS IN BACK )


Arthritis, Chronic Back Pain


Endocrine:  Yes


Diabetes, Non-Insulin dep


HEENT:  Yes (POOR DENTITION)


Cancer:  No


Psychosocial:  Yes


Anxiety, Depression


Integumentary:  No


Blood Disorders:  No


Adverse Reaction/Blood Tranf:  No





Physical Exam


Vital Signs





Vital Signs - First Documented








 4/10/20





 22:27


 


Temp 36.8


 


Pulse 53


 


Resp 20


 


B/P (MAP) 128/88 (101)


 


Pulse Ox 97


 


O2 Delivery Room Air





Capillary Refill : Less Than 3 Seconds


Height, Weight, BMI


Height: 0'0.00"


Weight: 0lbs. 0.0oz. [id number]; 23.00 BMI


Method:


General Appearance:  WD/WN, no apparent distress, other (SPEECH SLURRED, SLOW 

MENTATION--APPEARS INTOXICATED. STRONG ODOR OF ETOH. CHEWING TOBACCO IN MOUTH)


HEENT:  PERRL/EOMI, TMs normal, pharynx normal, other (LARGE HEMATOMA TO LEFT 

BROW AREA; RESIDUE FROM CHEWING TOBACCO IN MOUTH)


Neck:  non-tender, full range of motion, supple, normal inspection


Cardiovascular:  normal peripheral pulses, regular rate, rhythm, no murmur


Respiratory:  chest non-tender, normal breath sounds, no respiratory distress, 

no accessory muscle use


Peripheral Pulses:  1+ Dorsalis Pedis (R), 1+ Left Dors-Pedis (L), 1+ Radial 

Pulses (R), 1+ Radial Pulses (L)


Gastrointestinal:  normal bowel sounds, non tender, soft


Back:  no CVA tenderness, no vertebral tenderness


Extremities:  normal capillary refill, pedal edema (2+ BILATERALLY), other 

(MARKED TENDERNESS AND MODERATE SWELLING AND VERY LIMITED ROM TO LEFT SHOULDER 

AREA. DISTAL MOTOR/SENSORY/VASCULAR INTACT. NO HIP OR LOWER EXTREMITY 

TENDERNESS. )


Neurologic/Psychiatric:  CNs II-XII nml as tested, no motor/sensory deficits, 

alert, other (MENTATION LIMITED BY INTOXICATION)


Skin:  normal color, warm/dry, ecchymosis (LEFT BROW. ), other 

(SORES/SCARS/SCABS TO FOREARMS, FACE AND UPPER BACK)





Sami Coma Score


Best Eye Response:  (4) Open Spontaneously


Best Verbal Response:  (5) Oriented


Best Motor Response:  (6) Obeys Commands


[place] Total:  15





Procedures/Interventions


Splinting and Joint Reduction :  


   Immobilizers:  Medium Shoulder





Progress/Results/Core Measures


Results/Orders


Lab Results





Laboratory Tests








Test


 4/10/20


22:31 Range/Units


 


 


White Blood Count


 8.7 


 4.3-11.0


10^3/uL


 


Red Blood Count


 4.58 


 4.35-5.85


10^6/uL


 


Hemoglobin 15.2  11.5-16.0  G/DL


 


Hematocrit 44  35-52  %


 


Mean Corpuscular Volume 95  80-99  FL


 


Mean Corpuscular Hemoglobin 33  25-34  PG


 


Mean Corpuscular Hemoglobin


Concent 35 


 32-36  G/DL





 


Red Cell Distribution Width 13.5  10.0-14.5  %


 


Platelet Count


 209 


 130-400


10^3/uL


 


Mean Platelet Volume 9.8  7.4-10.4  FL


 


Neutrophils (%) (Auto) 50  42-75  %


 


Lymphocytes (%) (Auto) 38  12-44  %


 


Monocytes (%) (Auto) 6  0-12  %


 


Eosinophils (%) (Auto) 5  0-10  %


 


Basophils (%) (Auto) 1  0-10  %


 


Neutrophils # (Auto) 4.4  1.8-7.8  X 10^3


 


Lymphocytes # (Auto) 3.3  1.0-4.0  X 10^3


 


Monocytes # (Auto) 0.5  0.0-1.0  X 10^3


 


Eosinophils # (Auto)


 0.5 H


 0.0-0.3


10^3/uL


 


Basophils # (Auto)


 0.1 


 0.0-0.1


10^3/uL


 


Prothrombin Time 18.7 H 12.2-14.7  SEC


 


INR Comment 1.5 H 0.8-1.4  


 


Activated Partial


Thromboplast Time 39 H


 24-35  SEC





 


Sodium Level 123 *L 135-145  MMOL/L


 


Potassium Level 3.7  3.6-5.0  MMOL/L


 


Chloride Level 89 L   MMOL/L


 


Carbon Dioxide Level 18 L 21-32  MMOL/L


 


Anion Gap 16 H 5-14  MMOL/L


 


Blood Urea Nitrogen 6 L 7-18  MG/DL


 


Creatinine


 0.83 


 0.60-1.30


MG/DL


 


Estimat Glomerular Filtration


Rate > 60 


  





 


BUN/Creatinine Ratio 7   


 


Glucose Level 149 H   MG/DL


 


Calcium Level 8.8  8.5-10.1  MG/DL


 


Corrected Calcium 8.5  8.5-10.1  MG/DL


 


Total Bilirubin 0.9  0.1-1.0  MG/DL


 


Aspartate Amino Transf


(AST/SGOT) 32 


 5-34  U/L





 


Alanine Aminotransferase


(ALT/SGPT) 17 


 0-55  U/L





 


Alkaline Phosphatase 113    U/L


 


Total Protein 8.4 H 6.4-8.2  GM/DL


 


Albumin 4.4  3.2-4.5  GM/DL


 


Acetaminophen Level < 10 L 10-30  UG/ML


 


Serum Alcohol 268 H <10  MG/DL








My Orders





Orders - MATTHEW DOMÍNGUEZ DO


Ns Iv 1000 Ml (Sodium Chloride 0.9%) (4/10/20 22:31)


Ed Iv/Invasive Line Start (4/10/20 22:29)


Monitor-Rhythm Ecg Trace Only (4/10/20 22:29)


Chest 1 View, Ap/Pa Only (4/10/20 22:29)


Shoulder, Left, 3 Views (4/10/20 22:29)


Humerus, Left, 2 Views (4/10/20 22:29)


Pelvis (4/10/20 22:29)


Acetaminophen (4/10/20 22:29)


Alcohol (4/10/20 22:29)


Cbc With Automated Diff (4/10/20 22:29)


Comprehensive Metabolic Panel (4/10/20 22:29)


Drug Screen Stat (Urine) (4/10/20 22:29)


Protime With Inr (4/10/20 22:29)


Partial Thromboplastin Time (4/10/20 22:29)


Ua Culture If Indicated (4/10/20 22:29)


Ed Iv/Invasive Line Start (4/10/20 22:29)


Ns Iv 1000 Ml (Sodium Chloride 0.9%) (4/10/20 22:29)


Ct Head/Face/Cervical Wo (4/10/20 22:41)


Ed Iv/Invasive Line Start (4/10/20 23:11)


Ns Iv 1000 Ml (Sodium Chloride 0.9%) (4/10/20 23:11)


Cervical Collar (4/10/20 23:13)


Shoulder Immoblizer (4/11/20 00:00)





Medications Given in ED





Current Medications








 Medications  Dose


 Ordered  Sig/Stanislav


 Route  Start Time


 Stop Time Status Last Admin


Dose Admin


 


 Sodium Chloride  1,000 ml @ 


 0 mls/hr  Q0M ONCE


 IV  4/10/20 22:29


 4/10/20 22:39 DC 4/10/20 22:30


1,000 MLS/HR


 


 Sodium Chloride  1,000 ml @ 


 0 mls/hr  Q0M ONCE


 IV  4/10/20 23:11


 4/10/20 23:13 DC 4/11/20 00:02


1,000 MLS/HR








Vital Signs/I&O











 4/10/20





 22:27


 


Temp 36.8


 


Pulse 53


 


Resp 20


 


B/P (MAP) 128/88 (101)


 


Pulse Ox 97


 


O2 Delivery Room Air














Blood Pressure Mean:                    101











Progress


Progress Note :  


Progress Note


CERVICAL COLLAR IMMEDIATELY PLACED ON PT'S ARRIVAL, AND LEFT IN PLACE THROUGHOUT

ER STAY, AS PT IS INTOXICATED





NO DETERIORATION IN PT'S CONDITION DURING ER STAY





Diagnostic Imaging





Comments


CT HEAD/MAXILLOFACIALS/CERVICAL SPINE--LEFT FRONTAL SCALP HEMATOMA, CHRONIC 

MICROVASCULAR CHANGES IN BRAIN, CHRONIC DEGENERATIVE CHANGES OF CERVICAL SPINE, 

LOW DENSITY LESIONS IN LEFT THYROID GLAND, OTHERWISE NO ACUTE PROCESS--PER 

[facility] VIA FAX AT [id number]





CXR--LEFT PROXIMAL HUMERUS FX. CARDIOMEGALY, PENDING RADIOLOGIST REVIEW


PELVIS XRAY--NO ACUTE PROCESS, PENDING RADIOLOGIST REVIEW


XRAYS LEFT SHOULDER AND HUMERUS--MILDLY DISPLACED FRACTURE OF PROXIMAL HUMERUS, 

PENDING RADIOLOGIST REVIEW


   Reviewed:  Reviewed by Me





Departure


Communication (Admissions)


0001--SPOKE WITH DR. GLASS, TRAUMA SURGEON ON CALL. IS AGREEABLE TO ADMITTING 

PT FOR OBSERVATION, AS NO ORTHOPEDIC SERVICES AVAILABLE HERE THIS WEEKEND, AND 

INJURY DOES NOT REQUIRE IMMEDIATE SURGERY. 


0004--SPOKE WITH DR. DICKERSON, HOSPITALIST ON CALL FOR [facility]. ACCEPTS PT FOR 

ADMIT





Impression





   Primary Impression:  


   S/P FALL FROM STANDING


   Additional Impressions:  


   Closed fracture of left proximal humerus


   Minor head injury without loss of consciousness


   Hyponatremia


   Alcohol intoxication


   NIDDM


   Periorbital hematoma of left eye


Disposition:  09 ADMITTED AS INPATIENT


Condition:  Stable





Admissions


Decision to Admit Reason:  Admit from ER (General)


Decision to Admit/Date:  Apr 11, 2020


Time/Decision to Admit Time:  00:01





Departure-Patient Inst.


Referrals:  


[facility] (PCP/Family)


Primary Care Physician











MATTHEW DOMÍNGUEZ DO                 Apr 10, 2020 23:29

## 2020-04-10 NOTE — XMS REPORT
[facility]

                             Created on: 2020



Radha Hardin

External Reference #: [id number]

: 1954

Sex: Female



Demographics





                          Address                   [address] [place] [address]  [address]

 

                          Preferred Language        Unknown

 

                          Marital Status            Unknown

 

                          Faith Affiliation     Unknown

 

                          Race                      Unknown

 

                          Ethnic Group              Unknown





Author





                          Author                    Radha Smith Doctor

 

                          Organization              [facility]

 

                          Address                   Unknown

 

                          Phone                     Unavailable







Care Team Providers





                    Care Team Member Name Role                Phone

 

                    Migration,  Doctor  Unavailable         Unavailable







PROBLEMS





          Type      Condition ICD9-CM Code JQH73-RE Code Onset Dates Condition S

tatus SNOMED 

Code

 

          Problem   Essential (primary) hypertension           I10              

   Active    01286001

 

          Problem   Gastro-esophageal reflux disease without esophagitis        

   K21.9               Active    

[id number]

 

                          Problem                   Type 2 diabetes mellitus wit

h diabetic polyneuropathy, without long-term

current use of insulin              E11.42                    Active       36443

006

 

          Problem   Lumbar degenerative disc disease           M51.36           

   Active    25864570

 

          Problem   Other schizoaffective disorders           F25.8             

  Active    59570652

 

          Problem   Gastric reflux           K21.9               Active    95130

7003

 

          Problem   Thyroid nodule           E04.1               Active    51912

5005

 

          Problem   COPD mixed type           J44.9               Active    1364

5005

 

          Problem   Chronic kidney disease, stage 3           N18.3             

  Active    482240262

 

                Problem         Migraine without aura and without status migrain

osus, not intractable                 

G43.009                                 Active              689839066

 

          Problem   COPD exacerbation           J44.1               Active    19

9689448

 

          Problem   Degenerative disc disease, cervical           M50.30        

      Active    20019439

 

          Problem   Left lower quadrant abdominal pain           R10.32         

     Active    122439303

 

          Problem   Mixed incontinence           N39.46              Active    4

13515686

 

          Problem   Rotator cuff tear           M75.100             Active    41

80694

 

                Problem         Incomplete tear of right rotator cuff, unspecifi

ed whether traumatic                 

M75.111                                 Active              125611303







ALLERGIES

No Information



ENCOUNTERS





                Encounter       Location        Date            Diagnosis

 

                [facility]  [facility] [facility] [place] [address] 13

 Mar, 2020    COPD 

mixed type J44.9 and Type 2 diabetes mellitus with diabetic polyneuropathy, 
without long-term current use of insulin E11.42

 

                [facility]  [place]  [facility] [place] [address]      

 

                [facility]  [place]  [facility] [place] [address]      

 

                [facility]  [place]  [facility] [id number] [place] [address]      

 

                [facility]  [place]  [facility] [place] [address]     

Lumbago M54.5

 

                [facility]  [place]  [facility] [place] [address]      

 

                [facility]  [place]  [facility] [place] [address] 10

 Yung, 2020     

 

                [facility]  [place]  [facility] [place] [address] 08

 2020     

 

                [facility]  [place]  [facility] [place] [address]      

 

                [facility]  [place]  [facility] [place] [address]      

 

                [facility]  [place] [address] [place] [id number] [address]     

Incomplete tear of right rotator cuff, unspecified whether traumatic M75.111 ; 
Essential (primary) hypertension I10 and Thyroid nodule E04.1

 

                    [facility] [address] [facility] [place] [address]                                 

 

                [facility]  [place] [address] [place] [id number] [address] 31

 Dec, 2019     

 

                [facility]  [place] [address] [id number] [address] 30

 Dec, 2019    COPD 

exacerbation J44.1

 

                [facility]  [place]  [facility] [id number] [place] [address] 27

 Dec, 2019     

 

                [facility]  [place] [address] [id number] [address] 26

 Dec, 2019     

 

                [facility]  [place] [address] [id number] [address] 20

 Dec, 2019    

Nausea R11.0 ; Incomplete tear of right rotator cuff, unspecified whether 
traumatic M75.111 and Essential (primary) hypertension I10

 

                [facility]  [place]  [facility] [place] [id number] [address] 20

 Dec, 2019     

 

                [facility]  [place]  [facility] [place] [id number] [place] [address] 18

 Dec, 2019     

 

                [facility]  [place]  [facility] [place] [id number] [place] [address] 16

 Dec, 2019     

 

                [facility]  [place]  [facility] [place] [id number] [address] 11

 Dec, 2019    

Rotator cuff tear M75.100

 

                [facility]  [place]  [facility] [place] [id number] [address] 09

 Dec, 2019     

 

                [facility]  [place]  [facility] [place] [id number] [place] [address] 05

 Dec, 2019    

Essential (primary) hypertension I10 ; Type 2 diabetes mellitus with diabetic 
polyneuropathy, without long-term current use of insulin E11.42 ; Rotator cuff 
tear M75.100 and Chronic kidney disease, stage 3 N18.3

 

                [facility]  [place]  [facility] [place] [id number] [place] [address] 03

 Dec, 2019     

 

                [facility]  [place] [address] [place] [address] ST [id number] [place] [address] 02

 Dec, 2019     

 

                [facility]  [place] [address] [place] [address] ST [id number] [place] [address] 02

 Dec, 2019     

 

                [facility]  [place]  [facility] [place] [id number] [place] [address]      

 

                [facility]  [place]  [facility] [id number] [address]      

 

                [facility]  [place]  [facility] [place] [id number] [place] [address]      

 

                [facility]  [place]  [facility] [place] [id number] [place] [address]     

Rotator cuff tear M75.100

 

                [facility]  [place]  [facility] [place] [id number] [place] [address]      

 

                [facility]  [place] [address] [place] [address] [place] [id number] [place] [address]     

Rotator cuff tear M75.100 ; Major depressive disorder with current active 
episode, unspecified depression episode severity, unspecified whether recurrent 
F32.9 ; Type 2 diabetes mellitus with diabetic polyneuropathy, without long-term
current use of insulin E11.42 and Essential (primary) hypertension I10

 

                [facility]  [place] [address] [place] [address] [place] [id number] [address]      

 

                [facility]  [place] [address] [place] [address] [place] [id number] [place] [address]      

 

                [facility]  [place] [address] [place] [address] [id number] [address] 31

 Oct, 2019     

 

                [facility]  [place] [address] [place] [address] [place] [id number] [address] 29

 Oct, 2019     

 

                [facility]  [place] [address] [place] [address] [place] [id number] [place] [address] 21

 Oct, 2019    

Rotator cuff tear M75.100 ; Essential (primary) hypertension I10 and COPD mixed 
type J44.9

 

                [facility]  [place] [address] [place] [address] [place] [id number] [place] [address] 18

 Oct, 2019    

Rotator cuff tear M75.100

 

                [facility]  [place] [address] [place] [address] [place] [id number] [place] [address] 15

 Oct, 2019     

 

                [facility]  [place] [address] [place] [address] [place] [id number] [place] [address] 11

 Oct, 2019    

Preprocedural examination Z01.818

 

                [facility]  [place] [address] [place] [address] [place] [id number] [place] [address] 09

 Oct, 2019     

 

                [facility]  [place] [address] [place] [address] [place] [id number] [address] 09

 Oct, 2019     

 

                [facility]  [place] [address] [place] [address] [place] [id number] [place] [address] 08

 Oct, 2019    

Traumatic complete tear of right rotator cuff, initial encounter S46.011A ; 
Essential (primary) hypertension I10 ; Encounter for immunization Z23 ; Leg 
cramps R25.2 ; COPD mixed type J44.9 and Fatigue R53.83

 

                [facility]  [place] [address] [place] [id number] [address] 24

 Sep, 2019     

 

                [facility]  [place] [address] [id number] [address] 18

 Sep, 2019     

 

                [facility]  [place] [address] [id number] [address] 16

 Sep, 2019     

 

                [facility]  [place] [address] [id number] [address] 13

 Sep, 2019     

 

                [facility]  [place] [address] [id number] [address] 12

 Sep, 2019    

Traumatic complete tear of right rotator cuff, initial encounter S46.011A ; Left
lower quadrant abdominal pain R10.32 ; Essential (primary) hypertension I10 ; 
COPD mixed type J44.9 and Long term use of drug Z79.899

 

                [facility]  [place] [address] [id number] [address] 10

 Sep, 2019     

 

                [facility]  [place] [address] [id number] [address] 09

 Sep, 2019    

Gastroenteritis and colitis, viral A08.4

 

                [facility]  [place] [address] [id number] [address] 04

 Sep, 2019     

 

                [facility]  [place] [address] [id number] [address] 30

 Aug, 2019    

Traumatic complete tear of right rotator cuff, initial encounter S46.011A ; 
Chronic obstructive pulmonary disease, unspecified COPD type J44.9 and Mixed 
incontinence N39.46

 

                [facility]  [place] [address] [id number] [address] 29

 Aug, 2019     

 

                [facility]  [place] [address] [id number] [address] 27

 Aug, 2019     

 

                [facility]  [place] [address] [id number] [address] 24

 Aug, 2019    

Injury of right shoulder, initial encounter S49.91XA

 

                [facility]  [place] [address] [place] [address] [place] [address] 05

 Aug, 2019     

 

                [facility]  [place] [address] [place] [id number] [place] [address] 03

 Aug, 2019     

 

                [facility]  [place] [address] [place] [id number] [place] [address]      

 

                [facility]  [place] [address] [place] [id number] [place] [address]      

 

                [facility]  [place] [address] [place] [address] [place] [address]     

Essential (primary) hypertension I10 ; Type 2 diabetes mellitus with diabetic 
polyneuropathy, without long-term current use of insulin E11.42 ; COPD mixed 
type J44.9 ; Thyroid nodule E04.1 and Degenerative disc disease, cervical M50.30

 

                [facility]  [place] [address] [place] [address] [place] [address]      

 

                [facility]  [place] [address] [place] [id number] [place] [address]      

 

                [facility]  [place] [address] [place] [address] [place] [address]      

 

                [facility]  [place] [address] [place] [id number] [place] [address] 24

 2019    

Gastroenteritis and colitis, viral A08.4

 

                [facility]  [place] [address] [place] [id number] [place] [address]     

Chronic obstructive pulmonary disease, unspecified COPD type J44.9

 

                [facility]  [place] [address] [id number] [address]     

Gastric reflux K21.9

 

                [facility]  [place] [address] [id number] [address]      

 

                [facility]  [place] [address] [id number] [address] 14

 2019    

Essential (primary) hypertension I10

 

                [facility]  [place] [address] [place] [address] [place] [address]      

 

                [facility]  [place] [id number] [place] [address]     

Chronic obstructive pulmonary disease, unspecified COPD type J44.9 ; Type 2 
diabetes mellitus with diabetic polyneuropathy, without long-term current use of
insulin E11.42 ; Essential (primary) hypertension I10 ; Gastro-esophageal reflux
disease without esophagitis K21.9 and Varicella vaccination Z23

 

                [facility]  [place] [address] [place], [place] [id number] [place] [address]     

Dyspnea on exertion R06.09 and Infiltrate noted on imaging study R93.89

 

                [facility] [address] [id number] [address] 28

 May, 2019    

Thyroid pain E07.89

 

                [facility] [address] [place] [id number] [address] 25

 May, 2019    

Thyroid pain E07.89

 

                [facility] [address] [place], [place] [address] [place] [address] 03

 May, 2019    Other

schizoaffective disorders F25.8

 

                [facility] [address] [place] [address]     Type 

2 diabetes mellitus with diabetic polyneuropathy, without long-term current use 
of insulin E11.42 ; Gastric reflux K21.9 and Diabetic retinopathy of right eye 
associated with type 2 diabetes mellitus, macular edema presence unspecified, 
unspecified retinopathy severity E11.319

 

                [facility]  [place] [address] [place] [address] [place] [address] 20

 Mar, 2019     

 

                [facility] [address] [place] [address] [address] 19

 Mar, 2019    Type 

2 diabetes mellitus with diabetic polyneuropathy, without long-term current use 
of insulin E11.42 and Gastric reflux K21.9

 

                [facility] [address] [place] [address] [address] 18

 Mar, 2019     

 

                [facility] [address] [id number] [address] 12

 Mar, 2019    Acute

vaginitis N76.0 and Other schizoaffective disorders F25.8

 

                    [facility] [address] [facility] [place] [address] 08 

Mar, 2019                                

 

                [facility]  [place] [address] [id number] [address] 01

 Mar, 2019     

 

                [facility]  [place] [address] [id number] [address]      

 

                [facility]  [place] [address] [id number] [address]      

 

                [facility]  [place] [address] [id number] [address]      

 

                [facility]  [place] [address] [id number] [address]     

Dental examination Z01.20 and Caries K02.9

 

                [facility]  [place] [address] [id number] [address]     Type 

2 diabetes mellitus without complications E11.9

 

                [facility]  [place] [address] [id number] [address]      

 

                [facility]  [place] [address] [id number] [address]     

Migraine without aura and without status migrainosus, not intractable G43.009

 

                [facility]  [place]  [facility] [place] [address]      

 

                [facility]  [place] [address] [id number] [address]      

 

                [facility]  [place] [address] [id number] [address]      

 

                [facility]  [place] [address] [id number] [address] 09

 2019    

Shortness of breath R06.02

 

                [facility]  [place] [address] [id number] [address] 07

 2019    

Dental examination Z01.20

 

                [facility]  [place] [address] [id number] [address] 05

 2019    Acute

intractable headache, unspecified headache type R51 and BMI 40.0-44.9, adult 
Z68.41

 

                [facility]  [place] [address] [id number] [address]      

 

                [facility]  [place] [address] [id number] [address]      

 

                [facility]  [place] [address] [id number] [address]      

 

                [facility]  [place] [address] [id number] [address] 31

 Dec, 2018     

 

                [facility]  [place] [address] [id number] [address] 20

 Dec, 2018     

 

                [facility]  [place] [address] [id number] [address] 20

 Dec, 2018     

 

                [facility]  [place] [address] [id number] [address] 18

 Dec, 2018    

Impetigo L01.00 and Sore in nose J34.89

 

                [facility]  [place]  [facility] [place] [address] 17

 Dec, 2018    

Degenerative disc disease, cervical M50.30

 

                [facility]  [place] [address] [id number] [address] 11

 Dec, 2018    Sore 

in nose J34.89

 

                [facility]  [place] [address] [id number] [address] 11

 Dec, 2018    Sore 

in nose J34.89 ; Excoriation of abdomen, initial encounter S30.811A ; Encounter 
for immunization Z23 and BMI 40.0-44.9, adult Z68.41

 

                [facility]  [place] [address] [id number] [address]      

 

                [facility]  [place] [address] [id number] [address]      

 

                [facility]  [place] [address] [id number] [address]     Type 

2 diabetes mellitus without complications E11.9

 

                [facility]  [place] [address] [id number] [address]      

 

                [facility]  [place] [address] [id number] [address]     

Degenerative disc disease, cervical M50.30

 

                [facility]  [place] [address] [id number] [address]     

Cervicalgia M54.2

 

                [facility]  [place] [address] [id number] [address]      

 

                [facility]  [place] [address] [id number] [address]      

 

                [facility]  [place] [address] [id number] [address]      

 

                [facility]  [place] [address] [id number] [address]     

Gastroenteritis and colitis, viral A08.4

 

                [facility]  [place] [address] [id number] [address] 22

 Oct, 2018    Type 

2 diabetes mellitus without complications E11.9 ; Degenerative disc disease, 
cervical M50.30 ; Essential (primary) hypertension I10 ; Gastro-esophageal 
reflux disease without esophagitis K21.9 and BMI 40.0-44.9, adult Z68.41

 

                [facility]  [place] [address] [id number] [address] 26

 Sep, 2018    

Degenerative disc disease, cervical M50.30 ; Type 2 diabetes mellitus without 
complications E11.9 ; Encounter for immunization Z23 ; Long term current use of 
insulin Z79.4 and BMI 40.0-44.9, adult Z68.41

 

                [facility]  [place] [address] [id number] [address] 20

 Sep, 2018    

Degenerative cervical disc M50.30

 

                [address] [address] 19

 Sep, 2018     

 

                [place] [address] [place] [address] 18

 Sep, 2018    

Generalized pruritus L29.9 ; Lumbar degenerative disc disease M51.36 and BMI 
40.0-44.9, adult Z68.41

 

                [place] [address] [place] [address] 06

 Sep, 2018     

 

                [address] [address] 04

 Sep, 2018    

Cervicalgia M54.2 and Essential (primary) hypertension I10

 

                [place] [address] [place] [address] 27

 Aug, 2018    Type 

2 diabetes mellitus without complications E11.9

 

                [place] [address] [place] [address] 30

 2018    

Gastroenteritis and colitis, viral A08.4 and BMI 40.0-44.9, adult Z68.41

 

                [place] [address] [place] [address] 16

 2018    Type 

2 diabetes mellitus without complications E11.9 ; Essential (primary) 
hypertension I10 ; Screening for breast cancer Z12.31 ; Degenerative lumbar disc
M51.36 ; BMI 40.0-44.9, adult Z68.41 and Morbid obesity E66.01

 

                [address] [address] 

18    BMI 40.0-44.9, 

adult Z68.41 and Other schizoaffective disorders F25.8

 

                [address] [address] 

18    Other 

schizoaffective disorders F25.8 ; Lumbar degenerative disc disease M51.36 and 
BMI 40.0-44.9, adult Z68.41

 

                [address] [address] 21 May, 20

18    Shortness of 

breath R06.02

 

                [address] [address] 21 May, 20

18     

 

                [address] [address] 15 May, 20

18     

 

                [address] [address] 14 May, 20

18     

 

                [address] [address] 12 May, 20

18     

 

                [address] [address] 08 May, 20

18    Vaginal 

bleeding N93.9

 

                [address] [address] 01 May, 20

18    BMI 40.0-44.9, 

adult Z68.41 and Vaginal bleeding N93.9

 

                [address] [address] 

18    Dysfunction of 

both eustachian tubes H69.83

 

                [address] [address] 10 Apr, 20

18    BMI 40.0-44.9, 

adult Z68.41 ; Essential (primary) hypertension I10 and Impetigo L01.00

 

                [address] [address] 27 Mar, 20

18     

 

                [address] [address] 26 Mar, 20

18    Type 2 diabetes

mellitus with diabetic polyneuropathy, without long-term current use of insulin 
E11.42 ; BMI 40.0-44.9, adult Z68.41 ; Other schizoaffective disorders F25.8 ; 
Gastro-esophageal reflux disease without esophagitis K21.9 ; Essential (primary)
hypertension I10 and Impetigo L01.00

 

                [address] [address] 16 Mar, 20

18    Type 2 diabetes

mellitus with diabetic polyneuropathy, without long-term current use of insulin 
E11.42

 

                [address] [place] [address] 

18     

 

                [facility] [address] [address] 

18     

 

                [address] [address] 

18    Type 2 diabetes

mellitus with diabetic polyneuropathy, without long-term current use of insulin 
E11.42 ; Other schizoaffective disorders F25.8 ; Gastro-esophageal reflux 
disease without esophagitis K21.9 ; Essential (primary) hypertension I10 and 
Impetigo L01.00

 

                [address] [address] 13 Dec, 20

17    Shortness of 

breath R06.02 ; Type 2 diabetes mellitus without complications E11.9 ; BMI 40.0-
44.9, adult Z68.41 and Pre-ulcerative corn or callous L84

 

                [address] [address] 06 Dec, 20

17     

 

                [facility] [address] [address] 

17    Medicare 

welcome exam Z00.00 ; BMI 40.0-44.9, adult Z68.41 ; Medicare annual wellness 
visit, initial Z00.00 ; Medicare annual wellness visit, subsequent Z00.00 and 
Encounter for immunization Z23

 

                [facility] [address] [address] 

17    Foot callus L84

and Type 2 diabetes mellitus without complications E11.9

 

                [facility] [address] [address] 30 Oct, 20

17     

 

                [facility] [address] [address] 09 Oct, 20

17    Encounter for 

immunization Z23

 

                [facility] [address] [address] 06 Sep, 20

17    Type 2 diabetes

mellitus without complications E11.9 and Polyneuropathy associated with 
underlying disease G63

 

                [address] [address] 31 Aug, 20

17    Edema R60.9 ; 

Type 2 diabetes mellitus without complications E11.9 and Anemia, unspecified 
type D64.9

 

                [facility] [address] 23 Aug, 

17    Hip pain, left 

M25.552

 

                [facility]   [address] [place] [address] 03 Aug, 

17     

 

                [facility]   [address] [place] [address] 

17     

 

                [facility]   [address] [place] [address] 

17     

 

                [facility] [address] 15 , 

17     

 

                [facility]   [address] [place] [address] 14 , 

17     

 

                [facility]   [address] [place] [address] 13 

17     

 

                [facility]   [address] [place] [address] 12 , 

17     

 

                [facility]   [address] [place] [address] 08 

17     

 

                [facility]   [address] [place] [address] 06 

17    Type 2 diabetes

mellitus without complications E11.9 and Essential (primary) hypertension I10

 

                [facility]   [address] [place] [address] 22 May, 20

17     

 

                [facility]   [address] [place] [address] 16 May, 

17    Frequency of 

micturition R35.0 ; Dysuria R30.0 and Type 2 diabetes mellitus without 
complications E11.9

 

                [facility] [address] , 

17     

 

                [facility]   [address] [place] [address] , 

17     

 

                [facility]   [address] [place] [address] , 

17     

 

                [facility]   [address] [place] [address] [place] [address] 06 

17     

 

                [address] [address] 08 Mar, 20

17    Cellulitis of 

head except face L03.811

 

                [facility] [address] [address] 09 Feb, 

17     

 

                [facility] [address] [address] 

17     

 

                [address] [address] 

17    Flu-like 

symptoms R68.89 and Influenza B J10.1

 

                [address] [address] 

17    Type 2 diabetes

mellitus without complications E11.9

 

                [address] [address] 

17     

 

                [address] [address] 10 Yung, 20

17    Type 2 diabetes

mellitus without complications E11.9 ; Anemia, unspecified type D64.9 ; 
Essential (primary) hypertension I10 and Fatigue R53.83

 

                [address] [address] 

17    Type 2 diabetes

mellitus without complications E11.9 and Foot callus L84

 

                [address] [address] 22 Dec, 20

16     

 

                [address] [address] 05 Dec, 20

16     

 

                [address] [address] 

16     

 

                [address] [address] 

16    Shortness of 

breath R06.02

 

                [address] [address] 

16    Shortness of 

breath R06.02

 

                [address] [address] 06 Oct, 20

16    Shortness of 

breath R06.02

 

                [address] [address] 19 Aug, 20

16    Screening for 

cervical cancer Z12.4 ; Edema R60.9 ; Screening for breast cancer Z12.39 and 
Screening for colon cancer Z12.11

 

                [address] [address] 12 Aug, 20

16    Blood in stool 

K92.1

 

                [facility]   [address] [place] [address] 11 Aug, 20

16    Anemia, 

unspecified type D64.9

 

                [address] [address] 09 Aug, 20

16    Edema R60.9 ; 

Shortness of breath R06.02 ; Anemia, unspecified type D64.9 and Gastroesophageal
reflux disease without esophagitis K21.9

 

                [address] [address] 05 Aug, 20

16    Shortness of 

breath R06.02 and Edema R60.9

 

                [facility]   [address] [place] [address] 04 Aug, 20

16     

 

                [address] [address] 04 Aug, 20

16    Shortness of 

breath R06.02 and Edema R60.9

 

                [address] [address] 01 Aug, 20

16     

 

                    [facility] [address] [facility] [place] [address]                                Shortness of breath R06.02

 

                [facility]   [address] [place] [address] 

16    Shortness of 

breath R06.02 ; Edema R60.9 and Gastroesophageal reflux disease without 
esophagitis K21.9

 

                [address] [address] 

16    Shortness of 

breath R06.02

 

                [address] [address] , 

16     

 

                [facility]   [address] [place] [address] 27 , 

16    Dental 

examination Z01.20

 

                [address] [address] 10 , 

16     

 

                [facility] [address] [address] 10 , 

16     

 

                [address] [address] 09 

16    Edema R60.9 ; 

Type 2 diabetes mellitus without complications E11.9 and Fatigue R53.83

 

                [address] [address] 18 May, 20

16    Edema R60.9 and

Abdominal muscle strain, initial encounter S39.011A

 

                [address] [address] 

16     

 

                [address] [address] 18 

16    Hip pain, right

M25.551 and Edema R60.9

 

                [address] [address] 

16     

 

                [address] [address] 04 

16    Edema R60.9 and

Impetigo L01.00

 

                [address] [address] 

16    Type 2 diabetes

mellitus without complications E11.9 and Edema R60.9

 

                [address] [address] 

16     

 

                [address] [address] 

16    Contusion of 

thigh, right S70.11XA

 

                [address] [address] 

16    Hip pain, right

M25.551

 

                [address] [address] 

16     

 

                [facility] [address] [address] 23 Dec, 20

15    Left hip pain 

M25.552

 

                [address] [address] 04 Dec, 20

15    Acute maxillary

sinusitis, recurrence not specified J01.00 and Vomiting, nausea presence 
unspecified, unspecified intactability, vomiting of unspecified type R11.10

 

                [address] [address] 01 Dec, 20

15    Acute maxillary

sinusitis, recurrence not specified J01.00

 

                [address] [address] 08 Oct, 20

15    Acquired 

spondylolisthesis of lumbosacral region M43.17 ; Encounter for immunization Z23 
and Cholelithiasis K80.20

 

                [address] [address] 28 Sep, 20

15     

 

                [address] [address] 24 Sep, 20

15    Lumbar 

radiculopathy 724.4

 

                [address] [address] 21 Sep, 20

15     

 

                [address] [address] 27 Aug, 20

15    Esophageal 

reflux 530.81

 

                [address] [address] 20 Aug, 20

15    Esophageal 

reflux 530.81 ; Essential hypertension, benign 401.1 and Diabetes mellitus 
without mention of complication, type II or unspecified type, not stated as 
uncontrolled 250.00

 

                [address] [address] 07 Aug, 20

15     

 

                [address] [address] 05 Aug, 20

15    Myalgia and 

myositis 729.1

 

                [address] [address] 

15    Upper 

respiratory infection 465.9 ; Other symptoms involving skin and integumentary 
tissues 782.9 and Factitial dermatitis 698.4

 

                [facility]   [address] [place] [address] 

15    Factitial 

dermatitis 698.4

 

                [facility]   [address] [place] [address] 

15     

 

                [facility]   [address] [place] [address] 

15    Esophageal 

reflux 530.81

 

                [facility]   [address] [place] [address] 

15     

 

                [facility]   [address] [place] [address] 

15    Esophageal 

reflux 530.81

 

                [facility]   [address] [place] [address] 27 May, 20

15     

 

                [facility]   [address] [place] [address] 22 May, 20

15    Dyspepsia 536.8

and Epigastric pain 789.06

 

                [facility]   [address] [place] [address] 05 May, 20

15     

 

                [facility]   [address] [place] [address] 04 May, 20

15     

 

                [facility]   [address] [place] [address] 04 May, 20

15    Impetigo 684

 

                [facility] [address] [address] 

15    Other symptoms 

involving skin and integumentary tissues 782.9 and Seborrheic keratosis 702.19

 

                    [address] [place] [address]                                 

 

                    [address] [place] [address]                                 

 

                [facility]   [address] [place] [address] 20 Mar, 20

15     

 

                    [address] [place] [address] 20 

Mar, 2015                                

 

                [facility] [address] 18 Mar, 

15     

 

                    [facility] [address] [facility] [place] [address] 18 

Mar, 2015                                

 

                [facility] [address] 16 Mar, 

15     

 

                    [facility] [id number] [place] [address] 16 

Mar, 2015                                

 

                [facility] [address] 05 Mar, 20

15     

 

                    [facility] [id number] [place] [address] 05 

Mar, 2015                                

 

                [facility] [address] 

15     

 

                    [facility] [id number] [place] [address]                                 

 

                [facility] [address] 

15     

 

                    [facility] [id number] [place] [address]                                 

 

                [facility] [address] 08 Dec, 20

14     

 

                    [facility] [id number] [place] [address] 08 

Dec, 2014                                

 

                [facility] [address] 

14     

 

                    [facility] [id number] [place] [address]                                 

 

                [facility] [address] 08 Oct, 20

14     

 

                    [facility] [id number] [place] [address] 08 

Oct, 2014                                

 

                [facility] [address] 02 Oct, 20

14     

 

                    [facility] [id number] [place] [address] 02 

Oct, 2014                                

 

                    [facility] [address] [address] 12 

Sep, 2014                                

 

                [facility] [address] 09 Sep, 20

14     

 

                    [facility] [address] [address] 09 

Sep, 2014                                

 

                    [facility] [address] [address] 03 

Sep, 2014                                

 

                [facility] [address] 03 Sep, 20

14     

 

                [facility] [address] 

14     

 

                    [facility] [address] [address]                                 

 

                [facility]   [address] [place] [address] 02 Mar, 20

14     

 

                    [facility] [address] [address] 02 

Mar, 2014                                

 

                [facility] [address] 

14     

 

                    [facility] [address] [address]                                 

 

                    [facility] [address] [address]                                 

 

                [facility] [address] 

14     

 

                    [facility] [address] [address]                                 

 

                [facility] [address] 

14     

 

                    [facility] [address] [address]                                 

 

                [facility] [place] [address] 

13     

 

                    [facility] [address] [facility] [place] [address]                                 

 

                [facility] [place] [address] 

13     

 

                    [facility] [address] [facility] [place] [address]                                 

 

                [facility] [place] [address] 

13     

 

                    [facility] [address] [facility] [place] [address]                                 







IMMUNIZATIONS

No Known Immunizations



SOCIAL HISTORY

Never Assessed



REASON FOR VISIT





PLAN OF CARE





VITAL SIGNS





                    Weight              205.39 lbs          2014

 

                    Temperature         97.7 degrees Fahrenheit 2014

 

                    Heart Rate          72 bpm              2014

 

                    Respiratory Rate    16                  2014

 

                    Blood pressure systolic 106 mmHg            2014

 

                    Blood pressure diastolic 58 mmHg             2014







MEDICATIONS

Unknown Medications



RESULTS

No Results



PROCEDURES

No Known procedures



INSTRUCTIONS





MEDICATIONS ADMINISTERED

No Known Medications



MEDICAL (GENERAL) HISTORY





                    Type                Description         Date

 

                    Medical History     Other symptoms involving skin and integu

mentary tissues  

 

                    Medical History     Impetigo             

 

                    Medical History     Schizoaffective disorder, unspecified  

 

                    Medical History     Essential hypertension, benign  

 

                          Medical History           Diabetes mellitus without me

ntion of complication, type II or 

unspecified type, not stated as uncontrolled  

 

                    Medical History     Urinary frequency    

 

                    Medical History     Encounter for long-term (current) use of

 other medications  

 

                    Medical History     Esophageal reflux    

 

                    Medical History     DIABETES TYPE II     

 

                    Medical History     HIGH BLOOD PRESSURE- pt states sometimes

 it goes low  

 

                    Medical History     BACK TROUBLE         

 

                    Surgical History    cholecystectomy      

 

                    Surgical History    heart cath          2019

 

                    Hospitalization History Surgery(s) only      

 

                          Hospitalization History   possible pneumonia or fluid 

on around heart-sent to [facility] 

heart/thinking COPD                     2019

## 2020-04-10 NOTE — XMS REPORT
[facility]

                             Created on: 2020



Radha Hardin

External Reference #: [id number]

: 1954

Sex: Female



Demographics





                          Address                   [address] [place]  [address]

 

                          Preferred Language        Unknown

 

                          Marital Status            Unknown

 

                          Episcopalian Affiliation     Unknown

 

                          Race                      Unknown

 

                          Ethnic Group              Unknown





Author





                          Author                    Radha DOWNS

 

                          Organization              [facility]  [place]

 

                          Address                   [address] [place]  [address]



 

                          Phone                     [phone]







Care Team Providers





                    Care Team Member Name Role                Phone

 

                    DOROTEO DOWNS       Unavailable         [phone]







PROBLEMS





          Type      Condition ICD9-CM Code DQC75-RY Code Onset Dates Condition S

tatus SNOMED 

Code

 

                          Problem                   Type 2 diabetes mellitus wit

h diabetic polyneuropathy, without long-term

current use of insulin              E11.42                    Active       73593

006

 

          Problem   Essential (primary) hypertension           I10              

   Active    37254561

 

          Problem   Other schizoaffective disorders           F25.8             

  Active    32187420

 

          Problem   Gastro-esophageal reflux disease without esophagitis        

   K21.9               Active    

062958463

 

          Problem   Degenerative disc disease, cervical           M50.30        

      Active    62116967

 

          Problem   Lumbar degenerative disc disease           M51.36           

   Active    35217101

 

          Problem   Gastric reflux           K21.9               Active    35137

7003

 

                Problem         Migraine without aura and without status migrain

osus, not intractable                 

G43.009                                 Active              219876924

 

          Problem   Left lower quadrant abdominal pain           R10.32         

     Active    218087984

 

          Problem   Mixed incontinence           N39.46              Active    4

40471446

 

          Problem   Rotator cuff tear           M75.100             Active    41

02674

 

          Problem   GERD with esophagitis           K21.0               Active  

  430748146

 

          Problem   Thyroid nodule           E04.1               Active    54160

5005

 

           Problem    Moderate episode of recurrent major depressive disorder   

         F33.1                 Active

                                        103504912

 

          Problem   COPD mixed type           J44.9               Active    1364

5005

 

                Problem         Incomplete tear of right rotator cuff, unspecifi

ed whether traumatic                 

M75.111                                 Active              887952411

 

          Problem   Chronic kidney disease, stage 3           N18.3             

  Active    217055510

 

          Problem   COPD exacerbation           J44.1               Active    19

6100198

 

          Problem   Urge incontinence           N39.41              Active    87

003179







ALLERGIES

No Information



ENCOUNTERS





                Encounter       Location        Date            Diagnosis

 

                [facility]  [place]  [facility] [place] [address] 24

 Mar, 2020     

 

                [facility]  [place]  [facility] [place] [address] 13

 Mar, 2020    COPD 

mixed type J44.9 and Type 2 diabetes mellitus with diabetic polyneuropathy, 
without long-term current use of insulin E11.42

 

                [facility]  [place]  [place] [id number] [address] 10

 Mar, 2020     

 

                [facility]  [place]  [facility] [address] 06

 Mar, 2020    GERD 

with esophagitis K21.0 ; Other schizoaffective disorders F25.8 ; Type 2 diabetes
mellitus with diabetic polyneuropathy, without long-term current use of insulin 
E11.42 ; COPD mixed type J44.9 and Post-menopausal Z78.0

 

                [facility]  [place]  [id number] [address] 04

 Mar, 2020    Other

schizoaffective disorders F25.8 and Type 2 diabetes mellitus with diabetic 
polyneuropathy, without long-term current use of insulin E11.42

 

                    [facility] [address] [facility] [place] [address] 03 

Mar, 2020                                

 

                [facility]  [place] [address] [place] [id number] [address] 02

 Mar, 2020     

 

                [facility]  [place] [address] [place], [place] [address] [place] [address] 28

 2020    Type 

2 diabetes mellitus with diabetic polyneuropathy, without long-term current use 
of insulin E11.42 ; Moderate episode of recurrent major depressive disorder 
F33.1 and Other schizoaffective disorders F25.8

 

                [facility]  [place]  [facility] [address]      

 

                [facility]  [place]  [facility] [address]      

 

                [facility]  [place]  [id number] [address]      

 

                [facility]  [place]  [facility] [address]     

Incomplete tear of right rotator cuff, unspecified whether traumatic M75.111

 

                [facility]  [place]  [facility] [place] [id number] [place] [address] 20

 Feb,      

 

                [facility]  [place]  [facility] [place] [id number] [place] [address] 19

 Feb,      

 

                [facility]  [place]  [facility] [place] [id number] [address] 18

 Feb,     Other

schizoaffective disorders F25.8

 

                [facility]  [place]  [facility] [place] [id number] [place] [address] 13

 Feb,      

 

                [facility]  [place]  [facility] [place] [id number] [address] 12

 Feb,      

 

                [facility]  [place]  [facility] [place] [id number] [place] [address] 11

 Feb,      

 

                [facility]  [place]  [facility] [place] [id number] [place] [address] 10

 Feb,      

 

                [facility]  [place]  [facility] [place] [id number] [place] [address] 07

 Feb,      

 

                [facility]  [place]  [facility] [id number] [address] 05

 Feb,      

 

                [facility]  [place]  [facility] [place] [id number] [place] [address] 04

 Feb,      

 

                [facility]  [place]  [facility] [place] [id number] [place] [address] 03

 Feb,      

 

                [facility]  [place]  [facility] [place] [id number] [place] [address] 01

 Feb,      

 

                [facility]  [place]  [facility] [place] [id number] [place] [address]     

Incomplete tear of right rotator cuff, unspecified whether traumatic M75.111

 

                [facility]  [place]  [facility] [id number] [address]      

 

                [facility]  [place]  [facility] [place] [id number] [place] [address]     

Rotator cuff tear M75.100 ; Type 2 diabetes mellitus with diabetic 
polyneuropathy, without long-term current use of insulin E11.42 ; High risk 
medication use Z79.899 and Urge incontinence N39.41

 

                [facility]  [place]  [facility] [place] [address]     

Dysuria R30.0

 

                [facility]  [place]  [facility] [place] [address]      

 

                [facility]  [place]  [id number] [place] [address]      

 

                [facility]  [place]  [id number] [place] [address]      

 

                [facility]  [place]  [id number] [place] [address]      

 

                [facility]  [place]  [facility] [place] [address]     

Lumbago M54.5

 

                [facility]  [place]  [facility] [place] [address]      

 

                [facility]  [place]  [id number] [place] [address] 10

 Yung, 2020     

 

                [facility]  [place]  [facility] [place] [address] 08

 2020     

 

                [facility]  [place]  [address] [id number] [place] [address]      

 

                [facility]  [place]  [facility] [place] [address]      

 

                [facility]  [place]  [facility] [place] [address]     

Incomplete tear of right rotator cuff, unspecified whether traumatic M75.111 ; 
Essential (primary) hypertension I10 and Thyroid nodule E04.1

 

                    [facility] [address] [facility] [place] [address]                                 

 

                [facility]  [place] [address] [facility] [place] [address] 31

 Dec, 2019     

 

                [facility]  [place]  [facility] [place] [address] 30

 Dec, 2019    COPD 

exacerbation J44.1

 

                [facility]  [place] [address] [place] [id number] [address] 27

 Dec, 2019     

 

                [facility]  [place] [address] [id number] [address] 26

 Dec, 2019     

 

                [facility]  [place] [address] [id number] [address] 20

 Dec, 2019    

Nausea R11.0 ; Incomplete tear of right rotator cuff, unspecified whether 
traumatic M75.111 and Essential (primary) hypertension I10

 

                [facility]  [place] [address] [id number] [address] 20

 Dec, 2019     

 

                [facility]  [place] [address] [id number] [address] 18

 Dec, 2019     

 

                [facility]  [place] [address] [id number] [address] 16

 Dec, 2019     

 

                [facility]  [place] [address] [id number] [address] 11

 Dec, 2019    

Rotator cuff tear M75.100

 

                [facility]  [place]  [facility] [place] [address] 09

 Dec, 2019     

 

                [facility]  [place] [address] [id number] [address] 05

 Dec, 2019    

Essential (primary) hypertension I10 ; Type 2 diabetes mellitus with diabetic 
polyneuropathy, without long-term current use of insulin E11.42 ; Rotator cuff 
tear M75.100 and Chronic kidney disease, stage 3 N18.3

 

                [facility]  [place] [address] [id number] [address] 03

 Dec, 2019     

 

                [facility]  [place] [address] [id number] [address] 02

 Dec, 2019     

 

                [facility]  [place] [address] [id number] [address] 02

 Dec, 2019     

 

                [facility]  [place] [address] [place] [address] [place] [id number] [place] [address]      

 

                [facility]  [place] [address] [id number] [address]      

 

                [facility]  [place] [address] [place] [address] [place] [id number] [address]      

 

                [facility]  [place] [address] [id number] [address]     

Rotator cuff tear M75.100

 

                [facility]  [place] [address] [place] [address] [place] [id number] [address]      

 

                [facility]  [place] [address] [id number] [address]     

Rotator cuff tear M75.100 ; Major depressive disorder with current active 
episode, unspecified depression episode severity, unspecified whether recurrent 
F32.9 ; Type 2 diabetes mellitus with diabetic polyneuropathy, without long-term
current use of insulin E11.42 and Essential (primary) hypertension I10

 

                [facility]  [place] [address] [id number] [address]      

 

                [facility]  [place] [address] [id number] [address]      

 

                [facility]  [place] [address] [id number] [address] 31

 Oct, 2019     

 

                [facility]  [place] [address] [id number] [address] 29

 Oct, 2019     

 

                [facility]  [place] [address] [id number] [address] 21

 Oct, 2019    

Rotator cuff tear M75.100 ; Essential (primary) hypertension I10 and COPD mixed 
type J44.9

 

                [facility]  [place] [address] [place] [address] [place] [id number] [address] 18

 Oct, 2019    

Rotator cuff tear M75.100

 

                [facility]  [place] [address] [place] [address] [place] [id number] [place] [address] 15

 Oct, 2019     

 

                [facility]  [place] [address] [place] [address] [place] [id number] [address] 11

 Oct, 2019    

Preprocedural examination Z01.818

 

                [facility]  [place] [address] [place] [address] [place] [id number] [place] [address] 09

 Oct, 2019     

 

                [facility]  [place] [address] [id number] [address] 09

 Oct, 2019     

 

                [facility]  [place] [address] [id number] [address] 08

 Oct, 2019    

Traumatic complete tear of right rotator cuff, initial encounter S46.011A ; 
Essential (primary) hypertension I10 ; Encounter for immunization Z23 ; Leg 
cramps R25.2 ; COPD mixed type J44.9 and Fatigue R53.83

 

                [facility]  [place] [address] [id number] [address] 24

 Sep, 2019     

 

                [facility]  [place] [address] [id number] [address] 18

 Sep, 2019     

 

                [facility]  [place] [address] [id number] [address] 16

 Sep, 2019     

 

                [facility]  [place] [address] [id number] [address] 13

 Sep, 2019     

 

                [facility]  [place] [address] [id number] [address] 12

 Sep, 2019    

Traumatic complete tear of right rotator cuff, initial encounter S46.011A ; Left
lower quadrant abdominal pain R10.32 ; Essential (primary) hypertension I10 ; 
COPD mixed type J44.9 and Long term use of drug Z79.899

 

                [facility]  [place] [address] [place] [address] ST [id number] [place] [address] 10

 Sep, 2019     

 

                [facility]  [place] [address] [id number] [address] 09

 Sep, 2019    

Gastroenteritis and colitis, viral A08.4

 

                [facility]  [place] [address] [id number] [address] 04

 Sep, 2019     

 

                [facility]  [place] [address] [place] [address] ST [id number] [place] [address] 30

 Aug, 2019    

Traumatic complete tear of right rotator cuff, initial encounter S46.011A ; 
Chronic obstructive pulmonary disease, unspecified COPD type J44.9 and Mixed 
incontinence N39.46

 

                [facility]  [place] [address] [place] [address] [place] [id number] [place] [address] 29

 Aug, 2019     

 

                [facility]  [place] [address] [place] [address] [id number] [address] 27

 Aug, 2019     

 

                [facility]  [place] [address] [place] [address] [place] [id number] [address] 24

 Aug, 2019    

Injury of right shoulder, initial encounter S49.91XA

 

                [facility]  [place] [address] [place] [address] [place] [id number] [place] [address] 05

 Aug, 2019     

 

                [facility]  [place] [address] [place] [address] [place] [id number] [place] [address] 03

 Aug, 2019     

 

                [facility]  [place] [address] [place] [address] [place] [id number] [place] [address]      

 

                [facility]  [place] [address] [place] [address] [id number] [address]      

 

                [facility]  [place] [address] [id number] [address]     

Essential (primary) hypertension I10 ; Type 2 diabetes mellitus with diabetic 
polyneuropathy, without long-term current use of insulin E11.42 ; COPD mixed 
type J44.9 ; Thyroid nodule E04.1 and Degenerative disc disease, cervical M50.30

 

                [facility]  [place] [address] [place] [address] [id number] [address]      

 

                [facility]  [place] [address] [place] [address] [place] [id number] [place] [address]      

 

                [facility]  [place] [address] [place] [address] [place] [id number] [place] [address]      

 

                [facility]  [place] [address] [address] 24

 2019    

Gastroenteritis and colitis, viral A08.4

 

                [facility] [address] [place] [address] 22

 2019    

Chronic obstructive pulmonary disease, unspecified COPD type J44.9

 

                [facility] [address] [place] [address], [place] [address] 18

 2019    

Gastric reflux K21.9

 

                [facility]  [address] [address] 18

 2019     

 

                [facility] [address] [place] [address] 14

 2019    

Essential (primary) hypertension I10

 

                [facility] [address] [place] [address] 13

 2019     

 

                [address] [address] 13

 2019    

Chronic obstructive pulmonary disease, unspecified COPD type J44.9 ; Type 2 
diabetes mellitus with diabetic polyneuropathy, without long-term current use of
insulin E11.42 ; Essential (primary) hypertension I10 ; Gastro-esophageal reflux
disease without esophagitis K21.9 and Varicella vaccination Z23

 

                [facility] [address] [place] [id number] [address] 05

 2019    

Dyspnea on exertion R06.09 and Infiltrate noted on imaging study R93.89

 

                [facility] [address] [place], [place] [id number] [address] 28

 May, 2019    

Thyroid pain E07.89

 

                [facility] [address] [place] [id number] [address] 25

 May, 2019    

Thyroid pain E07.89

 

                [facility] [address] [place] [address] 03

 May, 2019    Other

schizoaffective disorders F25.8

 

                [facility] [address] [place] [id number] [address]     Type 

2 diabetes mellitus with diabetic polyneuropathy, without long-term current use 
of insulin E11.42 ; Gastric reflux K21.9 and Diabetic retinopathy of right eye 
associated with type 2 diabetes mellitus, macular edema presence unspecified, 
unspecified retinopathy severity E11.319

 

                [facility]  [place] [address] [id number] [address] 20

 Mar, 2019     

 

                [facility]  [place] [address] [place] [id number] [address] 19

 Mar, 2019    Type 

2 diabetes mellitus with diabetic polyneuropathy, without long-term current use 
of insulin E11.42 and Gastric reflux K21.9

 

                [facility]  [place] [address] [place] [id number] [address] 18

 Mar, 2019     

 

                [facility]  [place] [address] [id number] [address] 12

 Mar, 2019    Acute

vaginitis N76.0 and Other schizoaffective disorders F25.8

 

                    [facility] [address] [facility] [place] [address] 08 

Mar, 2019                                

 

                [facility]  [place] [address] [place] [id number] [place] [address] 01

 Mar, 2019     

 

                [facility]  [place] [address] [place] [id number] [address]      

 

                [facility] [address] [place] [id number] [address]      

 

                [facility]  [place] [address] [id number] [address]      

 

                [facility]  [place] [place] [address]     

Dental examination Z01.20 and Caries K02.9

 

                [facility]  [place] [address] [id number] [address]     Type 

2 diabetes mellitus without complications E11.9

 

                [facility]  [place] [address] [id number] [address]      

 

                [facility]  [place] [address] [place] [id number] [address]     

Migraine without aura and without status migrainosus, not intractable G43.009

 

                [facility]  [place] [address] [id number] [address]      

 

                [facility]  [place] [address] [id number] [address]      

 

                [facility]  [place] [address] [id number] [address]      

 

                [facility]  [place] [address] [id number] [address] 09

 2019    

Shortness of breath R06.02

 

                [facility]  [place] [address] [id number] [address] 07

 2019    

Dental examination Z01.20

 

                [facility]  [place] [address] [id number] [address] 05

 2019    Acute

intractable headache, unspecified headache type R51 and BMI 40.0-44.9, adult 
Z68.41

 

                [facility]  [place] [address] [id number] [address]      

 

                [facility]  [place] [address] [id number] [address]      

 

                [facility]  [place] [address] [id number] [address]      

 

                [facility]  [place] [address] [id number] [address] 31

 Dec, 2018     

 

                [facility]  [place] [address] [id number] [address] 20

 Dec, 2018     

 

                [facility]  [place] [address] [id number] [address] 20

 Dec, 2018     

 

                [facility]  [place] [address] [id number] [address] 18

 Dec, 2018    

Impetigo L01.00 and Sore in nose J34.89

 

                [facility]  [place] [address] [place] [address] [id number] [place] [address] 17

 Dec, 2018    

Degenerative disc disease, cervical M50.30

 

                [facility]  [place] [address] [place] [address] [place] [id number] [place] [address] 11

 Dec, 2018    Sore 

in nose J34.89

 

                [facility]  [facility] [address] [place], [place] [id number] [address] 11

 Dec, 2018    Sore 

in nose J34.89 ; Excoriation of abdomen, initial encounter S30.811A ; Encounter 
for immunization Z23 and BMI 40.0-44.9, adult Z68.41

 

                [facility]  [facility] [address] [place] [address] [id number] [address]      

 

                [facility]  IOL[address] [place] [address]      

 

                [facility]  [facility] [place] [address]     Type 

2 diabetes mellitus without complications E11.9

 

                [facility]  [place] [address] [place] [address] ST [id number] [place] [address]      

 

                [facility]  [place] [address] [place], [place] [id number] [address]     

Degenerative disc disease, cervical M50.30

 

                [facility]  [facility] [address] [place], [place] [id number] [address]     

Cervicalgia M54.2

 

                [facility]  [place] [address] [place] [address] [place] [id number] [address]      

 

                [facility]  [place] [address] [place] [address] [place] [id number] [address]      

 

                [facility]  [place] [address] [place] [id number] [address]      

 

                [facility]  [place] [address] [place] [id number] [address]     

Gastroenteritis and colitis, viral A08.4

 

                [facility]  [place] [id number] [place] [address] 22

 Oct, 2018    Type 

2 diabetes mellitus without complications E11.9 ; Degenerative disc disease, 
cervical M50.30 ; Essential (primary) hypertension I10 ; Gastro-esophageal 
reflux disease without esophagitis K21.9 and BMI 40.0-44.9, adult Z68.41

 

                [facility] [address] [place] [id number] [address] 26

 Sep, 2018    

Degenerative disc disease, cervical M50.30 ; Type 2 diabetes mellitus without 
complications E11.9 ; Encounter for immunization Z23 ; Long term current use of 
insulin Z79.4 and BMI 40.0-44.9, adult Z68.41

 

                [facility] [address] [place] [address], [place] [address] 20

 Sep, 2018    

Degenerative cervical disc M50.30

 

                [facility] [address] [place] [address] 19

 Sep, 2018     

 

                [facility] [address] [place] [address], [place] [address] 18

 Sep, 2018    

Generalized pruritus L29.9 ; Lumbar degenerative disc disease M51.36 and BMI 
40.0-44.9, adult Z68.41

 

                [facility] [address] [place] [address], [place] [address] 06

 Sep, 2018     

 

                [facility] [address] [place] [address] 04

 Sep, 2018    

Cervicalgia M54.2 and Essential (primary) hypertension I10

 

                [facility] [address] [place] [address], [place] [address] 27

 Aug, 2018    Type 

2 diabetes mellitus without complications E11.9

 

                [facility] [address] [place] [address], [place] [address] 30

 2018    

Gastroenteritis and colitis, viral A08.4 and BMI 40.0-44.9, adult Z68.41

 

                [facility] [address] [place] [id number] [address] 16

 2018    Type 

2 diabetes mellitus without complications E11.9 ; Essential (primary) 
hypertension I10 ; Screening for breast cancer Z12.31 ; Degenerative lumbar disc
M51.36 ; BMI 40.0-44.9, adult Z68.41 and Morbid obesity E66.01

 

                [address] [address] 26 , 

18    BMI 40.0-44.9, 

adult Z68.41 and Other schizoaffective disorders F25.8

 

                [address] [address] , 

18    Other 

schizoaffective disorders F25.8 ; Lumbar degenerative disc disease M51.36 and 
BMI 40.0-44.9, adult Z68.41

 

                [address] [address] 21 May, 

18    Shortness of 

breath R06.02

 

                [address] [address] 21 May, 20

18     

 

                [address] [address] 15 May, 20

18     

 

                [address] [address] 14 May, 20

18     

 

                [address] [address] 12 May, 20

18     

 

                [address] [address] 08 May, 20

18    Vaginal 

bleeding N93.9

 

                [address] [address] 01 May, 20

18    BMI 40.0-44.9, 

adult Z68.41 and Vaginal bleeding N93.9

 

                [address] [address] , 

18    Dysfunction of 

both eustachian tubes H69.83

 

                [address] [address] 10 Apr, 

18    BMI 40.0-44.9, 

adult Z68.41 ; Essential (primary) hypertension I10 and Impetigo L01.00

 

                [address] [place] [address] 27 Mar, 20

18     

 

                [address] [address] 26 Mar, 20

18    Type 2 diabetes

mellitus with diabetic polyneuropathy, without long-term current use of insulin 
E11.42 ; BMI 40.0-44.9, adult Z68.41 ; Other schizoaffective disorders F25.8 ; 
Gastro-esophageal reflux disease without esophagitis K21.9 ; Essential (primary)
hypertension I10 and Impetigo L01.00

 

                [address] [address] 16 Mar, 20

18    Type 2 diabetes

mellitus with diabetic polyneuropathy, without long-term current use of insulin 
E11.42

 

                [address] [address] 

18     

 

                [address] [address] 

18     

 

                [address] [address] 

18    Type 2 diabetes

mellitus with diabetic polyneuropathy, without long-term current use of insulin 
E11.42 ; Other schizoaffective disorders F25.8 ; Gastro-esophageal reflux 
disease without esophagitis K21.9 ; Essential (primary) hypertension I10 and 
Impetigo L01.00

 

                [address] [address] 13 Dec, 20

17    Shortness of 

breath R06.02 ; Type 2 diabetes mellitus without complications E11.9 ; BMI 40.0-
44.9, adult Z68.41 and Pre-ulcerative corn or callous L84

 

                [address] [address] 06 Dec, 20

17     

 

                [address] [address] 

17    Medicare 

welcome exam Z00.00 ; BMI 40.0-44.9, adult Z68.41 ; Medicare annual wellness 
visit, initial Z00.00 ; Medicare annual wellness visit, subsequent Z00.00 and 
Encounter for immunization Z23

 

                [facility] [place]   [address] [place] [address] , 

17    Foot callus L84

and Type 2 diabetes mellitus without complications E11.9

 

                [facility] [place]   [address] [place] [address] 30 Oct, 

17     

 

                [facility] [place]   [address] [place] [address] 09 Oct, 

17    Encounter for 

immunization Z23

 

                [facility]   [address] [place] [address] 06 Sep, 

17    Type 2 diabetes

mellitus without complications E11.9 and Polyneuropathy associated with 
underlying disease G63

 

                [facility] [place]   [address] [place] [address] 31 Aug, 

17    Edema R60.9 ; 

Type 2 diabetes mellitus without complications E11.9 and Anemia, unspecified 
type D64.9

 

                [facility]   [address] [place] [address] 23 Aug, 

17    Hip pain, left 

M25.552

 

                [facility] [place]   [address] [place] [address] 03 Aug, 

17     

 

                [facility] [place]   [address] [place] [address] , 

17     

 

                [facility] [place]   [address] [place] [address] , 

17     

 

                [facility] [place]   [address] [place] [address] 15 , 

17     

 

                [facility] [place]   [address] [place] [address] 14 , 

17     

 

                [facility] [place]   [address] [place] [address] 13 , 

17     

 

                [facility] [place]   [address] [place] [address] 12 , 

17     

 

                [facility] [place]   [address] [place] [address] 08 , 

17     

 

                [facility] [place]   [address] [place] [address] 06 , 

17    Type 2 diabetes

mellitus without complications E11.9 and Essential (primary) hypertension I10

 

                [facility] [place]   [address] [place] [address] 22 May, 20

17     

 

                [address] [address] 16 May, 20

17    Frequency of 

micturition R35.0 ; Dysuria R30.0 and Type 2 diabetes mellitus without 
complications E11.9

 

                [facility] [place]   [address] [place] [address] 11 Apr, 

17     

 

                [facility] [address] [address] 11 Apr, 

17     

 

                [facility] [address] [address] 

17     

 

                [facility] [address] [address] , 

17     

 

                [address] [address] 08 Mar, 20

17    Cellulitis of 

head except face L03.811

 

                [address] [address] , 

17     

 

                [address] [address] , 

17     

 

                [address] [address] 

17    Flu-like 

symptoms R68.89 and Influenza B J10.1

 

                [address] [address] 

17    Type 2 diabetes

mellitus without complications E11.9

 

                [facility] [address] [address] 

17     

 

                [address] [address] 10 Yung, 20

17    Type 2 diabetes

mellitus without complications E11.9 ; Anemia, unspecified type D64.9 ; 
Essential (primary) hypertension I10 and Fatigue R53.83

 

                [address] [address] 

17    Type 2 diabetes

mellitus without complications E11.9 and Foot callus L84

 

                [address] [address] 22 Dec, 20

16     

 

                [address] [address] 05 Dec, 20

16     

 

                [address] [address] 

16     

 

                [address] [address] 

16    Shortness of 

breath R06.02

 

                [address] [address] 

16    Shortness of 

breath R06.02

 

                [address] [address] 06 Oct, 20

16    Shortness of 

breath R06.02

 

                [address] [address] 19 Aug, 20

16    Screening for 

cervical cancer Z12.4 ; Edema R60.9 ; Screening for breast cancer Z12.39 and 
Screening for colon cancer Z12.11

 

                [address] [address] 12 Aug, 20

16    Blood in stool 

K92.1

 

                [address] [address] 11 Aug, 20

16    Anemia, 

unspecified type D64.9

 

                [address] [address] 09 Aug, 20

16    Edema R60.9 ; 

Shortness of breath R06.02 ; Anemia, unspecified type D64.9 and Gastroesophageal
reflux disease without esophagitis K21.9

 

                [address] [address] 05 Aug, 20

16    Shortness of 

breath R06.02 and Edema R60.9

 

                [address] [address] 04 Aug, 20

16     

 

                [address] [address] 04 Aug, 20

16    Shortness of 

breath R06.02 and Edema R60.9

 

                [facility] [place]   [address] [place] [address] 01 Aug, 20

16     

 

                    [facility] [address] [facility] [place] [address]                                Shortness of breath R06.02

 

                [facility] [place]   [address] [place] [address] 11 

16    Shortness of 

breath R06.02 ; Edema R60.9 and Gastroesophageal reflux disease without 
esophagitis K21.9

 

                [facility] [place]   [address] [place] [address] 

16    Shortness of 

breath R06.02

 

                [facility] [place]   [address] [place] [address] 

16     

 

                [facility] [address] [address] , 

16    Dental 

examination Z01.20

 

                [facility]   [address] [place] [address] 10 , 

16     

 

                [facility] [address] [address] 10 

16     

 

                [address] [address] 09 

16    Edema R60.9 ; 

Type 2 diabetes mellitus without complications E11.9 and Fatigue R53.83

 

                [address] [address] 18 May, 20

16    Edema R60.9 and

Abdominal muscle strain, initial encounter S39.011A

 

                [facility]   [address] [place] [address] 

16     

 

                [facility] [address] [address] 

16    Hip pain, right

M25.551 and Edema R60.9

 

                [facility]   [address] [place] [address] 08 

16     

 

                [facility] [address] [address] 

16    Edema R60.9 and

Impetigo L01.00

 

                [facility] [place]   [address] [place] [address] 

16    Type 2 diabetes

mellitus without complications E11.9 and Edema R60.9

 

                [facility] [place]   [address] [place] [address] 

16     

 

                [facility] [address] [address] 

16    Contusion of 

thigh, right S70.11XA

 

                [facility]   [address] [place] [address] 

16    Hip pain, right

M25.551

 

                [address] [address] 

16     

 

                [facility] [address] [address] 23 Dec, 20

15    Left hip pain 

M25.552

 

                [address] [address] 04 Dec, 20

15    Acute maxillary

sinusitis, recurrence not specified J01.00 and Vomiting, nausea presence 
unspecified, unspecified intactability, vomiting of unspecified type R11.10

 

                [address] [address] 01 Dec, 20

15    Acute maxillary

sinusitis, recurrence not specified J01.00

 

                [address] [address] 08 Oct, 20

15    Acquired 

spondylolisthesis of lumbosacral region M43.17 ; Encounter for immunization Z23 
and Cholelithiasis K80.20

 

                [address] [address] 28 Sep, 20

15     

 

                [facility] [address] [address] 24 Sep, 20

15    Lumbar 

radiculopathy 724.4

 

                [facility] [address] [address] 21 Sep, 20

15     

 

                [facility] [address] [address] 27 Aug, 20

15    Esophageal 

reflux 530.81

 

                [address] [address] 20 Aug, 20

15    Esophageal 

reflux 530.81 ; Essential hypertension, benign 401.1 and Diabetes mellitus 
without mention of complication, type II or unspecified type, not stated as 
uncontrolled 250.00

 

                [address] [address] 07 Aug, 20

15     

 

                [address] [address] 05 Aug, 20

15    Myalgia and 

myositis 729.1

 

                [address] [address] 

15    Upper 

respiratory infection 465.9 ; Other symptoms involving skin and integumentary 
tissues 782.9 and Factitial dermatitis 698.4

 

                [address] [address] 

15    Factitial 

dermatitis 698.4

 

                [address] [address] 

15     

 

                [address] [address] 

15    Esophageal 

reflux 530.81

 

                [address] [address] 

15     

 

                [address] [address] 

15    Esophageal 

reflux 530.81

 

                [address] [address] 27 May, 20

15     

 

                [address] [address] 22 May, 20

15    Dyspepsia 536.8

and Epigastric pain 789.06

 

                [address] [address] 05 May, 20

15     

 

                [address] [address] 04 May, 20

15     

 

                [address] [address] 04 May, 20

15    Impetigo 684

 

                [facility] [address] 

15    Other symptoms 

involving skin and integumentary tissues 782.9 and Seborrheic keratosis 702.19

 

                    [facility] [address] [address] 14 

2015                                

 

                    [facility] [address] [address]                                 

 

                [facility] [place] [address] 20 Mar, 20

15     

 

                    [facility] [address] [address] 20 

Mar, 2015                                

 

                [facility] [place] [address] 18 Mar, 20

15     

 

                    [facility] [address] [address] 18 

Mar, 2015                                

 

                [facility] [place] [address] 16 Mar, 20

15     

 

                    [facility] [address] [address] 16 

Mar, 2015                                

 

                [facility] [place]   [address] [place] [address] 05 Mar, 20

15     

 

                    [facility] [address] [address] 05 

Mar, 2015                                

 

                [facility] [place] [address] 

15     

 

                    [facility] [address] [place] [address]                                 

 

                [facility] [address] 

15     

 

                    [facility] [address] [address]                                 

 

                [facility] [address] 08 Dec, 

14     

 

                    [facility] [address] [facility] [place] [address] 08 

Dec, 2014                                

 

                [facility] [place]    [address] [facility] [place] [address] , 

14     

 

                    [facility] [address] [facility] [place] [address]                                 

 

                [facility] [place]    [address] [facility] [place] [address] 08 Oct, 

14     

 

                    [facility] [address] [facility] [place] [address] 08 

Oct, 2014                                

 

                [facility] [place]    [facility] [place] [address] 02 Oct, 20

14     

 

                    [facility] [address] [facility] [place] [address] 02 

Oct, 2014                                

 

                    [facility] [address] [place] [address] [place] [address] 12 

Sep, 2014                                

 

                [facility] [place] [address] 09 Sep, 

14     

 

                    [facility] [address] [place] [address] [place] [address] 09 

Sep, 2014                                

 

                    [facility] [address] [facility] [place] [address] 03 

Sep, 2014                                

 

                [facility] [place] [address] 03 Sep, 

14     

 

                [facility] [place] [address] 

14     

 

                    [facility] [address] [facility] [place] [address]                                 

 

                [facility] [place] [address] 02 Mar, 

14     

 

                    [facility] [address] [facility] [place] [address] 02 

Mar, 2014                                

 

                [facility] [place] [address] 

14     

 

                    [place] [address] [address]                                 

 

                    [place] [address] [address]                                 

 

                [facility]   [address] [place] [address] 

14     

 

                    [place] [address] [address]                                 

 

                [facility] [place] [address] 

14     

 

                    [place] [address] [address]                                 

 

                [facility] [place] [address] 

13     

 

                    [place] [address] [address]                                 

 

                [facility] [place]   [address] [place] [address] 

13     

 

                    [place] [address] [address]                                 

 

                [address] [address] 

13     

 

                    [address] [address]                                 







IMMUNIZATIONS

No Known Immunizations



SOCIAL HISTORY

Never Assessed



REASON FOR VISIT





PLAN OF CARE





VITAL SIGNS





MEDICATIONS

Unknown Medications



RESULTS

No Results



PROCEDURES

No Known procedures



INSTRUCTIONS





MEDICATIONS ADMINISTERED

No Known Medications



MEDICAL (GENERAL) HISTORY





                    Type                Description         Date

 

                    Medical History     Other symptoms involving skin and integu

mentary tissues  

 

                    Medical History     Impetigo             

 

                    Medical History     Schizoaffective disorder, unspecified  

 

                    Medical History     Essential hypertension, benign  

 

                          Medical History           Diabetes mellitus without me

ntion of complication, type II or 

unspecified type, not stated as uncontrolled  

 

                    Medical History     Urinary frequency    

 

                    Medical History     Encounter for long-term (current) use of

 other medications  

 

                    Medical History     Esophageal reflux    

 

                    Medical History     DIABETES TYPE II     

 

                    Medical History     HIGH BLOOD PRESSURE- pt states sometimes

 it goes low  

 

                    Medical History     BACK TROUBLE         

 

                    Surgical History    cholecystectomy      

 

                    Surgical History    heart cath          2019

 

                    Hospitalization History Surgery(s) only      

 

                          Hospitalization History   possible pneumonia or fluid 

on around heart-sent to [facility] 

heart/thinking COPD                     2019

## 2020-04-10 NOTE — XMS REPORT
[facility]

                             Created on: 2020



Radha Hardin

External Reference #: [id number]

: 1954

Sex: Female



Demographics





                          Address                   [address] [place] [address]  [address]

 

                          Preferred Language        Unknown

 

                          Marital Status            Unknown

 

                          Mormonism Affiliation     Unknown

 

                          Race                      Unknown

 

                          Ethnic Group              Unknown





Author





                          Author                    Radha Smith Doctor

 

                          Organization              [facility]

 

                          Address                   Unknown

 

                          Phone                     Unavailable







Care Team Providers





                    Care Team Member Name Role                Phone

 

                    Migration,  Doctor  Unavailable         Unavailable







PROBLEMS





          Type      Condition ICD9-CM Code QWU70-SN Code Onset Dates Condition S

tatus SNOMED 

Code

 

          Problem   Degenerative disc disease, cervical           M50.30        

      Active    99555265

 

          Problem   Lumbar degenerative disc disease           M51.36           

   Active    05514896

 

          Problem   Gastric reflux           K21.9               Active    96074

7003

 

                Problem         Migraine without aura and without status migrain

osus, not intractable                 

G43.009                                 Active              [id number]

 

          Problem   COPD mixed type           J44.9               Active    1364

5005

 

          Problem   Thyroid nodule           E04.1               Active    53969

5005

 

          Problem   Mixed incontinence           N39.46              Active    4

73649832

 

          Problem   Essential (primary) hypertension           I10              

   Active    21554586

 

          Problem   Left lower quadrant abdominal pain           R10.32         

     Active    150823633

 

          Problem   Chronic kidney disease, stage 3           N18.3             

  Active    402461858

 

          Problem   COPD exacerbation           J44.1               Active    19

4527432

 

          Problem   Urge incontinence           N39.41              Active    87

977956

 

          Problem   Complete tear of right rotator cuff           M75.121       

      Active    580690890

 

                Problem         Incomplete tear of right rotator cuff, unspecifi

ed whether traumatic                 

M75.111                                 Active              059022772

 

          Problem   Other schizoaffective disorders           F25.8             

  Active    38846219

 

          Problem   Hypertensive heart disease with heart failure           I11.

0               Active    85922765

 

          Problem   Rotator cuff tear           M75.100             Active    41

97384

 

                          Problem                   Type 2 diabetes mellitus wit

h diabetic polyneuropathy, without long-term

current use of insulin              E11.42                    Active       05672

006

 

          Problem   Gastro-esophageal reflux disease without esophagitis        

   K21.9               Active    

466095878

 

          Problem   GERD with esophagitis           K21.0               Active  

  107395609

 

          Problem   Major depression           F32.9               Active    370

239554

 

           Problem    Moderate episode of recurrent major depressive disorder   

         F33.1                 Active

                                        448933777

 

                          Problem                   Atherosclerosis of native co

ronary artery with angina pectoris, 

unspecified whether native or transplanted heart              I25.119           

        Active       

[id number]







ALLERGIES

No Information



ENCOUNTERS





                Encounter       Location        Date            Diagnosis

 

                    [facility] [address] [id number] [place] 

[address] 

                                         

 

                    [facility] [place]     [address] [id number] [place] 

[address] 27 Mar, 2020

                                         

 

                    [facility] [place]     [address] [id number] [place] 

[address] 26 Mar, 2020

                                         

 

                    [facility] [place]     [place] [id number] [place] 

[address] 26 Mar, 2020

                                         

 

                    [facility] [place]     [id number] [place] 

[address] 25 Mar, 2020

                                        Major depression F32.9

 

                    [facility]  [place]     [id number] 

[address] 25 Mar, 2020

                                        Encounter for Medicare annual wellness e

xam Z00.00 ; COPD mixed type J44.9 ; 

Essential (primary) hypertension I10 ; Type 2 diabetes mellitus with diabetic 
polyneuropathy, without long-term current use of insulin E11.42 ; 
Atherosclerosis of native coronary artery with angina pectoris, unspecified 
whether native or transplanted heart I25.119 ; Hypertensive heart disease with 
heart failure I11.0 ; Other schizoaffective disorders F25.8 ; Moderate episode 
of recurrent major depressive disorder F33.1 ; Chronic kidney disease, stage 3 
N18.3 ; Degenerative disc disease, cervical M50.30 ; Gastro-esophageal reflux 
disease without esophagitis K21.9 and Complete tear of right rotator cuff 
M75.121

 

                    [facility]  [place]     [place] [id number] 

[address] 25 Mar, 2020

                                         

 

                    [facility]  [place]     [id number] 

[address] 24 Mar, 2020

                                         

 

                    [facility] [place]     [id number] 

[address] 24 Mar, 2020

                                         

 

                    [facility]  [place]     [id number] 

[address] 24 Mar, 2020

                                         

 

                    [facility]  [place]     [id number] 

[address] 23 Mar, 2020

                                         

 

                    [facility]  [place]     [address] [id number] [place] 

[address] 23 Mar, 2020

                                         

 

                    [facility]  [place]     [address] [id number] [place] 

[address] 20 Mar, 2020

                                         

 

                          [facility]     [address] [facility] [id number]

[facility] [address]

                          19 Mar, 2020               

 

                    [facility]  [place]     [address] [id number] [place] 

[address] 19 Mar, 2020

                                         

 

                    [facility]  [place]     [address] [id number] [place] 

[address] 18 Mar, 2020

                                         

 

                          [facility]     [address] [facility] [id number]

[facility] [address]

                          17 Mar, 2020               

 

                    [facility]  [place]     [place] [id number] 

[address] 17 Mar, 2020

                                         

 

                    [facility]  [place]     [id number] 

[address] 16 Mar, 2020

                                         

 

                    [facility]  [place]     [id number] [place] 

[address] 16 Mar, 2020

                                         

 

                    [facility]  [place]     [id number] [place] 

[address] 13 Mar, 2020

                                        Varicose veins of both lower extremities

, unspecified whether complicated I83.93

 

                    [facility]  [place]     [id number] [place] 

[address] 10 Mar, 2020

                                        Major depression F32.9

 

                    [facility]  [place]     [id number] [place] 

[address] 06 Mar, 2020

                                        GERD with esophagitis K21.0 ; Other schi

zoaffective disorders F25.8 ; Type 2 

diabetes mellitus with diabetic polyneuropathy, without long-term current use of
insulin E11.42 ; COPD mixed type J44.9 and Post-menopausal Z78.0

 

                    [facility]  [place]     [id number] 

[address] 04 Mar, 2020

                                        Other schizoaffective disorders F25.8 an

d Type 2 diabetes mellitus with diabetic

polyneuropathy, without long-term current use of insulin E11.42

 

                          [facility]     [address] [facility] [id number]

[address] [address]

                          03 Mar, 2020               

 

                    [facility]  [place]     [address] [id number] [place] 

[address] 02 Mar, 2020

                                         

 

                    [facility]  [place]     [address] [place] 

[address] 28 2020

                                        Type 2 diabetes mellitus with diabetic p

olyneuropathy, without long-term current

use of insulin E11.42 ; Moderate episode of recurrent major depressive disorder 
F33.1 and Other schizoaffective disorders F25.8

 

                    [facility]  [place]     [address] [place] 

[address] 27 2020

                                         

 

                    [facility]  [place]     [address] [place] 

[address] 26 2020

                                         

 

                    [facility]  [place]     [address] [place] 

[address] 24 2020

                                         

 

                    [facility]  [place]     [address] [place] 

[address] 21 2020

                                        Incomplete tear of right rotator cuff, u

nspecified whether traumatic M75.111

 

                    [facility]  [place]     [id number] [place] 

[address] 20 2020

                                         

 

                    [facility]  [place]     [id number] [place] 

[address] 19 2020

                                         

 

                    [facility]  [place]     [id number] [place] 

[address] 18 2020

                                        Other schizoaffective disorders F25.8

 

                    [facility]  [place]     [id number] [place] 

[address] 13 2020

                                         

 

                    [facility]  [place]     [id number] [place] 

[address] 12 2020

                                         

 

                    [facility] [place]     [address] [id number] [place] 

[address] 11 Feb, 

                                         

 

                    [facility] [place]     [facility] [address] [id number] [place] 

[address] 10 Feb, 

                                         

 

                    [facility] [place]     [facility] [address] [id number] [place] 

[address] 07 Feb, 

                                         

 

                    [facility] [place]     [facility] [address] [id number] [place] 

[address] 05 2020

                                         

 

                    [facility] [place]     [facility] [address] [id number] [place] 

[address] 04 2020

                                         

 

                    [facility] [place]     [id number] [place] 

[address] 

                                         

 

                    [facility]  [place]     [id number] [place] 

[address] 

                                         

 

                    [facility]  [place]     [id number] [place] 

[address] 

                                        Incomplete tear of right rotator cuff, u

nspecified whether traumatic M75.111

 

                    [facility]  [place]     [address] [id number] [place] 

[address] 

                                         

 

                    [facility]  [place]     [address] [id number] [place] 

[address] 30 2020

                                        Rotator cuff tear M75.100 ; Type 2 diabe

jeffery mellitus with diabetic 

polyneuropathy, without long-term current use of insulin E11.42 ; High risk 
medication use Z79.899 and Urge incontinence N39.41

 

                    [facility]  [place]     [address] [id number] [place] 

[address] 

                                        Dysuria R30.0

 

                    [facility]  [place]     [address] [id number] [place] 

[address] 

                                         

 

                    [facility]  [place]     [id number] [place] 

[address] 

                                         

 

                    [facility]  [place]     [address] [id number] [place] 

[address] 

                                         

 

                    [facility]  [place]     [facility] [address] [id number] [place] 

[address] 

                                         

 

                    [facility]  [place]     [facility] [address] [id number] [place] 

[address] 

                                        Lumbago M54.5

 

                    [facility]  [place]     [facility] [address] [id number] [place] 

[address] 

                                         

 

                    [facility]  [place]     [facility] [address] [id number] [place] 

[address] 10 Yung, 2020

                                         

 

                    [facility]  [place]     [address] [id number] [place] 

[address] 

                                         

 

                    [facility]  [place]     [facility] [address] [id number] [place] 

[address] 

                                         

 

                    [facility]  [place]     [address] [id number] [place] 

[address] 

                                         

 

                    [facility]  [place]     [address] [id number] [place] 

[address] 

                                        Incomplete tear of right rotator cuff, u

nspecified whether traumatic M75.111 ; 

Essential (primary) hypertension I10 and Thyroid nodule E04.1

 

                          [facility]     [address] [facility] [id number]

[address] [address]

                                         

 

                    [facility]  [place]     [address] [id number] [place] 

[address] 31 Dec, 2019

                                         

 

                    [facility]  [place]     [address] [id number] [place] 

[address] 30 Dec, 2019

                                        COPD exacerbation J44.1

 

                    [facility]  [place]     [address] [id number] [place] 

[address] 27 Dec, 2019

                                         

 

                    [facility]  [place]     [address] [id number] [place] 

[address] 26 Dec, 2019

                                         

 

                    [facility]  [place]     [address] [id number] [place] 

[address] 20 Dec, 2019

                                        Nausea R11.0 ; Incomplete tear of right 

rotator cuff, unspecified whether 

traumatic M75.111 and Essential (primary) hypertension I10

 

                    [facility]  [place]     [facility] [address] [id number] [place] 

[address] 20 Dec, 2019

                                         

 

                    [facility]  [place]     [facility] [address] [id number] [place] 

[address] 18 Dec, 2019

                                         

 

                    [facility]  [place]     [address] [id number] [place] 

[address] 16 Dec, 2019

                                         

 

                    [facility]  [place]     [facility] [address] [id number] [place] 

[address] 11 Dec, 2019

                                        Rotator cuff tear M75.100

 

                    [facility]  [place]     [id number] [place] 

[address] 09 Dec, 2019

                                         

 

                    [facility]  [place]     [address] [place] 

[address] 05 Dec, 2019

                                        Essential (primary) hypertension I10 ; T

ype 2 diabetes mellitus with diabetic 

polyneuropathy, without long-term current use of insulin E11.42 ; Rotator cuff 
tear M75.100 and Chronic kidney disease, stage 3 N18.3

 

                    [facility]  [place]     [id number] 

[address] 03 Dec, 2019

                                         

 

                    [facility]  [place]     [id number] 

[address] 02 Dec, 2019

                                         

 

                    [facility]  [place]     [id number] [place] 

[address] 02 Dec, 2019

                                         

 

                    [facility]  [place]     [id number] 

[address] 

                                         

 

                    [facility]  [place]     [place] [id number] [place] 

[address] 

                                         

 

                    [facility]  [place]     [id number] 

[address] 

                                         

 

                    [facility]  [place]     [id number] 

[address] 

                                        Rotator cuff tear M75.100

 

                    [facility]  [place]    [address] [place] [id number] 

[address] 

                                         

 

                    [facility]  [place]    [address] [place] [id number] 

[address] 

                                        Rotator cuff tear M75.100 ; Major depres

sive disorder with current active 

episode, unspecified depression episode severity, unspecified whether recurrent 
F32.9 ; Type 2 diabetes mellitus with diabetic polyneuropathy, without long-term
current use of insulin E11.42 and Essential (primary) hypertension I10

 

                    [facility]  [place]    [address] [place] [id number] 

[address] 

                                         

 

                    [facility]  [place]    [address] [place] [id number] 

[address] 

                                         

 

                    [facility]  [place]    [address] [place], [place] [id number] 

[address] 31 Oct, 2019

                                         

 

                    [facility]  [place]    [address] [place], [place] [id number] 

[address] 29 Oct, 2019

                                         

 

                    [facility]  [place]    [address] [place], [place] [id number] 

[address] 21 Oct, 2019

                                        Rotator cuff tear M75.100 ; Essential (p

rimary) hypertension I10 and COPD mixed 

type J44.9

 

                    [facility]  [place]    [address] [place] [id number] 

[address] 18 Oct, 2019

                                        Rotator cuff tear M75.100

 

                    [facility]  [place]    [address] [place] [id number] 

[address] 15 Oct, 2019

                                         

 

                    [facility]  [place]    [address] [place] [id number] 

[address] 11 Oct, 2019

                                        Preprocedural examination Z01.818

 

                    [facility]  [place]    [address] [place] [id number] 

[address] 09 Oct, 2019

                                         

 

                    [facility]  [place]    [address] [place] [id number] 

[address] 09 Oct, 2019

                                         

 

                    [facility]  [place]    [address] [place] [id number] 

[address] 08 Oct, 2019

                                        Traumatic complete tear of right rotator

 cuff, initial encounter S46.011A ; 

Essential (primary) hypertension I10 ; Encounter for immunization Z23 ; Leg 
cramps R25.2 ; COPD mixed type J44.9 and Fatigue R53.83

 

                    [facility]  [place]    [address] [place] [address] ST [id number] [place] 

[address] 24 Sep, 2019

                                         

 

                    [facility]  [place]    [address] [place] [address] ST [id number] [place] 

[address] 18 Sep, 2019

                                         

 

                    [facility]  [place]    [address] [place], [place] [id number] 

[address] 16 Sep, 2019

                                         

 

                    [facility]  [place]    [address] [place], [place] [id number] 

[address] 13 Sep, 2019

                                         

 

                    [facility]  [place]    [address] [place], [place] [id number] 

[address] 12 Sep, 2019

                                        Traumatic complete tear of right rotator

 cuff, initial encounter S46.011A ; Left

lower quadrant abdominal pain R10.32 ; Essential (primary) hypertension I10 ; 
COPD mixed type J44.9 and Long term use of drug Z79.899

 

                    [facility]  [place]    [address] [place], [place] [id number] 

[address] 10 Sep, 2019

                                         

 

                    [facility]  [place]    [address] [place], [place] [id number] 

[address] 09 Sep, 2019

                                        Gastroenteritis and colitis, viral A08.4

 

                    [facility]  [place]    [address] [place], [place] [id number] 

[address] 04 Sep, 2019

                                         

 

                    [facility]  [place]    [address] [place], [place] [id number] 

[address] 30 Aug, 2019

                                        Traumatic complete tear of right rotator

 cuff, initial encounter S46.011A ; 

Chronic obstructive pulmonary disease, unspecified COPD type J44.9 and Mixed 
incontinence N39.46

 

                    [facility]  [place]    [address] [place], [place] [id number] 

[address] 29 Aug, 2019

                                         

 

                    [facility]  [place]     [place] [id number] 

[address] 27 Aug, 2019

                                         

 

                    [facility]  [place]    [address] [place] [id number] 

[address] 24 Aug, 2019

                                        Injury of right shoulder, initial encoun

ter S49.91XA

 

                    [facility]  [place]     [address] [id number] 

[address] 05 Aug, 2019

                                         

 

                    [facility]  [place]    [address] [place] [id number] 

[address] 03 Aug, 2019

                                         

 

                    [facility]  [place]    [address] [place] [id number] 

[address] 

                                         

 

                    [facility]  [place]     [address] 

[address] 

                                         

 

                    [facility]  [place]    [address] [place], [place] [id number] 

[address] 

                                        Essential (primary) hypertension I10 ; T

ype 2 diabetes mellitus with diabetic 

polyneuropathy, without long-term current use of insulin E11.42 ; COPD mixed 
type J44.9 ; Thyroid nodule E04.1 and Degenerative disc disease, cervical M50.30

 

                    [facility]  [place]     [place] [id number] 

[address] 

                                         

 

                    [facility]  [place]     [address] [id number] 

[address] 

                                         

 

                    [facility]  [place]     [address] [id number] 

[address] 

                                         

 

                    [facility]  [place]    [address] [place] [id number] 

[address] 

                                        Gastroenteritis and colitis, viral A08.4

 

                    [facility]  [place]     [place] [id number] 

[address] 

                                        Chronic obstructive pulmonary disease, u

nspecified COPD type J44.9

 

                    [facility]  [place]    [address] [place], [place] [id number] 

[address] 

                                        Gastric reflux K21.9

 

                    [facility]  [place]    [address] [place] [id number] 

[address] 18 2019

                                         

 

                    [facility]  [place]    [address] [place], [place] [id number] 

[address] 14 2019

                                        Essential (primary) hypertension I10

 

                    [facility]  [place]    [address] [place], [place] [id number] 

[address] 

                                         

 

                    [facility]  [place]    [address] [place] [id number] 

[address] 

                                        Chronic obstructive pulmonary disease, u

nspecified COPD type J44.9 ; Type 2 

diabetes mellitus with diabetic polyneuropathy, without long-term current use of
insulin E11.42 ; Essential (primary) hypertension I10 ; Gastro-esophageal reflux
disease without esophagitis K21.9 and Varicella vaccination Z23

 

                    [facility]  [place]    [address] [place], [place] [id number], [place] 

[address] 

                                        Dyspnea on exertion R06.09 and Infiltrat

e noted on imaging study R93.89

 

                    [facility]  [place]    [address] [place], [place] [id number] 

[address] 28 May, 2019

                                        Thyroid pain E07.89

 

                    [facility]  [place]    [address] [place] [id number] 

[address] 25 May, 2019

                                        Thyroid pain E07.89

 

                    [facility]  [place]    [address] [place] [id number] 

[address] 03 May, 2019

                                        Other schizoaffective disorders F25.8

 

                    [facility]  [place]    [address] [place] [id number] 

[address] 

                                        Type 2 diabetes mellitus with diabetic p

olyneuropathy, without long-term current

use of insulin E11.42 ; Gastric reflux K21.9 and Diabetic retinopathy of right 
eye associated with type 2 diabetes mellitus, macular edema presence 
unspecified, unspecified retinopathy severity E11.319

 

                    [facility]  [place]    [address] [place] [id number] [place] 

[address] 20 Mar, 2019

                                         

 

                    [facility]  [place]    [address] [place] [id number] 

[address] 19 Mar, 2019

                                        Type 2 diabetes mellitus with diabetic p

olyneuropathy, without long-term current

use of insulin E11.42 and Gastric reflux K21.9

 

                    [facility]  [place]    [address] [place] [id number] 

[address] 18 Mar, 2019

                                         

 

                    [facility]  [place]    [address] [place] [id number] 

[address] 12 Mar, 2019

                                        Acute vaginitis N76.0 and Other schizoaf

fective disorders F25.8

 

                          [facility]     [address] [id number]

[address] [place] [address]

                          08 Mar, 2019               

 

                    [facility]  [place]    [address] [place] [id number] 

[address] 01 Mar, 2019

                                         

 

                    [facility]  [place]    [address] [place] [id number] 

[address] 

                                         

 

                    [facility]  [place]    [address] [place] [id number] 

[address] 

                                         

 

                    [facility]  [place]     [address] 

[address] 

                                         

 

                    [facility]  [place]    [address] [place], [place] [id number], [place] 

[address] 

                                        Dental examination Z01.20 and Caries K02

.9

 

                    [facility]  [place]    [address] [place] [id number] 

[address] 

                                        Type 2 diabetes mellitus without complic

ations E11.9

 

                    [facility]  [place]    [address] [place] [id number] 

[address] 

                                         

 

                    [facility]  [place]     [place] [id number] 

[address] 

                                        Migraine without aura and without status

 migrainosus, not intractable G43.009

 

                    [facility]  [place]     [facility] [id number] [place] 

[address] 

                                         

 

                    [facility]  [place]     [place] [id number] 

[address] 

                                         

 

                    [facility]  [place]    [address] [place] [address] [id number] [place] 

[address] 

                                         

 

                    [facility]  [place]    [address] [place] [id number] 

[address] 

                                        Shortness of breath R06.02

 

                    [facility]  [place]    [address] [place] [address] [id number] [place] 

[address] 07 2019

                                        Dental examination Z01.20

 

                    [facility]  [place]     [place] [id number] 

[address] 05 2019

                                        Acute intractable headache, unspecified 

headache type R51 and BMI 40.0-44.9, 

adult Z68.41

 

                    [facility]  [place]     [facility] [id number] [place] 

[address] 

                                         

 

                    [facility]  [place]    [address] [place] [id number] 

[address] 

                                         

 

                    [facility]  [place]    [address] [place] [id number] 

[address] 

                                         

 

                    [facility]  [place]    [address] [place] [address] [id number] [place] 

[address] 31 Dec, 2018

                                         

 

                    [facility]  [place]     [place] [id number] 

[address] 20 Dec, 2018

                                         

 

                    [facility]  [place]    [address] [place] [address] [id number] [place] 

[address] 20 Dec, 2018

                                         

 

                    [facility]  [place]    [address] [place] [address] [id number] [place] 

[address] 18 Dec, 2018

                                        Impetigo L01.00 and Sore in nose J34.89

 

                    [facility]  [place]     [facility] [id number] [place] 

[address] 17 Dec, 2018

                                        Degenerative disc disease, cervical M50.

30

 

                    [facility]  [place]     [facility] [address] [id number] [place] 

[address] 11 Dec, 2018

                                        Sore in nose J34.89

 

                    [facility]  [place]     [facility] [address] [id number] [place] 

[address] 11 Dec, 2018

                                        Sore in nose J34.89 ; Excoriation of abd

omen, initial encounter S30.811A ; 

Encounter for immunization Z23 and BMI 40.0-44.9, adult Z68.41

 

                    [facility]  [place]    [address] [place] [address] ST [id number] [place] 

[address] 

                                         

 

                    [facility]  [place]     [facility] [address] [id number] [place] 

[address] 

                                         

 

                    [facility]  [place]    [address] [place] [address] ST [id number] [place] 

[address] 

                                        Type 2 diabetes mellitus without complic

ations E11.9

 

                    [facility]  [place]    [address] [place] [address] ST [id number] [place] 

[address] 

                                         

 

                    [facility]  [place]    [address] [place] [address] ST [id number] [place] 

[address] 

                                        Degenerative disc disease, cervical M50.

30

 

                    [facility]  [place]    [address] [place] [address] ST [id number] [place] 

[address] 

                                        Cervicalgia M54.2

 

                    [facility]  [place]    [address] [place] [address] ST [id number] [place] 

[address] 

                                         

 

                    [facility]  [place]    [address] [place] [address] ST [id number] [place] 

[address] 

                                         

 

                    [facility]  [place]     [facility] [address] [id number] [place] 

[address] 

                                         

 

                    [facility]  [place]    [address] [place] [address] ST [id number] [place] 

[address] 

                                        Gastroenteritis and colitis, viral A08.4

 

                    [facility]  [place]     [facility] [address] [id number] [place] 

[address] 22 Oct, 2018

                                        Type 2 diabetes mellitus without complic

ations E11.9 ; Degenerative disc 

disease, cervical M50.30 ; Essential (primary) hypertension I10 ; Gastro-
esophageal reflux disease without esophagitis K21.9 and BMI 40.0-44.9, adult 
Z68.41

 

                    [facility]  [place]    [address] [place], [place] [id number] 

[address] 26 Sep, 2018

                                        Degenerative disc disease, cervical M50.

30 ; Type 2 diabetes mellitus without 

complications E11.9 ; Encounter for immunization Z23 ; Long term current use of 
insulin Z79.4 and BMI 40.0-44.9, adult Z68.41

 

                    [facility]  [place]    [address] [place], [place] [id number] 

[address] 20 Sep, 2018

                                        Degenerative cervical disc M50.30

 

                    [facility]  [place]    [address] [place], [place] [id number] 

[address] 19 Sep, 2018

                                         

 

                    [facility]  [place]    [address] [place], [place] [id number] 

[address] 18 Sep, 2018

                                        Generalized pruritus L29.9 ; Lumbar dege

nerative disc disease M51.36 and BMI 

40.0-44.9, adult Z68.41

 

                    [facility]  [place]    [address] [place], [place] [id number] 

[address] 06 Sep, 2018

                                         

 

                    [facility]  [place]    [address] [place] [id number] 

[address] 04 Sep, 2018

                                        Cervicalgia M54.2 and Essential (primary

) hypertension I10

 

                    [facility]  [place]    [address] [place], [place] [id number] 

[address] 27 Aug, 2018

                                        Type 2 diabetes mellitus without complic

ations E11.9

 

                    [facility]  [place]    [address] [place] [id number] 

[address] 30 2018

                                        Gastroenteritis and colitis, viral A08.4

 and BMI 40.0-44.9, adult Z68.41

 

                    [facility]  [place]    [address] [place], [place] [id number] 

[address] 16 2018

                                        Type 2 diabetes mellitus without complic

ations E11.9 ; Essential (primary) 

hypertension I10 ; Screening for breast cancer Z12.31 ; Degenerative lumbar disc
M51.36 ; BMI 40.0-44.9, adult Z68.41 and Morbid obesity E66.01

 

                [address] [address] 26 , 

18    BMI 40.0-44.9, 

adult Z68.41 and Other schizoaffective disorders F25.8

 

                [address] [address] , 

18    Other 

schizoaffective disorders F25.8 ; Lumbar degenerative disc disease M51.36 and 
BMI 40.0-44.9, adult Z68.41

 

                [address] [address] 21 May, 20

18    Shortness of 

breath R06.02

 

                [address] [address] 21 May, 20

18     

 

                [address] [address] 15 May, 20

18     

 

                [address] [address] 14 May, 20

18     

 

                [address] [address] 12 May, 20

18     

 

                [address] [address] 08 May, 20

18    Vaginal 

bleeding N93.9

 

                [address] [address] 01 May, 20

18    BMI 40.0-44.9, 

adult Z68.41 and Vaginal bleeding N93.9

 

                [address] [address] 

18    Dysfunction of 

both eustachian tubes H69.83

 

                [address] [address] 10 Apr, 20

18    BMI 40.0-44.9, 

adult Z68.41 ; Essential (primary) hypertension I10 and Impetigo L01.00

 

                [facility]   [address] [place] [address] 27 Mar, 20

18     

 

                [address] [address] 26 Mar, 20

18    Type 2 diabetes

mellitus with diabetic polyneuropathy, without long-term current use of insulin 
E11.42 ; BMI 40.0-44.9, adult Z68.41 ; Other schizoaffective disorders F25.8 ; 
Gastro-esophageal reflux disease without esophagitis K21.9 ; Essential (primary)
hypertension I10 and Impetigo L01.00

 

                [facility]   [address] [place] [address] 16 Mar, 20

18    Type 2 diabetes

mellitus with diabetic polyneuropathy, without long-term current use of insulin 
E11.42

 

                [address] [address] 

18     

 

                [address] [address] 

18     

 

                [address] [address] 

18    Type 2 diabetes

mellitus with diabetic polyneuropathy, without long-term current use of insulin 
E11.42 ; Other schizoaffective disorders F25.8 ; Gastro-esophageal reflux 
disease without esophagitis K21.9 ; Essential (primary) hypertension I10 and 
Impetigo L01.00

 

                [address] [address] 13 Dec, 20

17    Shortness of 

breath R06.02 ; Type 2 diabetes mellitus without complications E11.9 ; BMI 40.0-
44.9, adult Z68.41 and Pre-ulcerative corn or callous L84

 

                [address] [address] 06 Dec, 20

17     

 

                [address] [address] 

17    Medicare 

welcome exam Z00.00 ; BMI 40.0-44.9, adult Z68.41 ; Medicare annual wellness 
visit, initial Z00.00 ; Medicare annual wellness visit, subsequent Z00.00 and 
Encounter for immunization Z23

 

                [facility] [address] , 

17    Foot callus L84

and Type 2 diabetes mellitus without complications E11.9

 

                [facility] [address] 30 Oct, 

17     

 

                [facility] [place]   [address] [place] [address] 09 Oct, 

17    Encounter for 

immunization Z23

 

                [facility] [address] 06 Sep, 

17    Type 2 diabetes

mellitus without complications E11.9 and Polyneuropathy associated with 
underlying disease G63

 

                [facility] [place]   [address] [place] [address] 31 Aug, 

17    Edema R60.9 ; 

Type 2 diabetes mellitus without complications E11.9 and Anemia, unspecified 
type D64.9

 

                [facility] [place]   [address] [place] [address] 23 Aug, 

17    Hip pain, left 

M25.552

 

                [facility] [place]   [address] [place] [address] 03 Aug, 

17     

 

                [facility] [place]   [address] [place] [address] , 

17     

 

                [facility] [place]   [address] [place] [address] , 

17     

 

                [facility] [place]   [address] [place] [address] 15 , 

17     

 

                [facility] [place]   [address] [place] [address] 14 , 

17     

 

                [facility] [place]   [address] [place] [address] 13 , 

17     

 

                [facility] [place]   [address] [place] [address] 12 , 

17     

 

                [facility] [place]   [address] [place] [address] 08 , 

17     

 

                [facility] [place]   [address] [place] [address] 

17    Type 2 diabetes

mellitus without complications E11.9 and Essential (primary) hypertension I10

 

                [facility] [place]   [address] [place] [address] 22 May, 20

17     

 

                [facility] [address] [address] 16 May, 20

17    Frequency of 

micturition R35.0 ; Dysuria R30.0 and Type 2 diabetes mellitus without 
complications E11.9

 

                [facility] [place]   [address] [place] [address] , 

17     

 

                [facility] [place]   [address] [place] [address] 

17     

 

                [facility] [address] [address] 

17     

 

                [facility] [address] [address] 

17     

 

                [facility] [address] [address] 08 Mar, 20

17    Cellulitis of 

head except face L03.811

 

                [facility] [address] [address] 

17     

 

                [facility] [address] [address] 

17     

 

                [address] [address] 

17    Flu-like 

symptoms R68.89 and Influenza B J10.1

 

                [address] [address] 

17    Type 2 diabetes

mellitus without complications E11.9

 

                [facility] [place]   [address] [place] [address] 

17     

 

                [facility] [address] [address] 10 Yung, 20

17    Type 2 diabetes

mellitus without complications E11.9 ; Anemia, unspecified type D64.9 ; 
Essential (primary) hypertension I10 and Fatigue R53.83

 

                [facility] [address] [address] 

17    Type 2 diabetes

mellitus without complications E11.9 and Foot callus L84

 

                [address] [address] 22 Dec, 20

16     

 

                [address] [address] 05 Dec, 20

16     

 

                [address] [address] 

16     

 

                [address] [address] 

16    Shortness of 

breath R06.02

 

                [address] [address] 

16    Shortness of 

breath R06.02

 

                [address] [address] 06 Oct, 20

16    Shortness of 

breath R06.02

 

                [address] [address] 19 Aug, 20

16    Screening for 

cervical cancer Z12.4 ; Edema R60.9 ; Screening for breast cancer Z12.39 and 
Screening for colon cancer Z12.11

 

                [address] [address] 12 Aug, 20

16    Blood in stool 

K92.1

 

                [address] [address] 11 Aug, 20

16    Anemia, 

unspecified type D64.9

 

                [address] [address] 09 Aug, 20

16    Edema R60.9 ; 

Shortness of breath R06.02 ; Anemia, unspecified type D64.9 and Gastroesophageal
reflux disease without esophagitis K21.9

 

                [address] [address] 05 Aug, 20

16    Shortness of 

breath R06.02 and Edema R60.9

 

                [address] [address] 04 Aug, 20

16     

 

                [address] [address] 04 Aug, 20

16    Shortness of 

breath R06.02 and Edema R60.9

 

                [facility]   [address] [place] [address] 01 Aug, 20

16     

 

                          [facility]     [address] [facility] [id number]

[address] [place] [address]

                                        Shortness of breath R06.02

 

                [facility]   [address] [place] [address] 

16    Shortness of 

breath R06.02 ; Edema R60.9 and Gastroesophageal reflux disease without 
esophagitis K21.9

 

                [facility]   [address] [place] [address] 

16    Shortness of 

breath R06.02

 

                [address] [address] 27 

16     

 

                [address] [address] 27 

16    Dental 

examination Z01.20

 

                [facility]   [address] [place] [address] 10 , 

16     

 

                [address] [address] 10 

16     

 

                [address] [address] 09 

16    Edema R60.9 ; 

Type 2 diabetes mellitus without complications E11.9 and Fatigue R53.83

 

                [address] [address] 18 May, 20

16    Edema R60.9 and

Abdominal muscle strain, initial encounter S39.011A

 

                [facility]   [address] [place] [address] 

16     

 

                [facility]   [address] [place] [address] 18 

16    Hip pain, right

M25.551 and Edema R60.9

 

                [facility]   [address] [place] [address] 

16     

 

                [facility] [address] [address] 04 

16    Edema R60.9 and

Impetigo L01.00

 

                [address] [address] 

16    Type 2 diabetes

mellitus without complications E11.9 and Edema R60.9

 

                [address] [address] 

16     

 

                [address] [address] 

16    Contusion of 

thigh, right S70.11XA

 

                [address] [address] 

16    Hip pain, right

M25.551

 

                [address] [address] 

16     

 

                [address] [address] 23 Dec, 20

15    Left hip pain 

M25.552

 

                [address] [address] 04 Dec, 20

15    Acute maxillary

sinusitis, recurrence not specified J01.00 and Vomiting, nausea presence 
unspecified, unspecified intactability, vomiting of unspecified type R11.10

 

                [address] [address] 01 Dec, 20

15    Acute maxillary

sinusitis, recurrence not specified J01.00

 

                [address] [address] 08 Oct, 20

15    Acquired 

spondylolisthesis of lumbosacral region M43.17 ; Encounter for immunization Z23 
and Cholelithiasis K80.20

 

                [address] [address] 28 Sep, 20

15     

 

                [address] [address] 24 Sep, 20

15    Lumbar 

radiculopathy 724.4

 

                [address] [address] 21 Sep, 20

15     

 

                [facility] [place]   [address] [place] [address] 27 Aug, 20

15    Esophageal 

reflux 530.81

 

                [facility] [address] [address] 20 Aug, 20

15    Esophageal 

reflux 530.81 ; Essential hypertension, benign 401.1 and Diabetes mellitus 
without mention of complication, type II or unspecified type, not stated as 
uncontrolled 250.00

 

                [facility] [address] [address] 07 Aug, 20

15     

 

                [facility] [place]   [address] [place] [address] 05 Aug, 20

15    Myalgia and 

myositis 729.1

 

                [facility] [place]   [address] [place] [address] 

15    Upper 

respiratory infection 465.9 ; Other symptoms involving skin and integumentary 
tissues 782.9 and Factitial dermatitis 698.4

 

                [facility] [address] [address] 

15    Factitial 

dermatitis 698.4

 

                [facility] [place]   [address] [place] [address] 

15     

 

                [facility] [address] [address] 

15    Esophageal 

reflux 530.81

 

                [facility] [address] [address] 

15     

 

                [facility] [address] [address] 

15    Esophageal 

reflux 530.81

 

                [facility] [place]   [address] [place] [address] 27 May, 20

15     

 

                [facility] [address] [address] 22 May, 20

15    Dyspepsia 536.8

and Epigastric pain 789.06

 

                [facility] [place]   [address] [place] [address] 05 May, 20

15     

 

                [facility] [address] [address] 04 May, 20

15     

 

                [facility] [address] 04 May, 20

15    Impetigo 684

 

                [facility] [address] 

15    Other symptoms 

involving skin and integumentary tissues 782.9 and Seborrheic keratosis 702.19

 

                          [facility]     [address] [facility] [id number]

[address] [place] [address]

                          14 2015               

 

                          [facility]     [address] [id number]

[address] [place] [address]

                                         

 

                [facility] [address] 20 Mar, 20

15     

 

                          [facility]     [address] [id number]

[address] [place] [address]

                          20 Mar, 2015               

 

                [facility] [address] 18 Mar, 20

15     

 

                          [facility]     [address] [id number]

[address] [place] [address]

                          18 Mar, 2015               

 

                [facility] [address] 16 Mar, 20

15     

 

                          [facility]     [address] [id number]

[address] [place] [address]

                          16 Mar, 2015               

 

                [facility] [address] 05 Mar, 20

15     

 

                          [facility]     [address] [facility] [id number]

[address] [place] [address]

                          05 Mar, 2015               

 

                [facility] [address] 

15     

 

                          [facility]     [address] [facility] [id number]

[address] [place] [address]

                                         

 

                [facility] [address] 

15     

 

                          [facility]     [address] [facility] [id number]

[address] [place] [address]

                                         

 

                [facility] [address] 08 Dec, 20

14     

 

                          [facility] [place]     [address] [facility] [id number]

[address] [place] [address]

                          08 Dec, 2014               

 

                [facility] [address] , 

14     

 

                          [facility]     [address] [facility] [id number]

[address] [place] [address]

                                         

 

                [facility] [address] 08 Oct, 20

14     

 

                          [facility]     [address] [facility] [id number]

[address] [place] [address]

                          08 Oct, 2014               

 

                [facility] [address] 02 Oct, 20

14     

 

                          [facility]     [address] [facility] [id number]

[address] [place] [address]

                          02 Oct, 2014               

 

                          [facility]     [address] [facility] [id number]

[address] [place] [address]

                          12 Sep, 2014               

 

                [facility] [address] 09 Sep, 

14     

 

                          [facility]     [address] [facility] [id number]

[address] [place] [address]

                          09 Sep, 2014               

 

                          [facility]     [address] [facility] [id number]

[address] [place] [address]

                          03 Sep, 2014               

 

                [facility] [address] 03 Sep, 

14     

 

                [facility] [address] 

14     

 

                          [facility] [place]     [address] [facility] [id number]

[address] [place] [address]

                                         

 

                [facility] [address] 02 Mar, 20

14     

 

                          [facility]     [id number]

[address] [place] [address]

                          02 Mar, 2014               

 

                [facility]   [address] [place] [address] 

14     

 

                          [facility]     [address] [id number]

[address] [place] [address]

                                         

 

                          [facility]     [address] [id number]

[address] [place] [address]

                                         

 

                [facility]   [address] [place] [address] 

14     

 

                          [facility]     [address] [address]

                                         

 

                [facility]   [address] [place] [address] 

14     

 

                          [place] [id number]

[address] [place] [address]

                                         

 

                [facility] [place]   [address] [place] [address] 

13     

 

                          [facility]     [address] [id number]

[address] [place] [address]

                                         

 

                [facility]   [address] [place] [address] 

13     

 

                          [facility]     [address] [id number]

[address] [place] [address]

                                         

 

                [facility] [place]   [address] [place] [address] 

13     

 

                          [facility]     [id number]

[address] [place] [address]

                                         







IMMUNIZATIONS

No Known Immunizations



SOCIAL HISTORY

Never Assessed



REASON FOR VISIT





PLAN OF CARE





VITAL SIGNS





                    Weight              211.01 lbs          2014

 

                    Temperature         98.1 degrees Fahrenheit 2014

 

                    Heart Rate          60 bpm              2014

 

                    Respiratory Rate    18                  2014

 

                    Blood pressure systolic 118 mmHg            2014

 

                    Blood pressure diastolic 80 mmHg             2014







MEDICATIONS

Unknown Medications



RESULTS

No Results



PROCEDURES

No Known procedures



INSTRUCTIONS





MEDICATIONS ADMINISTERED

No Known Medications



MEDICAL (GENERAL) HISTORY





                    Type                Description         Date

 

                    Medical History     Other symptoms involving skin and integu

mentary tissues  

 

                    Medical History     Impetigo             

 

                    Medical History     Schizoaffective disorder, unspecified  

 

                    Medical History     Essential hypertension, benign  

 

                          Medical History           Diabetes mellitus without me

ntion of complication, type II or 

unspecified type, not stated as uncontrolled  

 

                    Medical History     Urinary frequency    

 

                    Medical History     Encounter for long-term (current) use of

 other medications  

 

                    Medical History     Esophageal reflux    

 

                    Medical History     DIABETES TYPE II     

 

                    Medical History     HIGH BLOOD PRESSURE- pt states sometimes

 it goes low  

 

                    Medical History     BACK TROUBLE         

 

                    Surgical History    cholecystectomy      

 

                    Surgical History    heart cath          2019

 

                    Hospitalization History Surgery(s) only      

 

                          Hospitalization History   possible pneumonia or fluid 

on around heart-sent to [facility] 

heart/thinking COPD                     2019

## 2020-04-10 NOTE — XMS REPORT
Patient Summarization Differential

                             Created on: 04/10/2020



Radha Hardin

External Reference #: [id number]

: 1954

Sex: Female



Demographics





                          Address                   [address] [place] [address] Phone                [phone]

 

                          Preferred Language        English

 

                          Marital Status            S

 

                          Jain Affiliation     Unknown

 

                          Race                      White

 

                          Ethnic Group              Not  or 





Author





                          Author                    [employer]

 

                          Organization              [employer]

 

                          Address                   [address] [place] [address]  [address]



 

                          Phone                     [phone]







Care Team Providers





                    Care Team Member Name Role                Phone

 

                    WANKER, BONG     Unavailable         Unavailable

 

                    SINGER, SATNAM        Unavailable         Unavailable

 

                    NO, LOCAL PHYSICIAN Unavailable         Unavailable

 

                    APONTE, JAN           Unavailable         Unavailable

 

                    CURL, ANTONIO        Unavailable         Unavailable

 

                    SINGER, SATNAM        Unavailable         [phone]

 

                    SINGER, SATNAM        Unavailable         [phone]

 

                    SINGER, SATNAM        Unavailable         [phone]

 

                    SINGER, SATNAM        Unavailable         [phone]

 

                    SINGER, SATNAM        Unavailable         [phone]

 

                    WANKER, BONG     Unavailable         [phone]

 

                    SINGER, SATNAM        Unavailable         [phone]

 

                    SINGER, SATNAM        Unavailable         [phone]

 

                    SINGER, SATNAM        Unavailable         [phone]

 

                    SINGER, SATNAM        Unavailable         [phone]

 

                    SINGER, SATNAM        Unavailable         [phone]

 

                    SINGER, SATNAM        Unavailable         [phone]

 

                    SINGER, SATNAM        Unavailable         [phone]

 

                    SINGER, SATNAM        Unavailable         [phone]

 

                    SINGER, SATNAM        Unavailable         [phone]

 

                    SINGER, SATNAM        Unavailable         [phone]

 

                    WANKER, BONG     Unavailable         [phone]

 

                    WANKER, BONG     Unavailable         [phone]

 

                    SINGER, SATNAM        Unavailable         [phone]

 

                    WANKER, BONG     Unavailable         [phone]

 

                    WANKER, BONG     Unavailable         [phone]

 

                    WANKER, BONG     Unavailable         [phone]

 

                    WANKER, BONG     Unavailable         [phone]

 

                    WANKER, BONG     Unavailable         [phone]

 

                    WANKER, BONG     Unavailable         [phone]

 

                    WANKER, BONG     Unavailable         [phone]

 

                    WANKER, BONG     Unavailable         [phone]

 

                    WANKER, BONG     Unavailable         [phone]

 

                    WANKER, BONG     Unavailable         [phone]

 

                    WANKER, BONG     Unavailable         [phone]

 

                    WANKER, BONG     Unavailable         [phone]

 

                    APONTE, JAN           Unavailable         [phone]

 

                    WANKER, BONG     Unavailable         [phone]

 

                    WANKER, BONG     Unavailable         [phone]

 

                    WANKER, BONG     Unavailable         [phone]

 

                    WANKER, BONG     Unavailable         [phone]

 

                    WANKER, BONG     Unavailable         [phone]

 

                    APONTE, JAN           Unavailable         [phone]

 

                    WANKER, BONG     Unavailable         [phone]

 

                    WANKER, BONG     Unavailable         [phone]

 

                    WANKER, BONG     Unavailable         [phone]

 

                    WANKER, BONG     Unavailable         [phone]

 

                    WANKER, BONG     Unavailable         [phone]

 

                    WANKER, BONG     Unavailable         [phone]

 

                    WANKER, BONG     Unavailable         [phone]

 

                    WANKER, BONG     Unavailable         [phone]

 

                    WANKER, BONG     Unavailable         [phone]

 

                    WANKER, BONG     Unavailable         [phone]

 

                    RAMON, SAI       Unavailable         [phone]

 

                    WANKER, BONG     Unavailable         [phone]

 

                    RAMON, SAI       Unavailable         [phone]

 

                    WANKER, BONG     Unavailable         [phone]

 

                    RAMON, SAI       Unavailable         [phone]

 

                    Migration, Doctor   Unavailable         Unavailable

 

                    Migration, Doctor   Unavailable         Unavailable

 

                    Migration, Doctor   Unavailable         Unavailable

 

                    ZENISEK, SHIREEN     Unavailable         Unavailable

 

                    ZENISEK, SHIREEN     Unavailable         Unavailable

 

                    WANKER, BONG     Unavailable         Unavailable

 

                    ZENISEK, SHIREEN     Unavailable         Unavailable

 

                    DR ERIK REBOLLEDO Unavailable         Unavailable

 

                    MILLIONJOSÉ LUIS     Unavailable         Unavailable

 

                    BONILLA CROSS        Unavailable         Unavailable

 

                    PONCHO SNELL      Unavailable         Unavailable

 

                    ASMSLOAN PADILLA   Unavailable         Unavailable

 

                    HIMANSHU DOROTEO        Unavailable         [phone]

 

                    Migration, Doctor   Unavailable         Unavailable

 

                    Migration, Doctor   Unavailable         Unavailable

 

                    Migration, Doctor   Unavailable         Unavailable

 

                    Migration, Doctor   Unavailable         Unavailable

 

                    WANKER, BONG     Unavailable         [phone]

 

                    WANKER, BONG     Unavailable         [phone]

 

                    Migration, Doctor   Unavailable         Unavailable

 

                    DOWNS, DOROTEO        Unavailable         [phone]

 

                    WANKER, BONG J   Unavailable         Unavailable

 

                    Migration, Doctor   Unavailable         Unavailable

 

                    DOWNS, DOROTEO        Unavailable         [phone]

 

                    APONTE, JAN           Unavailable         [phone]

 

                    KANE RAYO DO  Unavailable         Unavailable

 

                    Kane Cruz      Unavailable         Unavailable

 

                    Wanker, Bong J   Unavailable         Unavailable

 

                    [facility] Unavailable         Betsey

maria a BURGOS MD, ERNESTO MALONE  Unavailable         Unavailable

 

                    Bong Crane   Unavailable         Unavailable

 

                    Bong Crane   Unavailable         Unavailable

 

                    Migration, Doctor   Unavailable         Unavailable

 

                    DOROTEO DOWNS        Unavailable         [phone]

 

                    DOROTEO DOWNS        Unavailable         [phone]

 

                          Unavailable               Unavailable

 

                    DOWNSDOROTEO CHAUDHARI        Unavailable         [phone]

 

                    Migration, Doctor   Unavailable         Unavailable



                                            



Allergies

                      



      



           Normalized  Allergy   Reported  Date of   Reaction(s)  Care Provider 

 Facility



                 Allergy Type    classification  allergen        Allergy Onset  

 

 

      



            DA (1 source.)  Unclassified  No Known   no information  SHIREEN BRUCE

PAVITHRA  [facility]



                                         ([id number])

 

      



            DA (1 source.)  Unclassified  No Known Drug  2016 -  no inform

makennaion  

GIA CASH [facility]



                     Allergies           [facility]



                                         ([id number])

 

      



            Drug (5    Unclassified  No Known   no information  Bong Crane  

[facility]



                     sources.)           Medication          [facility]



                                         ([id number])



                                                                                
                 



Medications

                      Current Medications            

            



        



         Medication  Ingredient  Drug    Dose    Dates   Status  Sig     Sig    

 Care



                 Class(es)       (Normalized)    (Original)      Provid



                                         er

 

        



         diazePAM 5  diazePAM  Benzodiazep  5 mg    20  Active  no      Va

lium 5 mg  no



           mg oral   Translation  ine       18        information  Orally Once  

name



                 tablet (5       s: [ Valium       30 minutes      (no



                 sources.)       5 mg,           before          phone)



                           Valium 5                  procedure 1 



                           mg]                       tablet as 



                                         needed 09 



                                         May, 2018 1 



                                         dose Active 

 

        



         fluconazole  Fluconazole  Azole   150 mg  20  Active  no      Dif

lucan 150  no



           150 mg oral  Translation  Antifungal   19        information  MG Oral

ly  name



                 tablet (1       s: [            once and        (no



                 source.)        Diflucan        repeat in 72    phone)



                           150 MG]                   hours 1 



                                         tablet 18 



                                         Mar, 2019 



                                         Active 

 

        



         medroxyPROG  medroxyPROG  Progestin  5 mg    20  Active  no      

Provera 5 mg  

no



            ESTERone   ESTERone    18 -       information  Orally Once  name



              acetate 5    Translation    20     a day 1      (no



              mg oral      s: [         18           tablet with  phone)



                     tablet (1           Provera 5           food 24h 09 



                     source.)            mg]                 May, 2018 16 



                                         May, 2018 07 



                                         days Active 

 

        



         miconazole  Miconazole  Azole   20      20  Active  no      [facility]

azole 7  no



         nitrate 20  Translation  Antifungal  mg/mL   19      information  2 % V

aginal  name



                 mg/ml           s: [            Once a day 1    (no



                 vaginal         Miconazole       applicatorfu    phone)



                     cream (2            7 2 %]              l at bedtime 



                           sources.)                 24h 18 Mar, 



                                         2019 7 



                                         day(s) 



                                         Active 

 

        



         no      Proventil  no      2       10-06-20  Active  take 2  Proventil 

 no



         information   (90  information  puff(s  16      puff(s) by  HFA 

108 (90  

name



            (1 source.)  Base)      )          inhalation  Base)      (no



                 MCG/ACT         every four      MCG/ACT         phone)



                           hours as                  Inhalation 



                           needed                    every 4 hrs 



                                         2 puffs as 



                                         needed 4h 06 



                                         Oct, 2016 



                                         Active 

 

        



         no      Spiriva  no      2       Active  take 2  Spiriva  no



           information  Respimat  information  puff(s    puff(s) by  Respimat  n

mohsen



            (1 source.)  1.25       )          inhalation  1.25 MCG/ACT  (no



                 MCG/ACT         once daily      Inhalation      phone)



                                         Once a day 2 



                                         puffs 24h 



                                         Active 



            

            Completed/Discontinued Medications            

            



        



         Medication  Ingredient  Drug    Dose    Dates   Status  Sig     Sig    

 Care



                 Class(es)       (Normalized)    (Original)      Provid



                                         er

 

        



         no      Albuterol /  Anticholine   20  no      no      no      no



         information  Ipratropium  rgic,   19 -    informat  information  inform

ation  

name



              (2           beta2-Adren   20     ion          (no



                 sources.)       ergic           19              phone)



                                         Agonist      

 

     



              2019   no           no           no           no name



                 information     inform          informat        (no



                     ation               ion                 phone)

 

        



         no      ALUM-MAG  no      20  no      no      no      no



         information  HYDROXIDE-S  information   19      informat  information  

information 

name



                 (4              IMETH           ion             (no



                           sources.)                 phone)

 

     



              2019   no           no           no           no name



              -            information  inform       informat     (no



                 2019      ation           ion             phone)

 

     



              2019   no           no           no           no name



              -            information  inform       informat     (no



                 2019      ation           ion             phone)

 

     



              2019   no           no           no           no name



                 information     inform          informat        (no



                     ation               ion                 phone)

 

        



         no      B COMPLEX  no      20  no      no      no      no



         information  VITAMINS  information   19 -    informat  information  inf

ormation  

name



                 (2              20        ion             (no



                     sources.)           19                  phone)

 

     



              2019   no           no           no           no name



                 information     inform          informat        (no



                     ation               ion                 phone)

 

        



         no      Bisacodyl  Stimulant   20  no      no      no      no



         information  Translation  Laxative    -    informat  information  inf

ormation  

name



              (4           s: [         20     ion          (no



                 sources.)       BISACODYL,      19              phone)



                                         BISACODYL]       

 

     



              2019   no           no           no           no name



                 information     inform          informat        (no



                     ation               ion                 phone)

 

        



         no      cetirizine  Histamine-1   20  no      no      no      no



         information  Translation  Receptor   19 -    informat  information  inf

ormation  

name



            (12        s: [ Zyrtec  Antagonist   20   ion        (no



                 sources.)       Allergy 10      19              phone)



                                         mg, Zyrtec       



                                         Allergy 10       



                                         mg,       



                                         CETIRIZINE,       



                                         cetirizine       



                                         hydrochlori       



                                         de 10 MG       



                                         Oral Tablet       



                                         [Zyrtec],       



                                         Zyrtec       



                                         Allergy 10       



                                         mg]       

 

     



              2019   no           no           no           no name



                 information     inform          informat        (no



                     ation               ion                 phone)

 

     



            10 mg      2018  Active     no         Zyrtec     no name



                 -               inform          Allergy         (no



                 2018      ation           10 mg           phone)



                                         Orally 



                                         Once a 



                                         day 1 



                                         tablet 



                                         24h 25 



                                         Apr, 



                                         2018 22 



                                         Sep, 



                                         2018 30 



                                         day(s) 



                                         Active 

 

     



            10 mg      2018  Active     no         Zyrtec     no name



                 -               inform          Allergy         (no



                 2018      ation           10 mg           phone)



                                         Orally 



                                         Once a 



                                         day 1 



                                         tablet 



                                         24h 25 



                                         Apr, 



                                         2018 22 



                                         Sep, 



                                         2018 30 



                                         day(s) 



                                         Active 

 

     



              10 mg        Active       no           Zyrtec       no name



                     inform              Allergy             (no



                     ation               10 mg               phone)



                                         Orally 



                                         Once a 



                                         day 1 



                                         tablet 



                                         24h 90 



                                         Active 

 

        



         no      DEXILANT 60  no      60 mg   20  Complete  no      DEXILA

NT 60  no



         information  MG ORAL  information   15 -    d       information  MG ORA

L  name



              (1 source.)  CAPSULE      20     CAPSULE      (no



                 DELAYED         18              DELAYED         phone)



                           RELEASE                   RELEASE 1 po 



                           Translation               q a.m., at 



                           s: [                      least 30min 



                           DEXILANT 60               prior to 



                           MG ORAL                   first meal 



                           CAPSULE                    



                           DELAYED                    



                           RELEASE]                  DEXILANT 60 



                                         MG ORAL 



                                         CAPSULE 



                                         DELAYED 



                                         RELEASE 



                                         DEXLANSOPRAZ 



                                         OLE Inactive 

 

        



         no      DOCUSATE  no      20  no      no      no      no



         information  SODIUM  information    -    informat  information  infor

mation  

name



                 (2              20        ion             (no



                     sources.)           19                  phone)

 

     



              2019   no           no           no           no name



                 information     inform          informat        (no



                     ation               ion                 phone)

 

        



         no      EQL ALL DAY  no      10 mg   20  no      take 1  EQL ALL 

DAY  Tanisha E



         information  ALLERGY 10  information   18      informat  tablet by  ALL

ERGY 10  

Master



            (1 source.)  MG ORAL     ion        mouth once  MG ORAL    MD (no



                 TABLET          daily           TABLET 1        phone)



                                         tablet daily 



                                          



                                         CETIRIZINE 



                                         HCL 



                                         [id number] 



                                         Active Tanisha Thao MD 



                                         Active 

 

        



         no      Famotidine  Histamine-2   20  no      no      no      no



           information   Receptor   19 -      informat  information  information

  name



              (2           Antagonist   20     ion          (no



                     sources.)           19                  phone)

 

     



              2019   no           no           no           no name



                 information     inform          informat        (no



                     ation               ion                 phone)

 

        



         no      HYDROcodone  no      20  no      no      no      no



         information  -ACET   information   19 -    informat  information  infor

mation  

name



              (2           10-325MG     20     ion          (no



                     sources.)           19                  phone)

 

     



              2019   no           no           no           no name



                 information     inform          informat        (no



                     ation               ion                 phone)

 

        



         no      INSULIN  no      20  no      no      no      no



         information  NOVOLOG  information    -    informat  information  info

rmation  

name



                 (3              20        ion             (no



                     sources.)           19                  phone)

 

     



              2019   no           no           no           no name



                 information     inform          informat        (no



                     ation               ion                 phone)

 

     



              2019   no           no           no           no name



              -            information  inform       informat     (no



                 2019      ation           ion             phone)

 

        



         no      Magnesium  no      20  no      no      no      no



         information  Hydroxide  information   19      informat  information  in

formation  

name



                     (4                  ion                 (no



                           sources.)                 phone)

 

     



              2019   no           no           no           no name



              -            information  inform       informat     (no



                 2019      ation           ion             phone)

 

     



              2019   no           no           no           no name



              -            information  inform       informat     (no



                 2019      ation           ion             phone)

 

     



              2019   no           no           no           no name



                 information     inform          informat        (no



                     ation               ion                 phone)

 

        



         no      Morphine  Opioid   20  no      no      no      no



           information   Agonist   19 -      informat  information  information 

 name



                 (2              20        ion             (no



                     sources.)           19                  phone)

 

     



              2019   no           no           no           no name



                 information     inform          informat        (no



                     ation               ion                 phone)

 

        



         no      Nitroglycer  Nitrate   20  no      no      no      no



         information  in      Vasodilator   19 -    informat  information  infor

mation  name



                 (2              20        ion             (no



                     sources.)           19                  phone)

 

     



              2019   no           no           no           no name



                 information     inform          informat        (no



                     ation               ion                 phone)

 

        



         no      ondansetron  Serotonin-3   20  no      no      no      no



         information  Translation  Receptor   19 -    informat  information  inf

ormation  

name



            (12        s: [ Zofran  Antagonist   20   ion        (no



                 sources.)       4 MG,           19              phone)



                                         Zofran 4       



                                         MG,       



                                         ONDANSETRON       



                                         HCL,       



                                         ONDANSETRON       



                                         HCL]       

 

     



              2019   no           no           no           no name



                 information     inform          informat        (no



                     ation               ion                 phone)

 

     



              2019   no           no           no           no name



              -            information  inform       informat     (no



                 2019      ation           ion             phone)

 

     



              2019   no           no           no           no name



                 information     inform          informat        (no



                     ation               ion                 phone)

 

     



            4 mg       2018  Active     take 1     Zofran 4   no name



                     tablet              MG                  (no



                     by                  Orally              phone)



                           mouth                     every 8 



                           every                     hrs prn 



                           eight                     nausea 1 



                           hours                     tablet 



                           as                        as 



                           needed                    needed 



                           for                       30 Jul, 



                           nausea                    2018 



                                         Active 

 

     



            4 mg       2018  Active     no         Zofran 4   no name



                     inform              MG                  (no



                     ation               Orally              phone)



                                         every 8 



                                         hrs prn 



                                         nausea 1 



                                         tablet 



                                         as 



                                         needed 



                                          



                                         Active 

 

        



         no      predniSONE  no      20  no      no      no      no



           information   information   19 -      informat  information  informat

ion  name



                 (2              20        ion             (no



                     sources.)           19                  phone)

 

     



              2019   no           no           no           no name



              -            information  inform       informat     (no



                 2019      ation           ion             phone)

 

        



         no      pregabalin  no      20  no      no      no      no



           information  Translation  information         informat  informati

on  

information                              name



              (7           s: [ Lyrica    20     ion          (no



                 sources.)       75 MG,          19              phone)



                                         PREGABALIN,       



                                         Lyrica       



                                         75MG]       

 

     



              2019   no           no           no           no name



                 information     inform          informat        (no



                     ation               ion                 phone)

 

     



              75 mg        Active       take 1       Lyrica       no name



                     capsul              75MG                (no



                     e by                Orally              phone)



                           mouth                     twice a 



                           twice                     day 1 



                           daily                     capsule 



                                         12h 28 



                                         Active 

 

        



         no      Simvastatin  HMG-CoA   20  no      no      no      no



           information   Reductase   19 -      informat  information  informatio

n  name



              (2           Inhibitor    20     ion          (no



                     sources.)           19                  phone)

 

     



              2019   no           no           no           no name



                 information     inform          informat        (no



                     ation               ion                 phone)

 

        



         no      tiZANidine  Central   20  no      no      no      no



         information  Translation  alpha-2   19 -    informat  information  info

rmation  

name



            (11        s: [       Adrenergic   06-08-20   ion        (no



              sources.)    Tizanidine   Agonist      19           phone)



                                         HCl 2 MG,       



                                         Tizanidine       



                                         HCl 2 MG,       



                                         tiZANidine]       

 

     



              2019   no           no           no           no name



                 information     inform          informat        (no



                     ation               ion                 phone)

 

     



            2 mg       2018  Active     take 1     Tizanidi   no name



                     tablet              ne HCl 2            (no



                     by                  MG                  phone)



                           mouth                     Orally 



                           three                     Three 



                           times                     times a 



                           daily                     day 1 



                           as                        tablet 



                           needed                    as 



                                         needed 



                                         8h  



                                         Active 

 

     



            2 mg       2018  Active     no         Tizanidi   no name



                     inform              ne HCl 2            (no



                     ation               MG                  phone)



                                         Orally 



                                         Three 



                                         times a 



                                         day 1 



                                         tablet 



                                         as 



                                         needed 



                                         8h  



                                         Active 

 

        



         no      zolpidem  gamma-Amino   20  no      no      no      no



           information   butyric   19 -      informat  information  information 

 name



              (2           Acid-ergic   20     ion          (no



                 sources.)       Agonist         19              phone)

 

     



              2019   no           no           no           no name



                 information     inform          informat        (no



                     ation               ion                 phone)



                                                                                
                                                                                
                                                                                
                                                                                
    



Problems

                      Active Problems            

            



      



           Problem   Normalized  Date of   Normalized  Normalized  Provider  Fac

ility



              Classification  Problem(s)   Problem      Problem      Problem Sta

tus  



                           Onset/Resoluti            Duration   



                                         on    

 

      



           Congestive  Acute     2019 -  Chronic   Active    SHIREEN ARSHAD  [facility]



                     heart failure;      diastolic           Hospital



                     nonhypertensiv      (congestive)        (85213)



                           e (1 source.)             heart failure     

 

      



           Other     Body mass  2019 -  Chronic   Active    SHIREEN ISRAEL

  [facility]



                     nutritional;        index (BMI)         Hospital



                     endocrine; and      36.0-36.9,          (81532)



                           metabolic                 adult     



                                         disorders (1      



                                         source.)      

 

      



           Unclassified  Body Mass  05- -  no information  Active    no n

mohsen   [facility]



                     (1 source.)         Index               [facility]



                           40.0-44.9                 (11350) (Work



                           Adult                     Phone:



                                         [phone]



                                         )

 

      



            Respiratory  Dependence on   Chronic    Active     K[facility] Horan

ho



                     failure;            supplemental        [facility]



                     insufficiency;      oxygen              [facility]



                           arrest (adult)            [facility]



                           (1 source.)               (55346)

 

      



            Abdominal  Diaphragmatic   Episodic   Active     GIA CASH

 Via



                 hernia (1       hernia without     DO              [facility]



                     source.)            obstruction or      Hospital -



                           gangrene                  [facility]



                                         (24093)

 

      



            Residual   Edema,     Episodic   Active     BONG CRANE  [facility]

y



                 codes;          unspecified     [id number] [place] [address] Center



                     unclassified        Translations:       [facility] [place]



                     (20 sources.)       [ - Edema           [place] (79476)



                                         R60.9]     

 

      



           Residual  Family history  2019 -  Episodic  Active    SHIREEN ALARCON  

[facility]



                     codes;              of ischemic         Hospital



                     unclassified        heart disease       (28410)



                           (1 source.)               and other     



                                         diseases of     



                                         the     



                                         circulatory     



                                         system     

 

      



            Other      Hip pain   Episodic   Active     BONG Wilsonit

y



                 non-traumatic   Translations:     06402           [facility]



                     joint               [ Hip pain,         [facility]



                     disorders (20       left, Hip           [place] (76725)



                           sources.)                 pain, left]     

 

      



            Deficiency and  Iron       Episodic   Active     KANE RAYO  [facility]

 Via



                 other anemia    deficiency      DO              [facility]



                     (1 source.)         anemia,             Hospital -



                           unspecified               [facility]



                                         (65767)

 

      



            Unclassified  Long-term   no information  Active     BONG CRANE 

 [facility]



                 (17 sources.)   current use of     39798           [facility]

r



                           insulin                   of [facility]



                           Translations:             [place] (39421)



                                         [ Long term     



                                         current use of     



                                         insulin, Long     



                                         term current     



                                         use of     



                                         insulin]     

 

      



           Other     Obesity,  2019 -  Chronic   Active    SHIREEN ISRAEL 

 [facility]



                     nutritional;        unspecified         Hospital



                           endocrine; and            (64635)



                                         metabolic      



                                         disorders (1      



                                         source.)      

 

      



           Nonspecific  Other chest  2019 -  Episodic  Active    SHIREEN ALARCON  

[facility]



                     chest pain (1       pain                Hospital



                           source.)                  (47157)

 

      



            Other      Other fecal   Episodic   Active     KANE RAYO [facility]

ia



                 gastrointestin  abnormalities     DO              [facility]



                           al [facility]              Hospital -



                           (1 source.)               [facility]



                                         (74437)

 

      



            Other skin  Other      Episodic   Active     BONG Wilsoni

ty



                 disorders (20   seborrheic      27912           [facility]



                     sources.)           keratosis           [facility]



                           Translations:             [place] (15839)



                                         [ - Seborrheic     



                                         keratosis     



                                         702.19, -     



                                         Seborrheic     



                                         keratosis     



                                         702.19]     

 

      



            Other      Pain in right   Episodic   Active     BONG CRANE  Com

munity



                 non-traumatic   hip             85262           [facility]



                     joint               Translations:       [facility]



                     disorders (20       [ - Hip pain,       [place] (50388)



                           sources.)                 right M25.551,     



                                         - Hip pain,     



                                         right M25.551]     

 

      



           Screening and  Personal  2019 -  Episodic  Active    SHIREEN PACHECO  [facility]



                     history of          history of          Hospital



                     mental health       nicotine            (60293)



                           and substance             dependence     



                                         abuse codes (1      



                                         source.)      

 

      



            Other female  Polyp of   Episodic   Active     no name    [facility]



                     genital             corpus uteri        [facility]



                           disorders (2              [facility]



                           sources.)                 [facility]



                                         (01974)

 

      



           Menopausal  Postmenopausal  2018 -  Chronic   Active    no name

   [facility] 

[facility]



                     disorders (3        bleeding            [facility]



                     sources.)           Translations:       (38125) (Work



                           [                         Phone:



                           Postmenopausal            [phone]



                           bleeding,                 )



                                         Postmenopausal     



                                         bleeding]     

 

      



           Osteoarthritis  Primary   02- -  Chronic   Active    Kanejane Irving

on  [facility]



                     (2 sources.)        osteoarthritis      [facility]



                           , [facility]



                                         (55187)

 

      



            Joint      Traumatic   Chronic    Active     Kanejane Cruz  [facility]



                     disorders and       arthropathy,        [facility]



                     dislocations;       right shoulder      [facility]



                           trauma-related            [facility]



                           (1 source.)               (28885)

 

      



           Asthma (1  Unspecified  2019 -  Chronic   Active    SHIREEN KUMAR  [facility]



                     source.)            asthma,             Hospital



                           uncomplicated             (11521)

 

      



            External cause  Unspecified   Episodic   Active     Kanejane Cruz  [facility]

john



                     codes: Fall (1      fall, initial       [facility]



                     source.)            encounter           [facility]



                                         (65268)

 

      



            Other female  Vaginal    Episodic   Active     [facility]ONG CRANE  [facility]

nity



                 genital         bleeding        26117           Health Center



                     disorders (20       Translations:       [facility] [place]



                     sources.)           [ Vaginal           [place] (62590)



                                         bleeding,     



                                         Vaginal     



                                         bleeding]     



            

            Past or Other Problems            

            



      



           Problem   Normalized  Date of   Normalized  Normalized  Provider  Fac

ility



              Classification  Problem(s)   Problem      Problem      Problem Sta

tus  



                           Onset/Resoluti            Duration   



                                         on    

 

      



            Other      Effusion,   Episodic   Completed  Bong Crane  [facility]



                     non-traumatic       right shoulder      [facility]



                           joint                     [facility]



                           disorders (1              [facility]



                           source.)                  (07988)

 

      



           Other     Long term  2019 -  no information  no information  LANIE

MERRY ISRAEL 

[facility]



                     aftercare (1        (current) use       Hospital



                     source.)            of oral             (36706)



                                         hypoglycemic     



                                         drugs     



                                                                                
                                                                                
                                                                                
                                                                           



Procedures

                      



    



              Procedure    Normalized Procedure  Procedure Result  Performer    

Facility



                                         Date    

 

    



              2018   Admin pneumococcal  no information  no name (no phone

)  [facility] (68383)

 

    



                 Fluoroscopy of  no information  no name (no phone)  [facility]



                           Multiple Coronary         (01360)



                                         Arteries using Low   



                                         Osmolar Contrast   

 

    



              2019   FQHC visit, estab pt  no information  no name (no robbi

ne)  [facility] (42752)

 

    



              2018   FQHC visit, estab pt  no information  no name (no robbi

ne)  [facility] (59084)

 

    



              2018   FQHC visit, estab pt  no information  no name (no robbi

ne)  [facility] (12882)

 

    



              10-   FQHC visit, estab pt  no information  no name (no robbi

ne)  [facility] (99748)

 

    



              2018   FQHC visit, estab pt  no information  no name (no robbi

ne)  [facility] (02125)

 

    



              2018   FQHC visit, estab pt  no information  no name (no robbi

ne)  [facility] (72407)

 

    



              2018   FQHC visit, estab pt  no information  no name (no robbi

ne)  [facility] (54459)

 

    



              2018   FQHC visit, estab pt  no information  no name (no robbi

ne)  [facility] (57680)

 

    



              2018   FQHC visit, estab pt  no information  no name (no robbi

ne)  [facility] (78399)

 

    



              2018   FQHC visit, estab pt  no information  no name (no robbi

ne)  [facility] (67886)

 

    



              2018   FQHC visit, estab pt  no information  no name (no robbi

ne)  [facility] (23424)

 

    



              2019   Hemoglobin   no information  no name (no phone)  Comm

[facility]



                           glycosylated a1c          [facility] (85331)

 

    



              10-   Hemoglobin   no information  no name (no phone)  Comm

[facility]



                           glycosylated a1c          [facility] (74638)

 

    



              2018   Hemoglobin   no information  no name (no phone)  Comm

[facility]



                           glycosylated a1c          [facility] (26296)

 

    



                 Hysteroscopy bx  no information  no name (no phone)  [facility]

orial



                           endometrium&/polypc       [facility]



                           w/wo d&c                  Center ([id number])

 

    



              2018   IMMUNOTHERAPY, SINGLE  no information  no name (no ph

one)  

[facility]



                           INJECTION                 [facility] (61105)

 

    



              2019   LAB NOT BILLED BY  no information  no name (no phone)

  [facility] ([id number])

 

    



              2018   LAB NOT BILLED BY  no information  no name (no phone)

  [facility] (46803)

 

    



              10-   LAB NOT BILLED BY  no information  no name (no phone)

  [facility] ([id number])

 

    



                 Measurement of Cardiac  no information  no name (no phone)  [facility]



                           Sampling and Pressure,    (63368)



                                         Left Heart,   



                                         Percutaneous Approach   

 

    



              2018   Prof svcs allg immntx  no information  no name (no ph

one)  

[facility]



                           x w/prv allgic xtrcs 1    [facility] (21555)

 

    



              2018   Urine albumin  no information  no name (no phone)  Co

[facility]



                           semiquantitative          [facility] (19456)



                                                                                
                                                                                
                                                                                
                                                                 



Immunizations

                      



    



              Normalized   Immunization Date  Notes        Care Provider  Facili

ty



                                         Immunization    

 

    



              influenza,   2018   no information  no name      [facility]

ealth



                           injectable,               [facility]



                           quadrivalent,             - [facility]



                           preservative free         (97936)

 

    



              influenza,   10-   no information  no name      [facility]

le



                           injectable,               (20920)



                                         quadrivalent,    



                                         preservative free    

 

    



              influenza, seasonal,  10-   no information  no name      [facility]



                           injectable                [facility]



                                         (22478)

 

    



              pneumococcal  2018 -  no information  no name      [facility]



                     conjugate vaccine,  2018          [facility]



                           13 valent                 - [facility]



                                         (05170)

 

    



              zoster vaccine  2019   no information  no name      [facility]

[facility]



                           recombinant               [facility]



                                         (64774)

 

    



              zoster vaccine  2019   no information  no name      [facility]



                           recombinant               [facility]



                                         (48839)

 

    



              no information  2018   no information  [facility] [id number]  C

[facility] ([id number])



                                                                                
                                                         



Results

                      



     



            Test Name  Value      Interpretation  Reference Range  Date Time  Fa

cility



                     (Normalized)        (Normalized)        (Medline  



                                         Reference)  

 

 



                                         xray : spine,



                                         cervical



                                         on



                                         null

 

     



                 NEGATED:        no information  (no code)       [facility]





                           Highlighted row           Center of



                           Laboratory                [facility]



                           studies (set)             (44286)

 

 



                                         microalbumin,



                                         urine (in house)



                                         on null

 

     



              Albumin mass  10 g/dL      (no code)    3.4 - 5.4 g/dL   [facility]



                           conc                      [facility]



                                         (09145)

 

     



                 MICROALBUMIN,   Normal          (no code)       [facility]





                           URINE (IN HOUSE)          [facility]



                                         (07726)

 

     



                 MICROALBUMIN,   184710          (no code)       [facility]





                           URINE (IN HOUSE)          [facility]



                                         (84904)

 

     



                 MICROALBUMIN,   2019         (no code)       [facility]

h



                           URINE (IN HOUSE)          [facility]



                                         (56264)

 

     



                 MICROALBUMIN,   clear           (no code)       [facility]

h



                           URINE (IN HOUSE)          [facility]



                                         (15920)

 

     



                 MICROALBUMIN,   dark yellow     (no code)       [facility]

h



                           URINE (IN HOUSE)          [facility]



                                         (18546)

 

     



                 MICROALBUMIN,   200             (no code)       [facility]

h



                           URINE (IN HOUSE)          [facility]



                                         (78578)

 

     



                 MICROALBUMIN,   <30             (no code)       [facility]

h



                           URINE (IN HOUSE)          [facility]



                                         (48143)

 

 



                                         a1c (in house)



                                         on null

 

     



              Hemoglobin   7.6 %        (no code)    0 - 5.7 %    [facility]



                           A1c/Hemoglobin.t          [facility]



                           fraction (Bld)            (20897)

 

     



                 A1C (IN HOUSE)  2020         (no code)       [facility]

[facility]



                                         (11761)

 

     



                 A1C (IN HOUSE)  0864            (no code)       [facility]



                                         (42582)

 

 



                                         No panel



                                         information



                                         on null

 

     



                 Pregnancy Status  (10966-0)       (no code)       [facility] (60811)



                                         Status: N    

 

     



                 Pregnancy Status  ~(96885-9)      (no code)       [facility] (31187)



                                         Status: N    

 

     



                 no information  (ENCNTRINFOAV) :  (no code)       no informatio

n



                                         No~([facility]_REVIEWED    



                                         ) :    



                                         [phone]~(    



                                         REGENCTRALRT) :    



                                         Yes~(REGENCTRCOM    



                                         M) :  @ 408    



                                         pt not available    



                                         at this    



                                         time~(ACCRELATED    



                                         VI) :    



                                         No~(CONVERS) :    



                                         [facility] [place]    



                                         Patient~([employer]    



                                         PLETE) :    



                                         [id number]~(    



                                         PREVPATTYPE) :    



                                         Ambulatory    



                                         Surgery~(CONVERS    



                                         ATION) : [facility]    



                                         PreReg~(FTRELTNC    



                                         VG) :    



                                         Freetext~(REALRE    



                                         LTNCVG) :    



                                         Real~(RELATINSTR    



                                         ) : Please    



                                         search using the    



                                         first and last    



                                         name in the    



                                         person search    



                                         box. If you    



                                         select add    



                                         person the first    



                                         and last name    



                                         will populate    



                                         the name fields    



                                         above.~(NOEMAIL)    



                                         : No    



                                         Email~(IQHREGIST    



                                         RA) : No, Not    



                                         Interested~(JASE    



                                         EBTBAL) :    



                                         $0.00~(SELFPAYBA    



                                         ELÍAS) :    



                                         $0.00~([facility]) : Yes    

 

     



                 no information  (72310-9) Sex   (no code)       [facility]



                           assigned at               [facility]



                           birth:                    [place]ace ()



                                         Female~(ENCNTRIN    



                                         FOAV) :    



                                         No~(MSP_REVIEWED    



                                         ) :    



                                         [phone]~(    



                                         ACCRELATEDVI) :    



                                         No~(CONVERS) :    



                                         [facility] [place]    



                                         Patient~([employer]    



                                         PLETE) :    



                                         [id number]~(    



                                         PREVPATTYPE) :    



                                         Preadmit~(CONVER    



                                         SATION) : [facility]    



                                         PreReg~(FTRELTNC    



                                         VG) :    



                                         Freetext~(REALRE    



                                         LTNCVG) :    



                                         Real~(RELATINSTR    



                                         ) : Please    



                                         search using the    



                                         first and last    



                                         name in the    



                                         person search    



                                         box. If you    



                                         select add    



                                         person the first    



                                         and last name    



                                         will populate    



                                         the name fields    



                                         above.~(NOEMAIL)    



                                         : No    



                                         Email~([employer]    



                                         RA) : No, Not    



                                         Interested~(JASE    



                                         EBTBAL) :    



                                         $0.00~(SELFPAYBA    



                                         ELÍAS) :    



                                         $0.00~([facility]) : Yes    

 

     



                 no information  (24433-7) Sex   (no code)       [facility]



                           assigned at               [facility]



                           birth:                    Wallace ()



                                         Female~(CONVERSA    



                                         TION) : [facility]    



                                         PreReg~(FTRELTNC    



                                         VG) :    



                                         Freetext~(REALRE    



                                         LTNCVG) :    



                                         Real~(RELATINSTR    



                                         ) : Please    



                                         search using the    



                                         first and last    



                                         name in the    



                                         person search    



                                         box. If you    



                                         select add    



                                         person the first    



                                         and last name    



                                         will populate    



                                         the name fields    



                                         above.~(NOEMAIL)    



                                         : No    



                                         Email~("[employer]HRBe[employer]IST    



                                         RA) : No, Not    



                                         Interested~(CITY    



                                         _CHECK) :    



                                         Yes~(PREFERPHONE    



                                         ) : Mobile Phone    



                                         Number~(SELFPAYB    



                                         SHENA) :    



                                         $0.00~(BADDEBTBA    



                                         L) : $0.00    

 

     



                 no information  (16560-0) Sex   (no code)       [facility]



                           assigned at               [facility]:                    [place]ace ([id number])



                                         Female~(ENCNTRIN    



                                         FOAV) :    



                                         No~(MSP_REVIEWED    



                                         ) :    



                                         [phone]~(    



                                         ACCRELATEDVI) :    



                                         No~(CONVERS) :    



                                         [facility] [place]    



                                         Patient~([employer]    



                                         PLETE) :    



                                         [id number]~(    



                                         PREVPATTYPE) :    



                                         Preadmit~(CONVER    



                                         SATION) : [facility]    



                                         PreReg~(FTRELTNC    



                                         VG) :    



                                         Freetext~(REALRE    



                                         LTNCVG) :    



                                         Real~(RELATINSTR    



                                         ) : Please    



                                         search using the    



                                         first and last    



                                         name in the    



                                         person search    



                                         box. If you    



                                         select add    



                                         person the first    



                                         and last name    



                                         will populate    



                                         the name fields    



                                         above.~(NOEMAIL)    



                                         : No    



                                         Email~(IQHREGIST    



                                         RA) : No, Not    



                                         Interested~(JASE    



                                         EBTBAL) :    



                                         $0.00~(SELFPAYBA    



                                         ELÍAS) :    



                                         $0.00~(CITY_CHE    



                                         K) :    



                                         Yes~(PREFERPHONE    



                                         ) : Mobile Phone    



                                         Number    

 

 



                                         No panel



                                         information



                                         on 2020

 

     



                 Exp date        2021         (no code)       [facility]

[facility]



                                         (34699)

 

     



                 Lot             6.2~6.2~0603    (no code)       [facility]



                                         (78689)

 

 



                                         No panel



                                         information



                                         on 2020

 

     



                 Bacteria        SEE NOTE        (no code)       [facility]



                           identified Cx             [facility] ([facility])                   [facility]



                                         (15933)

 

     



                 BLO             no information  (no code)       [facility]



                                         (87901)

 

     



                 KET             2021~clear~ye  (no code)       [facility]

[facility]



                           llow~no~neg~neg~          [facility]



                           neg                       [facility]



                                         (49375)

 

     



                 NAHEED             neg~neg         (no code)       [facility]



                                         (70068)

 

     



                 Lot #           [id number]          (no code)       [facility]



                                         (52989)

 

     



              pH (Bld)     5.0 [pH]     (no code)    7.38 - 7.42 [pH]   [facility]

y [facility]



                                         (27060)

 

     



              Protein (U)  no information  (no code)    0 - 20 mg/dL   [facility]



                           [Mass/Vol]                [facility]



                                         (69622)

 

     



                 SG              1.015           (no code)       [facility]

[facility]



                                         (16937)

 

     



                 URO             0.2             (no code)       [facility]

[facility]



                                         (57410)

 

 



                                         No panel



                                         information



                                         on 2019

 

     



                 Exp date        2021         (no code)       [facility]



                                         (79664)

 

     



                 Lot             6.2~6.7~0251    (no code)       [facility]



                                         (34917)

 

 



                                         No panel



                                         information



                                         on 2019-10-08

 

     



              Calcium      9.6 mg/dL    (N)          8.5 - 10.2 mg/dL   [facility]



                           [Mass/Vol]                [facility]



                                         (37172)

 

     



              Chloride     103 mmol/L   (N)          95 - 106 mmol/L   [facility]



                           [Moles/Vol]               [facility]



                                         (21935)

 

     



              CO2 [Moles/Vol]  26 mmol/L    (N)          23 - 29 mmol/L   [facility]



                                         (61158)

 

     



                 Creatinine      0.99 mg/dL      (N)             [facility]



                           [Mass/Vol]                [facility]



                                         (54264)

 

     



              GFR/1.73 sq M  68           (N)          90 - 120     [facility]



                 predicted among  mL/min/{1.73_m2}   mL/min/{1.73_m2}   [facility]



                           blacks MDRD               [facility]



                           (S/P/Bld) [Vol            (20040)



                                         rate/Area]     

 

     



              GFR/1.73 sq  59           (L)          90 - 120     [facility].predicted MDRD  mL/min/{1.73_m2}   mL/min/{1.73_m2}   [facility]



                           (S/P/Bld) [Vol            [facility]



                           rate/Area]                (00702)

 

     



              Glucose      82 mg/dL     (N)          60 - 125 mg/dL   [facility]



                           [Mass/Vol]                [facility]



                                         (79747)

 

     



              Magnesium    1.7 mg/dL    (N)          1.7 - 2.2 mg/dL   [facility]



                           [Mass/Vol]                [facility]



                                         (02688)

 

     



              Potassium    4.8 mmol/L   (N)          3.7 - 5.2 mmol/L   [facility]

[facility]



                           [Moles/Vol]               [facility]



                                         (89968)

 

     



              Sodium       139 mmol/L   (N)          135 - 145 mmol/L   [facility]

[facility]



                           [Moles/Vol]               [facility]



                                         (16208)

 

     



              TSH Qn       2.54 m[IU]/L  (N)          0.4 - 4 m[IU]/L   [facility]



                                         (89071)

 

     



              Urea nitrogen  31 mg/dL     (H)          7 - 20 mg/dL   [facility] 

Health



                           [Mass/Vol]                [facility]



                                         (98622)

 

     



              Urea         31 mg/mg     (H)          6 - 22 mg/mg   [facility]



                           nitrogen/Creatin          [facility] [Mass ratio]          [facility]



                                         (09251)

 

 



                                         No panel



                                         information



                                         on 2019

 

     



                 Amphetamines Ql  no information  (N)             [facility]

[facility]



                           (U)                       [facility]



                                         (30362)

 

     



                 Barbiturates Ql  no information  (N)             [facility]



                           (U)                       [facility]



                                         (50224)

 

     



                 Benzodiazepines  no information  (N)             [facility]



                           Ql (U)                    [facility]



                                         (69429)

 

     



                 Benzoylecgonine  no information  (N)             [facility]

[facility]



                           Ql (U)                    [facility]



                                         (04843)

 

     



                 Codeine (U)     no information  (N)             [facility]

[facility]



                           [Mass/Vol]                [facility]



                                         (04707)

 

     



                 COMMENT         no information  (no code)       [facility]

[facility]



                                         [facility]



                                         (70691)

 

     



                 Creatinine (U)  127.3 mg/dL     (N)             [facility]

[facility]



                           [Mass/Vol]                [facility]



                                         (71551)

 

     



                 Drug screen     INCONSISTENT    (A)             [facility]

[facility]



                           comment (U)               [facility]



                           [Interp]                  [facility]



                                         (24038)

 

     



                 Drug screen     CONSISTENT      (N)             [facility]

[facility]



                           comment (U)               [facility]



                           [Inter]                  [facility]



                                         (03944)

 

     



                 Drug screen     See Note        (A)             [facility]

[facility]



                           comment (U)               [facility]



                           [Interp]                  [facility]



                                         (60730)

 

     



                 Hydrocodone (U)  1395            (H)             [facility]

[facility]



                           [Mass/Vol]                [facility]



                                         (08853)

 

     



                 Hydromorphone   no information  (N)             [facility]

[facility]



                           (U) [Mass/Vol]            [facility]



                                         (96980)

 

     



                 Methadone Ql (U)  no information  (N)             [facility]



                                         (63076[facility]

 

     



                 Morphine (U)    no information  (N)             [facility]

[facility]



                           [Mass/Vol]                [facility]



                                         (30299)

 

     



                 Norhydrocodone  807             (H)             [facility]

[facility]



                           Confirm (U)               [facility]



                           [Mass/Vol]                [facility]



                                         (50285)

 

     



                 Opiates Ql (U)  no information  (A)             [facility]



                                         (32551)

 

     



                 Oxidants Ql (U)  no information  (N)             [facility]



                                         (75100)

 

     



                 Oxycodone Ql (U)  no information  (N)             [facility]



                                         (11592)

 

     



              pH (U)       5.2 [pH]     (N)          4.6 - 8 [pH]   [facility]



                                         (84050)

 

     



                 Phencyclidine Ql  no information  (N)             [facility]

lt



                           (U)                       [facility]



                                         (38056)

 

     



                 Tetrahydrocannab  no information  (N)             [facility]



                           inol Ql (U)               [facility]



                                         (38312)

 

 



                                         No panel



                                         information



                                         on 2019

 

     



              Calcium      9.7 mg/dL    (N)          8.5 - 10.2 mg/dL   [facility]



                           [Mass/Vol]                [facility]



                                         (06239)

 

     



              Chloride     101 mmol/L   (N)          95 - 106 mmol/L   [facility]



                           [Moles/Vol]               [facility]



                                         (00119)

 

     



              CO2 [Moles/Vol]  26 mmol/L    (N)          23 - 29 mmol/L   [facility]



                                         (27863)

 

     



                 Creatinine      0.93 mg/dL      (N)             [facility]



                           [Mass/Vol]                [facility]



                                         (92239)

 

     



              GFR/1.73 sq M  74           (N)          90 - 120     [facility]



                 predicted among  mL/min/{1.73_m2}   mL/min/{1.73_m2}   [facility]

[facility]



                           blacks MDRD               [facility]



                           (S/P/Bld) [Vol            (36891)



                                         rate/Area]     

 

     



              GFR/1.73 sq  64           (N)          90 - 120     [facility].predicted MDRD  mL/min/{1.73_m2}   mL/min/{1.73_m2}   [facility]



                           (S/P/Bld) [Vol            [facility]



                           rate/Area]                (15277)

 

     



              Glucose      163 mg/dL    (H)          60 - 125 mg/dL   [facility]



                           [Mass/Vol]                [facility]



                                         (88500)

 

     



              Potassium    5.4 mmol/L   (H)          3.7 - 5.2 mmol/L   [facility]



                           [Moles/Vol]               [facility]



                                         (42806)

 

     



              Sodium       134 mmol/L   (L)          135 - 145 mmol/L   [facility]



                           [Moles/Vol]               [facility]



                                         (48652)

 

     



              Urea nitrogen  30 mg/dL     (H)          7 - 20 mg/dL   [facility]



                           [Mass/Vol]                [facility]



                                         (52094)

 

     



              Urea         32 mg/mg     (H)          6 - 22 mg/mg   [facility]



                           nitrogen/Creatin          [facility] [Mass ratio]          [facility]



                                         (37239)

 

 



                                         No panel



                                         information



                                         on 2019

 

     



            Calcium    8.7 mg/dL  (no code)  8.5 - 10.2 mg/dL  2019  [facility]



                     [Mass/Vol]          03:          [facility] (73211)

 

     



            Chloride   100 mmol/L  (no code)  95 - 106 mmol/L  2019  [facility]



                     [Moles/Vol]         03:          [facility] (36589)

 

     



            CO2 [Moles/Vol]  27 mmol/L  (no code)  23 - 29 mmol/L  2019  [facility]



                           03:                [facility] (55024)

 

     



              Creatinine   1.29 mg/dL   (H)          2019   [facility]



                     [Mass/Vol]          03:          [facility] (79579)

 

     



            GFR/1.73 sq M  0          (L)        90 - 120   2019  [facility]

eart



              predicted among  mL/min/{1.73_m2}   mL/min/{1.73_m2}  03:  

 [facility] 

(95499)



                                         non-blacks MDRD     



                                         (S/P/Bld) [Vol     



                                         rate/Area]     

 

     



            Glucose    154 mg/dL  (H)        60 - 125 mg/dL  2019  [facility]



                     [Mass/Vol]          03:          [facility] (21378)

 

     



            Hematocrit (Bld)  38.0 %     (no code)  36.1 - 50.3 %  2019  [facility]



                     [Volume             03:          [facility] ()



                                         fraction]     

 

     



            Hemoglobin (Bld)  11.5 g/dL  (L)        12.1 - 17.2 g/dL  2019

  [facility]



                     [Mass/Vol]          03:          Hospital (64645)

 

     



            MCH (RBC)  27 pg      (no code)  27 - 31 pg  2019  [facility]

t



                     [Entitic mass]      03:          Hospital (83149)

 

     



              MCHC (RBC)   30.3         (L)          2019   [facility]



                     [Mass/Vol]          03:          Hospital (33432)

 

     



            MCV (RBC)  89.8 fL    (no code)  80 - 100 fL  2019  [facility]

rt



                     [Entitic vol]       03:          Hospital (98290)

 

     



              Platelets (Bld)  374          (no code)    2019   [facility]

[facility]



                     [#/Vol]             03:          Hospital (28467)

 

     



            Potassium  4.5 mmol/L  (no code)  3.7 - 5.2 mmol/L  2019  [facility]



                     [Moles/Vol]         03:          Hospital (72225)

 

     



              RBC (Bld)    4.23         (no code)    2019   [facility]



                     [#/Vol]             03:          Hospital (00361)

 

     



            Sodium     137 mmol/L  (no code)  135 - 145 mmol/L  2019  [facility]



                     [Moles/Vol]         03:          Hospital (58176)

 

     



            Urea nitrogen  32 mg/dL   (H)        7 - 20 mg/dL  2019  [facility]



                     [Mass/Vol]          03:          Hospital (39877)

 

     



              WBC (Bld)    8.8          (no code)    2019   [facility]



                     [#/Vol]             03:          Hospital (04556)

 

 



                                         No panel



                                         information



                                         on 2019

 

     



              ACCUCHEK     140          (H)          2019   [facility]



                           12:                Hospital (54979)

 

     



              ACCUCHEK     161          (H)          2019   [facility]



                           16:                Hospital (22214)

 

     



              ACCUCHEK     226          (H)          2019   [facility]



                           21:57                Hospital (59504)

 

     



            Calcium    8.9 mg/dL  (no code)  8.5 - 10.2 mg/dL  2019  [facility]



                     [Mass/Vol]          05:48          Hospital (06534)

 

     



            Chloride   102 mmol/L  (no code)  95 - 106 mmol/L  2019  [facility]



                     [Moles/Vol]         05:48          [facility] (62645)

 

     



            CO2 [Moles/Vol]  34 mmol/L  (H)        23 - 29 mmol/L  2019  [facility]



                           05:                [facility]ospital (18106)

 

     



              Creatinine   1.35 mg/dL   (H)          2019   [facility]



                     [Mass/Vol]          05:          Hospital (12677)

 

     



            GFR/1.73 sq M  0          (L)        90 - 120   2019  [facility]

eart



              predicted among  mL/min/{1.73_m2}   mL/min/{1.73_m2}  05:  

 [facility] 

(29711)



                                         non-blacks MDRD     



                                         (S/P/Bld) [Vol     



                                         rate/Area]     

 

     



            Glucose    141 mg/dL  (H)        60 - 125 mg/dL  2019  [facility]



                     [Mass/Vol]          05:48          Hospital (33330)

 

     



            Magnesium  1.9 mg/dL  (no code)  1.7 - 2.2 mg/dL  2019  [facility]



                     [Mass/Vol]          05:48          [facility] (50425)

 

     



            Potassium  3.8 mmol/L  (no code)  3.7 - 5.2 mmol/L  2019  [facility]

as Heart



                     [Moles/Vol]         05:          Hospital (18024)

 

     



            Sodium     140 mmol/L  (no code)  135 - 145 mmol/L  2019  [facility]

[facility]



                     [Moles/Vol]         05:          Hospital (32581)

 

     



            Urea nitrogen  23 mg/dL   (H)        7 - 20 mg/dL  2019  [facility]



                     [Mass/Vol]          05:48          [facility] (05650)

 

 



                                         No panel



                                         information



                                         on 2019

 

     



              ACCUCHEK     149          (H)          2019   [facility]



                           21:                [facility] (38347)

 

     



            Albumin    3.6 g/dL   (no code)  3.4 - 5.4 g/dL  2019  [facility]



                     [Mass/Vol]          15:          Hospital (67298)

 

     



              ALK PHOS     119          (H)          2019   [facility]



                           15:                Hospital (60536)

 

     



            ALT [Catalytic  17 U/L     (no code)  4 - 40 U/L  2019  [facility]

 [facility]



                     activity/Vol]       15:          Hospital (27851)

 

     



              Bilirubin Ql (U)  0.6          (no code)    2019   [facility]

[facility]



                           15:                Hospital (44779)

 

     



            Calcium    9.7 mg/dL  (no code)  8.5 - 10.2 mg/dL  2019  [facility]



                     [Mass/Vol]          15:          Hospital (92147)

 

     



              CARD RISK    2            (no code)    2019   [facility]



                           15:                Hospital (10323)

 

     



            Chloride   100 mmol/L  (no code)  95 - 106 mmol/L  2019  [facility]

s [facility]



                     [Moles/Vol]         15:          Hospital (48399)

 

     



            Cholesterol  107 mg/dL  (no code)  180 - 200 mg/dL  2019  [facility]

[facility]



                     [Mass/Vol]          15:          Hospital (47886)

 

     



              Cholesterol in  49 mg/dL     (no code)    2019   [facility]

t



                     HDL [Mass/Vol]      15:          Hospital (80391)

 

     



            Cholesterol in  44.0 mg/dL  (no code)  0 - 100 mg/dL  2019  [facility]

[facility]



                     LDL [Mass/Vol]      15:          Hospital (91986)

 

     



              Cholesterol in  14.0 mg/dL   (no code)    2019   [facility]

t



                     VLDL [Mass/Vol]     15:          Hospital (43515)

 

     



              Clarity (U)  CLEAR        (no code)    2019   [facility]



                           15:                Hospital (85101)

 

     



            CO2 [Moles/Vol]  31 mmol/L  (no code)  23 - 29 mmol/L  2019  [facility]



                           15:                [facility]ospital (65110)

 

     



              Color (U)    YELLOW       (no code)    2019   [facility]



                           15:                Hospital (83517)

 

     



              Creatinine   0.90 mg/dL   (no code)    2019   [facility]



                     [Mass/Vol]          15:          Hospital (92151)

 

     



            GFR/1.73 sq M  0          (no code)  90 - 120   2019  [facility]

[facility]



              predicted among  mL/min/{1.73_m2}   mL/min/{1.73_m2}  15:  

 Hospital 

(15251)



                                         non-blacks MDRD     



                                         (S/P/Bld) [Vol     



                                         rate/Area]     

 

     



            Glucose    140 mg/dL  (H)        60 - 125 mg/dL  2019  [facility]



                     [Mass/Vol]          15:          Hospital (01295)

 

     



            HbA1c (Bld)  7.4 %      (H)        0 - 5.7 %  2019  [facility]

[facility]



                     [Mass fraction]     15:          Hospital (03689)

 

     



            Hematocrit (Bld)  37.9 %     (L)        36.1 - 50.3 %  2019  [facility]



                     [Volume             15:          Hospital (36929)



                                         fraction]     

 

     



            Hemoglobin (Bld)  11.6 g/dL  (L)        12.1 - 17.2 g/dL  2019

  [facility]



                     [Mass/Vol]          15:          Hospital (87102)

 

     



              HOLD         ARC          (no code)    2019   [facility]



                           15:                [facility] ()

 

     



            Magnesium  1.9 mg/dL  (no code)  1.7 - 2.2 mg/dL  2019  [facility]



                     [Mass/Vol]          15:          Hospital (05783)

 

     



            MCH (RBC)  27 pg      (no code)  27 - 31 pg  2019  [facility]

t



                     [Entitic mass]      15:          Hospital (29714)

 

     



              MCHC (RBC)   30.6         (L)          2019   [facility]



                     [Mass/Vol]          15:          Hospital (35653)

 

     



            MCV (RBC)  89.6 fL    (no code)  80 - 100 fL  2019  [facility]

[facility]



                     [Entitic vol]       15:          Hospital (58377)

 

     



              Platelets (Bld)  331          (no code)    2019   [facility]

rt



                     [#/Vol]             15:30          Hospital (00429)

 

     



            Potassium  4.0 mmol/L  (no code)  3.7 - 5.2 mmol/L  2019  [facility]

[facility]



                     [Moles/Vol]         15:          Hospital (32837)

 

     



            Protein (U)  no information  (no code)  0 - 20 mg/dL  2019  [facility]

[facility]



                     [Mass/Vol]          15:          Hospital (74067)

 

     



            Protein    7.9 g/dL   (no code)  6.4 - 8.3 g/dL  2019  [facility]



                     [Mass/Vol]          15:          Hospital (58144)

 

     



              RBC (Bld)    4.23         (no code)    2019   [facility]



                     [#/Vol]             15:          Hospital (26853)

 

     



              SGOT(AST)    14           (L)          2019   [facility]



                           15:                Hospital (61512)

 

     



            Sodium     138 mmol/L  (no code)  135 - 145 mmol/L  2019  [facility]

as Heart



                     [Moles/Vol]         15:          Hospital (79500)

 

     



            Triglyceride  70 mg/dL   (no code)  0 - 150 mg/dL  2019  [facility]

s Heart



                     [Mass/Vol]          15:          Hospital (22895)

 

     



              TROP I       <0.02        (no code)    2019   [facility]



                           15:                Hospital (30765)

 

     



              UR UROBILINOGEN  0.2 E.U./dL  (no code)    2019   [facility]

rt



                     DIP                 15:          Hospital (36948)

 

     



            Urea nitrogen  15 mg/dL   (no code)  7 - 20 mg/dL  2019  [facility]

s Heart



                     [Mass/Vol]          15:          Hospital (96971)

 

     



              URINE BILIRUBIN  no information  (no code)    2019   [facility]



                     DIP                 15:          Hospital (69733)

 

     



              URINE BLOOD DIP  no information  (no code)    2019   [facility]



                           15:                Hospital (11252)

 

     



              URINE GLUC DIP  no information  (no code)    2019   [facility]

[facility]



                           15:                Hospital (33459)

 

     



              URINE KETONES  no information  (no code)    2019   [facility]

art



                     DIP                 15:57040          Hospital (12990)

 

     



              URINE LEUKOCYTE  no information  (no code)    2019   [facility]



                     DIP                 15:57040          Hospital (80183)

 

     



              URINE NITRITE  no information  (no code)    2019   [facility]

art



                     DIP                 15:57040          Hospital (28051)

 

     



              URINE pH DIP  7.0          (no code)    2019   [facility]



                           15:57040                Hospital (46980)

 

     



              URINE SP GRAVITY  1.015        (no code)    2019   [facility]

art



                     DIP                 15:57040          Hospital (12396)

 

     



              WBC (Bld)    8.1          (no code)    2019   [facility]



                     [#/Vol]             15:          [facility] (47254)

 

 



                                         No panel



                                         information



                                         on 2019

 

     



              Free T4      0.9 ng/dL    (N)          0.9 - 2.2 ng/dL   [facility]



                           [Mass/Vol]                [facility]



                                         (14702)

 

     



              Thyroglobulin Ab  [IU]/mL      (N)          0 - 20 [IU]/mL   [facility]



                           Qn                        [facility]



                                         (19285)

 

     



              TPO Ab Qn    [IU]/mL      (N)          0 - 35 [IU]/mL   [facility]



                                         ([id number])

 

     



              TSH Qn       3.02 m[IU]/L  (N)          0.4 - 4 m[IU]/L   [facility]



                                         (71891)

 

 



                                         No panel



                                         information



                                         on 2019

 

     



              Albumin      4.1 g/dL     (N)          3.4 - 5.4 g/dL   [facility]



                           [Mass/Vol]                [facility]



                                         (74767)

 

     



                 Albumin/Globulin  1.7             (N)             [facility]

lth



                           [Mass ratio]              [facility]



                                         (00446)

 

     



              ALP [Catalytic  93 U/L       (N)          44 - 147 U/L   [facility]

 Health



                           activity/Vol]             [facility]



                                         (74499)

 

     



              ALT [Catalytic  12 U/L       (N)          4 - 40 U/L   [facility]



                           activity/Vol]             [facility]



                                         (80226)

 

     



              AST [Catalytic  10 U/L       (N)          10 - 34 U/L   [facility]



                           activity/Vol]             [facility]



                                         (76858)

 

     



              Basophils (Bld)  0.188 10*3/uL  (N)          0 - 0.3 10*3/uL   [facility]



                           [#/Vol]                   [facility]



                                         (49770)

 

     



              Basophils/100  2.0 %        (N)          0.5 - 1 %    [facility]

[facility]



                           WBC (Bld)                 [facility]



                                         (38014)

 

     



              Bilirubin    0.6 mg/dL    (N)          0.1 - 1.2 mg/dL   [facility]



                           [Mass/Vol]                [facility]



                                         (67132)

 

     



              Calcium      9.5 mg/dL    (N)          8.5 - 10.2 mg/dL   [facility]



                           [Mass/Vol]                [facility]



                                         (81305)

 

     



              Chloride     102 mmol/L   (N)          95 - 106 mmol/L   [facility]



                           [Moles/Vol]               [facility]



                                         (23112)

 

     



              CO2 [Moles/Vol]  30 mmol/L    (N)          23 - 29 mmol/L   [facility]



                                         (81485)

 

     



                 Creatinine      0.87 mg/dL      (N)             [facility]

[facility]



                           [Mass/Vol]                [facility]



                                         (07450)

 

     



              Eosinophils  0.282 10*3/uL  (N)          0.05 - 0.5   [facility]

alth



                     (Bld) [#/Vol]       10*3/uL             [facility]



                                         (89860)

 

     



              Eosinophils/100  3.0 %        (N)          1 - 4 %      [facility]



                           WBC (Bld)                 [facility]



                                         (37335)

 

     



              Erythrocyte  12.6 %       (N)          11.6 - 14.6 %   [facility]

ealth



                           distribution              [facility] (RBC)               [facility]



                           [Ratio]                   (65944)

 

     



                 Exp date        2020         (no code)       [facility]

h



                                         [facility]



                                         (76065)

 

     



              GFR/1.73 sq M  80           (N)          90 - 120     [facility]

alth



                 predicted among  mL/min/{1.73_m2}   mL/min/{1.73_m2}   [facility]



                           blacks MDRD               [facility]



                           (S/P/Bld) [Vol            (20076)



                                         rate/Area]     

 

     



              GFR/1.73 sq  69           (N)          90 - 120     [facility]



                 M.predicted MDRD  mL/min/{1.73_m2}   mL/min/{1.73_m2}   [facility]



                           (S/P/Bld) [Vol            [facility]



                           rate/Area]                (20594)

 

     



                 Globulin (S)    2.4             (N)             [facility]



                           [Mass/Vol]                [facility]



                                         (06921)

 

     



              Glucose      112 mg/dL    (N)          60 - 125 mg/dL   [facility]



                           [Mass/Vol]                [facility]



                                         (59839)

 

     



              Hematocrit (Bld)  38.2 %       (N)          36.1 - 50.3 %   [facility]



                           [[facility]]                 [facility]



                                         (90567)

 

     



              Hemoglobin (Bld)  12.5 g/dL    (N)          12.1 - 17.2 g/dL   [facility]



                           [Mass/Vol]                [facility]



                                         (50417)

 

     



                 Lot             6.7~6.6~0953    (no code)       [facility]



                                         (85358)

 

     



                 Lymphocytes     1880            (N)             [facility]



                           (Bld) [#/Vol]             [facility]



                                         (34198)

 

     



              Lymphocytes/100  20.0 %       (N)          20 - 40 %    [facility]



                           WBC (Bld)                 [facility]



                                         (53023)

 

     



              MCH (RBC)    28.5 pg      (N)          27 - 31 pg   [facility]



                           [Entitic mass]            [facility]



                                         (11194)

 

     



              MCHC (RBC)   32.7 g/dL    (N)          32 - 36 g/dL   [facility]

alth



                           [Mass/Vol]                [facility]



                                         (70210)

 

     



              MCV (RBC)    87.0 fL      (N)          80 - 100 fL   [facility]

lt



                           [Entitic vol]             [facility]



                                         (91761)

 

     



              Monocytes (Bld)  0.846 10*3/uL  (N)          0.3 - 0.9    [facility]



                     [#/Vol]             10*3/uL             [facility]



                                         (85890)

 

     



              Monocytes/100  9.0 %        (N)          2 - 8 %      [facility]



                           WBC (Bld)                 [facility]



                                         (47025)

 

     



                 Morphology Chepe  no information  (N)             [facility]

[facility]



                           (Bld) [Interp]            [facility]



                                         (22421)

 

     



              Neutrophils  6.204 10*3/uL  (N)          1.7 - 7 10*3/uL   [facility]



                           (Bld) [#/Vol]             [facility]



                                         (68120)

 

     



              Neutrophils/100  66.0 %       (N)          40 - 60 %    [facility] 

Health



                           WBC (Bld)                 [facility]



                                         (42194)

 

     



              Platelet mean  10.6 fL      (N)          7.2 - 11.7 fL   [facility]



                           volume (Bld)              [facility]



                           [Entitic vol]             [facility]



                                         (75707)

 

     



              Platelets (Bld)  332 10*3/uL  (N)          150 - 450    [facility] 

Health



                     [#/Vol]             10*3/uL             [facility]



                                         (51213)

 

     



                 Platelets LM Ql  ADEQUATE        (N)             [facility]

th



                           (Bld)                     [facility]



                                         (20108)

 

     



              Potassium    4.8 mmol/L   (N)          3.7 - 5.2 mmol/L   [facility]



                           [Moles/Vol]               [facility]



                                         (66998)

 

     



              Protein      6.5 g/dL     (N)          6.4 - 8.3 g/dL   [facility]



                           [Mass/Vol]                [facility]



                                         (32952)

 

     



              RBC (Bld)    4.39 10*6/uL  (N)          4.2 - 6.1    [facility]

lth



                     [#/Vol]             10*6/uL             [facility]



                                         (60835)

 

     



              Sodium       140 mmol/L   (N)          135 - 145 mmol/L   [facility]



                           [Moles/Vol]               [facility]



                                         (59605)

 

     



              Urea nitrogen  26 mg/dL     (H)          7 - 20 mg/dL   [facility]



                           [Mass/Vol]                [facility]



                                         (64161)

 

     



              Urea         30 mg/mg     (H)          6 - 22 mg/mg   [facility]

alth



                           nitrogen/Creatin          [facility] [Mass ratio]          [facility]



                                         (69755)

 

     



              WBC (Bld)    9.4 10*3/uL  (N)          3.5 - 10.5   [facility]

th



                     [#/Vol]             10*3/uL             [facility]



                                         (23933)

 

 



                                         No panel



                                         information



                                         on 2018

 

     



                 Bacteria        SEE NOTE        (A)             [facility]

[facility]



                           identified Aer            [facility]



                           cx Nom (Pending sale to [facility]



                           spec)                     ([id number])

 

     



                 Bacteria        SEE NOTE        (no code)       [facility]

[facility]



                           identified Anaer          [facility]



                           cx Nom (Pending sale to [facility]



                           spec)                     ([id number])

 

 



                                         No panel



                                         information



                                         on 2018

 

     



                 A:C (IN HOUSE)  <30             (no code)       no information

 

     



                 Color Nom (U)   2019~clear~da  (no code)       no informatio

n



                                         rk yellow    

 

     



                 CRE             10~200          (no code)       no information

 

     



                 Exp date        2020         (no code)       no information

 

     



                 Lot             7.6~6.9~0864    (no code)       no information

 

     



                 Lot #           [id number]          (no code)       no information

 

     



                 MICROALBUMIN    Normal          (no code)       no information

 

 



                                         No panel



                                         information



                                         on 2018

 

     



              Final        no information  (no code)    2018   no informat

ion



                                         08: 

 

 



                                         No panel



                                         information



                                         on 2018

 

     



              [facility]          no information  (no code)    2018   [facility]



                           08:070400                [facility] (46661)

 

 



                                         No panel



                                         information



                                         on 2018

 

     



            Albumin mass  3.4 g/dL   (L)        3.4 - 5.4 g/dL  2018  [facility]



                     conc                08:          [facility] (19538)

 

     



            Albumin/Globulin  0.8 {ratio}  (no code)  1 - 2.5 {ratio}  

8  [facility]



                     mass ratio          08:          [facility] (31082)

 

     



              Alk Phos     133          (H)          2018   [facility]

[facility]



                           08:510400                [facility] (03028)

 

     



              ALT/SGPT     26           (no code)    2018   [facility]

l



                           08:                [facility] (49807)

 

     



            Anion gap 3  10.9 mmol/L  (no code)  3 - 11 mmol/L  2018  [facility]



                     molar conc          08:          [facility] (49902)

 

     



              AST/SGOT     18           (no code)    2018   [facility]

l



                           08:                [facility] (37909)

 

     



              Bili Total   0.5          (no code)    2018   [facility]

[facility]



                           08:                [facility] (53896)

 

     



              Breakpoint Hemo  ********     (no code)    2018   [facility]

orial



                           08:32040                [facility] (36239)

 

     



              Breakpoint UA  ********     (no code)    2018   [facility]



                           08:45040                [facility] (29280)

 

     



              Breakpoint UA  *******      (no code)    2018   [facility]

[facility]



                           08:45040                [facility] (41408)

 

     



            Calcium mass  8.8 mg/dL  (no code)  8.5 - 10.2 mg/dL  2018  [facility]



                     conc                08:51040          [facility] (63323)

 

     



            Chloride molar  102 mmol/L  (no code)  95 - 106 mmol/L  2018  

[facility]



                     conc                08:51040          [facility] (85268)

 

     



            CO2 molar conc  26.1 mmol/L  (no code)  23 - 29 mmol/L  2018  

[facility]



                           08:51040                [facility] (87486)

 

     



              Color Nom (U)  Yellow       (no code)    2018   [facility]

[facility]



                           08:45                [facility] (30904)

 

     



              Creatinine Level  1.16         (H)          2018   [facility]

morial



                           08:51040                [facility] (91941)

 

     



              eGFR AA      56.6         (L)          2018   [facility]

[facility]



                           08:51040                [facility] (23581)

 

     



              eGFR Non AA  46.7         (L)          2018   [facility]

[facility]



                           08:51040                [facility] (63281)

 

     



            Erythrocyte  12.8 %     (no code)  11.6 - 14.6 %  2018  [facility]



                     distribution        08:320400          [facility] Auto Ratio          Center (74611)



                                         (RBC)     

 

     



            Glucose mass  155 mg/dL  (H)        60 - 125 mg/dL  2018  [facility]



                     conc                08:510400          [facility] (28161)

 

     



            Glucose mass  no information  (null)     0 - 15 mg/dL  2018  N

[facility]



                     conc (U)            08:450400          [facility] (38616)

 

     



            Hematocrit Auto  41.1 %     (no code)  36.1 - 50.3 %  2018  [facility]



                     Volume Fraction     08:          [facility]



                           (Bld)                     Center (14620)

 

     



            Hemoglobin mass  12.8 g/dL  (no code)  12.1 - 17.2 g/dL  2018 

 [facility]



                     conc (Bld)          08:          [facility] (68228)

 

     



              Instr WBC    8.8          (no code)    2018   [facility]

l



                           08:                [facility] (91685)

 

     



              Ketones Ql (U)  no information  (null)       2018   [facility]

[facility]



                           08:450400                [facility] (50479)

 

     



            MCH Auto Entitic  28.4 pg    (no code)  27 - 31 pg  2018  [facility]



                     mass (RBC)          08:          [facility] (45907)

 

     



            MCHC Auto mass  31.1 g/dL  (L)        32 - 36 g/dL  2018  [facility]



                     conc (RBC)          08:          [facility] (52690)

 

     



            MCV Auto Entitic  91.1 fL    (no code)  80 - 100 fL  2018  [facility]



                     volume (RBC)        08:320400          [facility] (51412)

 

     



              Osmolality   285.1        (no code)    2018   [facility]

l



                           08:                [facility] (59226)

 

     



            Platelet mean  9.6 fL     (no code)  7.2 - 11.7 fL  2018  [facility]



                     volume Auto         08:320400          [facility]



                           Entitic volume            Center (46802)



                                         (Bld)     

 

     



              Platelets Auto  282          (no code)    2018   [facility]

rial



                     #/vol (Bld)         08:320400          [facility] (08107)

 

     



            Potassium molar  4.6 mmol/L  (no code)  3.7 - 5.2 mmol/L  2018

  [facility]



                     conc                08:          [facility] (99717)

 

     



            Protein mass  7.7 g/dL   (no code)  6.4 - 8.3 g/dL  2018  [facility]



                     conc                08:          [facility] (29190)

 

     



              RBC Auto #/vol  4.5          (no code)    2018   [facility]

rial



                     (Bld)               08:          [facility] (33352)

 

     



            Sodium molar  139 mmol/L  (no code)  135 - 145 mmol/L  2018  [facility]



                     conc                08:          [facility] (63059)

 

     



              UA Amorph.   Occasional   (no code)    2018   [facility]

l



                           08:450400                [facility] (46233)

 

     



              UA Bacteria  1+           (no code)    2018   [facility]

l



                           08:                [facility] (90090)

 

     



              UA Bilirubin  no information  (null)       2018   [facility]

orial



                           08:450400                [facility] (32793)

 

     



              UA Blood     no information  (null)       2018   [facility]

rial



                           08:45040                [facility] (39771)

 

     



              UA Clarity   Hazy         (no code)    2018   [facility]

l



                           08:450400                [facility] (88304)

 

     



              UA Leuk Est  TRACE        (null)       2018   [facility]

l



                           08:                [facility] (09432)

 

     



              UA Mucous    1+           (no code)    2018   [facility]

l



                           08:                [facility] (59922)

 

     



              UA Nitrite   no information  (null)       2018   [facility]

rial



                           08:450400                [facility] (78165)

 

     



              UA pH        6.0          (null)       2018   [facility]

l



                           08:                [facility] (20060)

 

     



              UA Protein   no information  (null)       2018   [facility]

rial



                           08:                [facility] (90320)

 

     



              UA RBC       0-5          (no code)    2018   [facility]

l



                           08:                [facility] (00034)

 

     



              UA Spec Grav  1.020        (no code)    2018   [facility]

al



                           08:                [facility] (28330)

 

     



              UA Squam Epi  2+           (no code)    2018   [facility]

al



                           08:                [facility] (67415)

 

     



              UA Urobilinogen  0.2          (null)       2018   [facility]

[facility]



                           08:450400                [facility] (45097)

 

     



              UA WBC       5-10         (no code)    2018   [facility]

l



                           08:450400                [facility] (69615)

 

     



            Urea nitrogen  25 mg/dL   (H)        7 - 20 mg/dL  2018  [facility]

o [facility]



                     mass conc           08:510400          [facility] (03410)

 

     



            Urea       22 mg/mg   (H)        6 - 22 mg/mg  2018  [facility]

[facility]



                     nitrogen/Creatin     08:          [facility] mass ratio            [place] (09542)

 

     



              WBC Auto #/vol  8.8          (no code)    2018   [facility]

rial



                     (Bld)               08:32          [facility] (99525)

 

 



                                         No panel



                                         information



                                         on 2018

 

     



              Albumin mass  3.8 g/dL     (N)          3.4 - 5.4 g/dL   [facility]



                                         (56463)

 

     



                 Albumin/Globulin  1.3             (N)             [facility]



                           mass [facility]



                                         (76572)

 

     



              ALP enzyme   118 U/L      (N)          44 - 147 U/L   [facility]



                           act/vol                   [facility]



                                         (50680)

 

     



              ALT enzyme   13 U/L       (N)          4 - 40 U/L   [facility]



                           act/vol                   [facility]



                                         (65852)

 

     



              AST enzyme   15 U/L       (N)          10 - 34 U/L   [facility]



                           act/vol                   [facility]



                                         (57603)

 

     



              Bilirubin mass  0.5 mg/dL    (N)          0.1 - 1.2 mg/dL   [facility]



                                         (58131)

 

     



              Calcium mass  9.1 mg/dL    (N)          8.5 - 10.2 mg/dL   [facility]



                                         (74290)

 

     



              Chloride molar  105 mmol/L   (N)          95 - 106 mmol/L   [facility]



                                         (19616)

 

     



              CO2 molar conc  26 mmol/L    (N)          23 - 29 mmol/L   [facility]



                                         (83429)

 

     



                 Creatinine mass  0.85 mg/dL      (N)             [facility]

[facility]



                           [facility]



                                         (33606)

 

     



              GFR/1.73 sq M  83           (N)          90 - 120     [facility]

[facility]



                 predicted among  mL/min/{1.73_m2}   mL/min/{1.73_m2}   Center o

f [facility]



                           blacks MDRD vol           [facility]



                           rate/area                 (39309)



                                         (S/P/Bld)     

 

     



              GFR/1.73 sq  72           (N)          90 - 120     [facility]

th



                 M.predicted MDRD  mL/min/{1.73_m2}   mL/min/{1.73_m2}   Center 

of [facility] rate/area             [facility]



                                         (14638)

 

     



                 Globulin        2.9             (N)             [facility]

h



                           Calculated mass           [facility] ([facility]                  [facility]



                                         (62170)

 

     



              Glucose mass  218 mg/dL    (H)          60 - 125 mg/dL   [facility]



                                         (23329)

 

     



                 Hemoglobin      7.2             (H)             [facility]



                           A1c/Hemoglobin.t          [facility]



                           fraction (Bld)            (46615)

 

     



              Potassium molar  4.9 mmol/L   (N)          3.7 - 5.2 mmol/L   [facility]



                                         (81057)

 

     



              Protein mass  6.7 g/dL     (N)          6.4 - 8.3 g/dL   [facility]



                                         (61715)

 

     



              Sodium molar  140 mmol/L   (N)          135 - 145 mmol/L   [facility]



                                         (94733)

 

     



              Urea nitrogen  20 mg/dL     (N)          7 - 20 mg/dL   [facility]



                                         (45792)

 

     



                 Urea            NOT APPLICABLE  (no code)       [facility]



                           nitrogen/Creatin          [facility]



                                         (99754)



                                                                                
                                                                                
                                                                                
                                                                                
                                                                                
                                                                                
                                                                                
                                                                                
                                                                                
                                                                                
                                                                                
                                                                                
                                                                                
                                                                                
                                                                                
                                                                                
                                                                                
                                                                                
                                                                                
                                                                                
                                                                                
                                                                                
                                                                                
                                                                                
                                                                                
                                                                                
                                                                                
                                                                                
                                                                                
                                                                                
                                                                                
                                                                                
                                                                                
                                                                                
                                                                                
                                                                                
        



Vital Signs

                      



      



           Vital Sign  Value     Interpretation  Reference  Date Time  Care Prov

ider  

Facility



                     (Normalized)        (Normalized)        Range   

 

      



            BMI (Body Mass  39.18 kg/m2  (no code)  15 - 25 kg/m2  2019  A

MY HUNT 

[id number]                                    Community



                     Index)              15:          [facility] (44547)

 

      



            BMI (Body Mass  39.98 kg/m2  (no code)  15 - 25 kg/m2  2018  K

FRITZ RAMON

                                         Community



                 Index)          11:00      73556           [facility] (84262)

 

      



            BMI (Body Mass  40.28 kg/m2  (no code)  15 - 25 kg/m2  2018  K

FRITZ NAVARRO

                                         Community



                 Index)          11:00050      53563           [facility] (47030)

 

      



            BMI (Body Mass  40.05 kg/m2  (no code)  15 - 25 kg/m2  10-  C

KARISSA CRANE                                   Community



                 Index)          11:00      78743           [facility] (51153)

 

      



            BMI (Body Mass  40.32 kg/m2  (no code)  15 - 25 kg/m2  2018  A

MY HUNT 

89761                                    Community



                     Index)              11:00          [facility] (84130)

 

      



            BMI (Body Mass  40.2 kg/m2  (no code)  15 - 25 kg/m2  2018  

JOHNNA CRANE                                   Community



                 Index)          12:749           [facility] (62746)

 

      



            BMI (Body Mass  41.33 kg/m2  (no code)  15 - 25 kg/m2  2018  [facility]

[facility] 

WANMICHELLE                                   Community



                 Index)          17:00749           [facility] (29182)

 

      



            BMI (Body Mass  41.8 kg/m2  (no code)  15 - 25 kg/m2  2018  

JOHNNA 

SavvySystemsMICHELLE                                   Community



                 Index)          10:20749           [facility] (15283)

 

      



            BMI (Body Mass  41.43 kg/m2  (no code)  15 - 25 kg/m2  2018  A

MY HUNT 

99060                                    Community



                     Index)              14:200400          [facility] (07207)

 

      



            BMI (Body Mass  40.48 kg/m2  (no code)  15 - 25 kg/m2  2018  C

[facility] 

WANMICHELLE                                   Community



                 Index)          15:20040749           [facility] (63806)

 

      



            BMI (Body Mass  41.88 kg/m2  (no code)  15 - 25 kg/m2  2018  C

[facility] 

WANMICHELLE                                   Community



                 Index)          10:40-0      96573           [facility] (14215)

 

      



            Body height  167.64     (no code)   2019  SHIREEN Caldera

[facility]



                           15:                Hospital



                                         (19124)

 

      



           Body      97.3 [degF]  (no code)  97.8 - 99.0  2019  JAN APONTE 6

6749  

Community



                 Temperature     [degF]          15:      [facility] (69514)

 

      



           Body      96.6 [degF]  (no code)  97.8 - 99.0  2018  [facility]

[facility]



              Temperature    [degF]       11:   21405        [facility] Cent[facility]

r



                                         [facility] (29278)

 

      



           Body      97.5 [degF]  (no code)  97.8 - 99.0  2018  [facility]

[facility]



              Temperature    [degF]       11:   52827        Health Cent[facility]

r



                                         [facility] (50519)

 

      



           Body      97.7 [degF]  (no code)  97.8 - 99.0  10-  BONG [facility]



              Temperature    [degF]       11:   46929        [facility] Cent[facility]

r



                                         [facility] (02153)

 

      



           Body      97.6 [degF]  (no code)  97.8 - 99.0  2018  JAN APONTE 6

6749  

Community



                 Temperature     [degF]          11:      [facility] (37500)

 

      



           Body      97.1 [degF]  (no code)  97.8 - 99.0  2018  BONG [facility]



              Temperature    [degF]       12:   62153        Health Cent[facility]

r



                                         [facility] (49971)

 

      



           Body      97.1 [degF]  (no code)  97.8 - 99.0  2018  BONG [facility]  

Community



              Temperature    [degF]       17:   91724        Health Cent[facility]

r



                                         [facility] (72577)

 

      



           Body      98.3 [degF]  (no code)  97.8 - 99.0  2018  BONG [facility]  

Community



              Temperature    [degF]       10:   03174        Health Cent[facility]

r



                                         [facility] (80061)

 

      



           Body      98.9 [degF]  (no code)  97.8 - 99.0  2018  JAN APONTE 6

6749  

[facility]



                 Temperature     [degF]          14:200400      [facility] (74496)

 

      



           Body      97.9 [degF]  (no code)  97.8 - 99.0  2018  BONG LOCK  

[facility]



              Temperature    [degF]       15:200400   63670        [facility] (02794)

 

      



           Body      98.7 [degF]  (no code)  97.8 - 99.0  2018  no name   

[facility]



                 Temperature     [degF]          13:00040      [facility]



                                         (65075) (Work



                                         Phone:



                                         [phone]



                                         )

 

      



           Body      97.6 [degF]  (no code)  97.8 - 99.0  2018  BONG LOCK  

[facility]



              Temperature    [degF]       10:400400   44879        [facility] (71709)

 

      



            Body weight  102.5      (no code)   2019  SHIREEN SALDAÑASELENAPAVITHRA Caldera

as Heart



                           06:                Hospital



                                         (87728)

 

      



           Body weight  106.82 kg  (no code)  kg        2019  JAN APONTE 667

49  Community



                           15:0400                [facility] (74897)

 

      



           Blood Pressure  156/      (no code)  Systolic: 90 -  2018  no n

mohsen   [facility]



                 80mm[Hg]        119 mm[Hg]      13:00      [facility]



                                         

                                         (58883) (Work



                           Diastolic: 60             Phone:



                           - 79 mm[Hg]               [phone]



                                         )

 

      



           Height    165.1 cm  (no code)  cm        2019  JAN APONTE 15260  

Community



                           15:200400                [facility] (29124)

 

      



           Height    165.1 cm  (no code)  cm        2018  SAI RAMON  Co

mmunity



                     11:00749               [facility] (41014)

 

      



           Height    165.1 cm  (no code)  cm        2018  SAI RAMON  Co

mmunity



                     11:00050749               [facility] (65400)

 

      



           Height    165.1 cm  (no code)  cm        10-  BONG CRANE  

Community



                     11:000400          64479               [facility] (82362)

 

      



           Height    165.1 cm  (no code)  cm        2018  JAN APONTE 46290  

[facility]



                           11:                [facility] (57758)

 

      



           Height    165.1 cm  (no code)  cm        2018  BONG [facility]



                     12:          21071               [facility] (94759)

 

      



           Height    165.1 cm  (no code)  cm        2018  [facility]



                     17:          14906               [facility] (26879)

 

      



           Height    165.1 cm  (no code)  cm        2018  [facility]



                     10:          41238               [facility] (51916)

 

      



           Height    165.1 cm  (no code)  cm        2018  JAN APONTE 93660  

[facility]



                           14:                [facility] (71065)

 

      



           Height    165.1 cm  (no code)  cm        2018  [facility]



                     15:          31561               [facility] (70972)

 

      



           Height    163.19 cm  (no code)  cm        2018  no name   [facility]



                           13:00040                [facility]



                                         (67652) (Work



                                         Phone:



                                         [phone]



                                         )

 

      



           Height    165.1 cm  (no code)  cm        2018  [facility]



                     10:          [id number] [place] [address] (85330)

 

      



           Pulse Oximetry  92 %      (no code)  95 - 100 %  2018  JAN APONTE

 16876  

Community



                           14:                [facility] (60440)

 

      



           Weight    109 kg    (no code)  kg        2018  SAI NAVARRO  Co

mmunity



                     11:749               [facility] (68289)

 

      



           Weight    109.82 kg  (no code)  kg        2018  SAI CORDOVALES  C

ommunity



                     11:00749               [facility] (81144)

 

      



           Weight    109.18 kg  (no code)  kg        10-  [facility]



                     11:000400          [address] [place] [address] (33187)

 

      



           Weight    109.91 kg  (no code)  kg        2018  JAN APONTE 15587 

 Community



                           11:                [facility] (40631)

 

      



           Weight    109.59 kg  (no code)  kg        2018  BONG CRANE 

 [facility]



                     12:          66320               [facility] (01814)

 

      



           Weight    112.67 kg  (no code)  kg        2018  BONG CRANE 

 [facility]



                     17:          05187               [facility] (56857)

 

      



           Weight    113.94 kg  (no code)  kg        2018  BONG CRANE 

 [facility]



                     10:          35466               [facility] (32077)

 

      



           Weight    112.95 kg  (no code)  kg        2018  JNA APONTE 56247 

 Community



                           14:                [facility] (49877)

 

      



           Weight    110.36 kg  (no code)  kg        2018  BONG [facility]



                     15:          33896               [facility] (39776)

 

      



           Weight    111.13 kg  (no code)  kg        2018  no name   [facility]



                           13:00040                [facility]



                                         (26147) (Work



                                         Phone:



                                         [phone]



                                         )

 

      



           Weight    114.17 kg  (no code)  kg        2018  BONG [facility]



                     10:          [id number] [place] [address] (12648)



                                                                                
                                                                                
                                                                                
                                                                                
                                                                                
                                                                                
                                                                                
                            



Interventions

          No Information                                                        
  



Plan of Treatment

                      



    



              Normalized Care  Care Detail  Care Activity Date  Care Provider  F

acility



                                         Activity    

 

    



              ([facility]) Chronic Health  [facility]  [place]  10- -  BONG MARKS [address]357  

[facility]         10- -        [facility]



                           10-                [place] ([address]10)

 

    



              [facility]  [place]  2018   SAI NAVARRO 

50614  

[facility] (95676)

 

    



              no information  no information  no information  BONG CRANE 667

49  [facility] (82993)



                                                                                
                  



Goals

          No Information                                                        
  



Social History

          No Information                                                        
  



Functional Status

                      The data below is from unstructured sourcesNo functional 
status results.No functional status results.No functional status results.       
                                                             



Mental Status

          No Information                                                        
  



Encounters

                      



    



              Encounter    Normalized Encounter  Encounter Diagnosis  Care Provi

claudia  

Organization



                           Date                      Type   

 

    



              2018   ([facility]) Chronic Health  Cervicalgia  BONG CRANE (no

  [facility]  

[place] (no



                 -               Maintenance     phone)          phone)



                                         2018    



                                         -    



                                         09-    

 

    



              10-   Emergency department  no information  no name (no robbi

ne)  no 

organization name



                           patient visit             (no phone)

 

    



              2019   Evaluation and  no information  no name (no phone)  n

o organization

 name



                     -                   management of       (no phone)



                           2019                inpatient   

 

    



                 Evaluation and  no information  no name (no phone)  no organiza

tion name



                           management of             (no phone)



                                         inpatient   

 

    



              05-   Office/outpatient  no information  Tanisha Thao MD [facility] Work



                     visit, [facility] level 3   Phone: [phone]  Phone: [phone] 

 

    



              2018   Patient encounter  no information  no name (no phone)

  no 

organization name



                                         (no phone)

 

    



              2018   Patient encounter  no information  no name (no phone)

  no 

organization name



                                         (no phone)

 

    



              2018   Patient encounter  no information  no name (no phone)

  no 

organization name



                                         (no phone)

 

    



              2018   Patient encounter  no information  no name (no phone)

  no 

organization name



                                         (no phone)

 

    



              NEGATED      Patient encounter  no information  no name (no phone)

  no organization

 name



                           2018                (no phone)



                                         -    



                                         2018   Patient encounter  no information  no name (no phone)

  no 

organization name



                                         (no phone)

 

    



              2018   Patient encounter  no information  no name (no phone)

  no 

organization name



                                         (no phone)

 

    



              NEGATED      Patient encounter  no information  no name (no phone)

  no organization

 name



                           04-                (no phone)

 

    



              2018   Patient encounter  no information  no name (no phone)

  no 

organization name



                                         (no phone)

 

    



              2018   Patient encounter  no information  no name (no phone)

  no 

organization name



                                         (no phone)

 

    



                 Patient encounter  no information  no name (no phone)  no organ

ization name



                                         (no phone)

 

    



              2020   Patient encounter  no information  [facility]



                     procedure           phone)              [facility] (no phone)

 

    



              2020   Patient encounter  no information  [facility]

[facility]



                     procedure           phone)              [facility] (no phone)

 

    



              2020   Patient encounter  no information  (no phone)   [facility]



                           procedure                 [facility] (no phone)

 

    



              2020   Patient encounter  no information  (no phone)   [facility]



                           procedure                 [facility] (no phone)

 

    



              2020   Patient encounter  no information  no name (no phone)

  no 

organization name



                           procedure                 (no phone)

 

    



              2020   Patient encounter  no information  Kane Cruz (no 

 [facility]



                 -               procedure       phone)          [facility]

l



                           2020                [facility] (no phone)

 

    



              2020   Patient encounter  no information  no name (no phone)

  no 

organization name



                           procedure                 (no phone)

 

    



              2020   Patient encounter  no information  no name (no phone)

  no 

organization name



                           procedure                 (no phone)

 

    



              2020   Patient encounter  no information  no name (no phone)

  no 

organization name



                           procedure                 (no phone)

 

    



              2020   Patient encounter  no information  (no phone)   [facility] (no phone)

 

    



              2019   Patient encounter  no information  no name (no phone)

  no 

organization name



                           procedure                 (no phone)

 

    



              2019   Patient encounter  no information  no name (no phone)

  no 

organization name



                           procedure                 (no phone)

 

    



              10-   Patient encounter  no information  no name (no phone)

  no 

organization name



                     -                   procedure           (no phone)



                                         10-    

 

    



              10-   Patient encounter  no information  no name (no phone)

  no 

organization name



                           procedure                 (no phone)

 

    



              10-   Patient encounter  no information  no name (no phone)

  no 

organization name



                     -                   procedure           (no phone)



                                         10-    

 

    



              10-   Patient encounter  no information  no name (no phone)

  no 

organization name



                           procedure                 (no phone)

 

    



              10-   Patient encounter  no information  no name (no phone)

  no 

organization name



                           procedure                 (no phone)

 

    



              2019   Patient encounter  no information  no name (no phone)

  no 

organization name



                           procedure                 (no phone)

 

    



              2019   Patient encounter  no information  no name (no phone)

  no 

organization name



                           procedure                 (no phone)

 

    



              2019   Patient encounter  no information  no name (no phone)

  no 

organization name



                           procedure                 (no phone)

 

    



              2019   Patient encounter  no information  no name (no phone)

  no 

organization name



                           procedure                 (no phone)

 

    



              2019   Patient encounter  no information  no name (no phone)

  no 

organization name



                           procedure                 (no phone)

 

    



              2019   Patient encounter  no information  no name (no phone)

  no 

organization name



                           procedure                 (no phone)

 

    



              2019   Patient encounter  no information  no name (no phone)

  no 

organization name



                           procedure                 (no phone)

 

    



              2019   Patient encounter  no information  no name (no phone)

  no 

organization name



                           procedure                 (no phone)

 

    



              2019   Patient encounter  no information  no name (no phone)

  no 

organization name



                           procedure                 (no phone)

 

    



              2019   Patient encounter  no information  no name (no phone)

  no 

organization name



                           procedure                 (no phone)

 

    



              2019   Patient encounter  no information  no name (no phone)

  no 

organization name



                           procedure                 (no phone)

 

    



              2019   Patient encounter  no information  no name (no phone)

  no 

organization name



                           procedure                 (no phone)

 

    



              2019   Patient encounter  no information  no name (no phone)

  no 

organization name



                           procedure                 (no phone)

 

    



              2019   Patient encounter  no information  no name (no phone)

  no 

organization name



                           procedure                 (no phone)

 

    



              2019   Patient encounter  no information  no name (no phone)

  no 

organization name



                           procedure                 (no phone)

 

    



              2019   Patient encounter  no information  no name (no phone)

  no 

organization name



                           procedure                 (no phone)

 

    



              2019   Patient encounter  no information  no name (no phone)

  no 

organization name



                     -                   procedure           (no phone)



                                         2019   Patient encounter  no information  no name (no phone)

  no 

organization name



                           procedure                 (no phone)

 

    



              2019   Patient encounter  no information  no name (no phone)

  no 

organization name



                           procedure                 (no phone)

 

    



              2019   Patient encounter  no information  no name (no phone)

  no 

organization name



                           procedure                 (no phone)

 

    



              2019   Patient encounter  no information  no name (no phone)

  no 

organization name



                           procedure                 (no phone)

 

    



              2018   Patient encounter  no information  no name (no phone)

  no 

organization name



                           procedure                 (no phone)

 

    



              2018   Patient encounter  no information  no name (no phone)

  no 

organization name



                           procedure                 (no phone)

 

    



              2018   Patient encounter  no information  no name (no phone)

  no 

organization name



                           procedure                 (no phone)

 

    



              10-   Patient encounter  no information  no name (no phone)

  no 

organization name



                           procedure                 (no phone)

 

    



              10-   Patient encounter  no information  no name (no phone)

  no 

organization name



                     -                   procedure           (no phone)



                                         10-    

 

    



              11-   Telephone encounter  Other cervical disc  DOROTEO DOWNS

 (no phone)  

[facility]  [place] (no



                 -               degeneration,   [facility] (no  phone)



                     2018          unspecified cervical  phone) DOROTEO CHAUDHARI (no 



                     -                   region              phone) [facility] B[facility]Y

ER 



                           2018                (no phone) 

 

    



              2018   Telephone encounter  Viral intestinal  [facility]

 (no  [facility] 

 [place] (no



                 -               infection, unspecified  phone)          phone)



                                         2018    



                                         -    



                                         2018   Telephone encounter  no information  [facility] (

no  [facility] 

[address] [place] (no



                     -                   phone)              phone)



                                         2018    



                                         -    



                                         2018   Telephone encounter  Type 2 diabetes  [facility] 

(no  [facility] 

 [place] (no



                 -               mellitus without  phone)          phone)



                           2018                complications  



                                         -    



                                         2018    

 

    



                 no information  Encounter for general  no name (no phone)  no o

rganization 

name



                           adult medical             (no phone)



                                         examination without  



                                         abnormal findings  

 

    



                 no information  Encounter for dental  no name (no phone)  no or

ganization name



                           examination and           (no phone)



                                         cleaning without  



                                         abnormal findings  



                                                                                
                                                                                
                                                                                
                                                                                
                                                                                
                                                                                
                                                                                
                                                                                
                                                                              



Medical Equipment

          No Information                                                        
  



Payers

                      



 



                           Normalized Payer          Value

 

 



                           Medicaid                  no information

 

 



                           Medicaid                  [id number]

 

 



                           Medicare                  no information

 

 



                           Medicare                  [id number]

 

 



                           Medicare                  [id number]



                                                                                
                                      



History general Narrative - Reported

                      



  



                     Note Type           Note                Facility

 

 



                                         History 



                                         general 



                                         Narrative 



                                         - Reported 

 

  



                                         Type

 

  



                           Medical                   Other symptoms involving sk

in and integumentary tissues 



                                         History  

 

  



                           Medical                   Impetigo 



                                         History  

 

  



                           Medical                   Schizoaffective disorder, u

nspecified 



                                         History  

 

  



                           Medical                   Essential hypertension, serenity

ign 



                                         History  

 

  



                           Medical                   Diabetes mellitus without m

ention of complication, type II or 

unspecified type, 



                           History                   not stated as uncontrolled 

 

  



                           Medical                   Urinary frequency 



                                         History  

 

  



                           Medical                   Encounter for long-term (cu

rrent) use of other medications 



                                         History  

 

  



                           Medical                   Esophageal reflux 



                                         History  

 

  



                           Medical                   DIABETES TYPE II 



                                         History  

 

  



                           Medical                   HIGH BLOOD PRESSURE- pt sta

jeffery sometimes it goes low 



                                         History  

 

  



                           Medical                   BACK TROUBLE 



                                         History  

 

  



                           Surgical                  cholecystectomy 



                                         History  

 

  



                     Surgical            heart cath          2019



                                         History  

 

  



                           [facility]ospital[facility]                Surgery(s) only 



                                         ation  



                                         History  

 

  



                           [facility]ospital[facility]                possible pneumonia or fluid

 on around heart-sent to [facility] 

heart/thinking COPD                      2019



                                         ation  



                                         History  



                                                        [facility] ([id number])                                                     
                                                                   



Summary Purpose

          eClinicalWorks SubmissioneClinicalWorks SubmissioneClinicalWorks 
SubmissioneClinicalWorks SubmissioneClinicalWorks SubmissioneClinicalWorks 
SubmissioneClinicalWorks SubmissioneClinicalWorks Submission                    
                                      



Advance Directives

                      



                    Directive                   Response                   Recor

ded Date/Time       

         

 

                    Advance Directives                   No                    1:18pm       

         

 

                    Health Care Power of                    No          

         16 

1:18pm                

 

                    Resuscitation Status                   Full Code            

       16 

1:18pm                



            



                    Directive                   Response                   Recor

ded Date/Time       

         

 

                    Advance Directives                   No                   10

/04/16 8:58am       

         

 

                    Health Care Power of                    No          

         10/04/16 

8:58am                

 

                    Organ Donor                   No                   10/04/16 

8:58am              

  

 

                    Resuscitation Status                   Full Code            

       10/04/16 

8:58am                



            



                          Directive Description                   Start Date    

            

 

                          PERMISSION TO SHARE                         

          



                                                                    



Discharge Instructions

          No hospital discharge instructions.                          

Patient Instructions              

Physician Instructions              

              

Plan of Care/Instructions/FU:               

repeat colonoscopy 10 years unless family history of colon cancer then 5        
      

years. any problems prior to that be re-evaluated at that time.              

Activity as Tolerated: Yes              

Discharge Diet: Regular Diet              

              

              

Care Plan              

Patient Instructions:: repeat colonoscopy 10 years unless family history of     
          

colon cancer then 5years. any problems prior to that be re-evaluated at that    
           

time.              

                                                                      



Additional Source Comments

          This clinical document has been generated using [employer] 
software that has been certified by the Office of the National Coordinator for 
Health Information Technology (ONC 15.99.04.3023.Diam.31.00.0.797848) and the 
National Committee for  (NCQA, as an eMeasure certified 
technology).            

            

FOR RECORDS PERTAINING TO PATIENTS WHO ARE OR HAVE BEEN ENROLLED IN A CHEMICAL D
EPENDENCY/SUBSTANCE ABUSE PROGRAM, SOME INFORMATION MAY BE OMITTED. This clinica
l summary was aggregated from multiple sources.  Caution should be exercised in 
using it in the provision of clinical care. This summary normalizes information 
from multiple sources, and as a consequence, information in this document may ma
terially change the coding, format and clinical context of patient data. In yanira
tion, data may be omitted in some cases. CLINICAL DECISIONS SHOULD BE BASED ON T
HE PRIMARY CLINICAL RECORDS. [employer] provides no warranty or guara
ntee of the accuracy or completeness of information in this document.The followi
ng information is based on time limited clinical information            



                                        UNRECOGNIZED CONTENT PROVIDED BELOW FOR 

UNRECOGNIZED SECTION MEDICAL (GENERAL) 

HISTORY                

 

                                                         



            



                    Type                   Description                   Date   

             

 

                          Medical History                   Other symptoms invol

ving skin and 

integumentary tissues                                    

 

                    Medical History                   Impetigo                  

                  

 

                          Medical History                   Schizoaffective diso

rder, unspecified         

                                                         

 

                          Medical History                   Essential hypertensi

on, benign                

                                                         

 

                          Medical History                   Diabetes mellitus wi

thout mention of 

complication, type II or unspecified type, not stated as uncontrolled           
                                                         

 

                    Medical History                   Urinary frequency         

                    

       

 

                          Medical History                   Encounter for long-t

erm (current) use of other

 medications                                             

 

                    Medical History                   Esophageal reflux         

                    

       

 

                    Medical History                   DIABETES TYPE II          

                    

      

 

                          Medical History                   HIGH BLOOD PRESSURE-

 pt states sometimes it 

goes low                                                 

 

                    Medical History                   BACK TROUBLE              

                    

  

 

                    Surgical History                   cholecystectomy          

                    

      

 

                    Hospitalization History                   Surgery(s) only   

                    

             



            



                    Type                   Description                   Date   

             

 

                          Medical History                   Other symptoms invol

ving skin and 

integumentary tissues                                    

 

                    Medical History                   Impetigo                  

                  

 

                          Medical History                   Schizoaffective diso

rder, unspecified         

                                                         

 

                          Medical History                   Essential hypertensi

on, benign                

                                                         

 

                          Medical History                   Diabetes mellitus wi

thout mention of 

complication, type II or unspecified type, not stated as uncontrolled           
                                                         

 

                    Medical History                   Urinary frequency         

                    

       

 

                          Medical History                   Encounter for long-t

erm (current) use of other

 medications                                             

 

                    Medical History                   Esophageal reflux         

                    

       

 

                    Medical History                   DIABETES TYPE II          

                    

      

 

                          Medical History                   HIGH BLOOD PRESSURE-

 pt states sometimes it 

goes low                                                 

 

                    Medical History                   BACK TROUBLE              

                    

  

 

                    Surgical History                   cholecystectomy          

                    

      

 

                    Surgical History                   heart cath               

    2019         

       

 

                    Hospitalization History                   Surgery(s) only   

                    

             

 

                          Hospitalization History                   possible pne

umonia or fluid on around 

heart-sent to [facility] heart/thinking COPD                   2019                



            



                                        UNRECOGNIZED CONTENT PROVIDED BELOW FOR 

UNRECOGNIZED SECTION REASON FOR VISIT   

             

 

                                                         



Shaking check up. KMillerLPNBiopsy f/u, urinary incont. JBishop3 month f/u, Diab
etic f/u Cherry Sosa RNVomiting, started 2 day's ago, she is able to keep some 
stuff down, ClsmithxrayMedication refill requestspots on chest and face that 
itch, pharm at [facility] told her it might be her pain pills causing her to itch 
so she stoped taking them, had the spots since last week JBishopMedication 
questionPain management (chronic) - back, ClsmithRequests Return CallMedication 
refill requestneck, arm, chest painDiabetes, A1C = 6.6 , Clsmithrefill 
metforminControlled/refill requestRefill requestFYI onlyMedication 
questionrefill medicationSore in nose since last week. has not treated because 
she didn't know if she should put anything inside her nose. YudirLPNrefkameron 
lipitorsore on nose f/u said its been healing [id number] 
effect?diabetic visit Elizabeth

## 2020-04-10 NOTE — XMS REPORT
[facility]

                             Created on: 2020



Radha Hardin

External Reference #: [id number]

: 1954

Sex: Female



Demographics





                          Address                   [address] [place]  [address]

 

                          Preferred Language        Unknown

 

                          Marital Status            Unknown

 

                          Adventist Affiliation     Unknown

 

                          Race                      Unknown

 

                          Ethnic Group              Unknown





Author





                          Author                    Radha DOWNS

 

                          Organization              [facility]  [place]

 

                          Address                   [address] [place]  [address]



 

                          Phone                     [phone]







Care Team Providers





                    Care Team Member Name Role                Phone

 

                    DOROTEO DOWNS       Unavailable         [phone]







PROBLEMS





          Type      Condition ICD9-CM Code EGJ81-CM Code Onset Dates Condition S

tatus SNOMED 

Code

 

          Problem   Essential (primary) hypertension           I10              

   Active    93581009

 

          Problem   Gastro-esophageal reflux disease without esophagitis        

   K21.9               Active    

404716488

 

                          Problem                   Type 2 diabetes mellitus wit

h diabetic polyneuropathy, without long-term

current use of insulin              E11.42                    Active       74602

006

 

          Problem   Lumbar degenerative disc disease           M51.36           

   Active    66946338

 

          Problem   Other schizoaffective disorders           F25.8             

  Active    61182341

 

                Problem         Migraine without aura and without status migrain

osus, not intractable                 

G43.009                                 Active              565361738

 

          Problem   Degenerative disc disease, cervical           M50.30        

      Active    31920112

 

          Problem   Thyroid nodule           E04.1               Active    57840

5005

 

          Problem   Left lower quadrant abdominal pain           R10.32         

     Active    726609529

 

          Problem   Mixed incontinence           N39.46              Active    4

80158461

 

          Problem   Urge incontinence           N39.41              Active    87

999387

 

          Problem   COPD mixed type           J44.9               Active    1364

5005

 

           Problem    Moderate episode of recurrent major depressive disorder   

         F33.1                 Active

                                        [id number]

 

          Problem   Gastric reflux           K21.9               Active    30561

7003

 

          Problem   Rotator cuff tear           M75.100             Active    41

25590

 

                Problem         Incomplete tear of right rotator cuff, unspecifi

ed whether traumatic                 

M75.111                                 Active              032711914

 

          Problem   Chronic kidney disease, stage 3           N18.3             

  Active    304201264

 

          Problem   COPD exacerbation           J44.1               Active    19

2999091







ALLERGIES

No Information



ENCOUNTERS





                Encounter       Location        Date            Diagnosis

 

                [facility]  [place]  [id number] [place] [address] 24

 Mar, 2020     

 

                [facility]  [place]  [id number] [place] [address] 13

 Mar, 2020    COPD 

mixed type J44.9 and Type 2 diabetes mellitus with diabetic polyneuropathy, 
without long-term current use of insulin E11.42

 

                [facility]  [place]  [facility] [place] [address] 10

 Mar, 2020     

 

                [facility]  [place]  [id number] [place] [address] 06

 Mar, 2020     

 

                [facility]  [place]  [facility] [address] 04

 Mar, 2020    Other

schizoaffective disorders F25.8 and Type 2 diabetes mellitus with diabetic 
polyneuropathy, without long-term current use of insulin E11.42

 

                    [facility] [place] [address] [facility] [place] [address] 03 

Mar, 2020                                

 

                [facility]  [place]  [facility] [address] 02

 Mar, 2020     

 

                [facility]  [place]  [facility] [address] 28

 2020    Type 

2 diabetes mellitus with diabetic polyneuropathy, without long-term current use 
of insulin E11.42 ; Moderate episode of recurrent major depressive disorder 
F33.1 and Other schizoaffective disorders F25.8

 

                [facility]  [place]  [facility] [address]      

 

                [facility]  [place]  [place] [id number] [address]      

 

                [facility]  [place]  [id number] [address] 24

 2020     

 

                [facility]  [place]  [facility] [address] 21

 2020    

Incomplete tear of right rotator cuff, unspecified whether traumatic M75.111

 

                [facility]  [place]  [facility] [place] [address] 20

 2020     

 

                [facility]  [place]  [facility] [address] 19

 2020     

 

                [facility]  [place]  [facility] [place] [address] 18

 2020    Other

schizoaffective disorders F25.8

 

                [facility]  [place]  [id number] [place] [address] 13

 Feb,      

 

                [facility]  [place]  [id number] [place] [address] 12

 Feb,      

 

                [facility]  [place]  [id number] [place] [address] 11

 Feb,      

 

                [facility]  [place]  [id number] [place] [address] 10

 Feb,      

 

                [facility]  [place]  [id number] [place] [address] 07

 Feb,      

 

                [facility]  [place]  [id number] [place] [address] 05

 Feb,      

 

                [facility]  [place]  [id number] [place] [address] 04

 Feb,      

 

                [facility]  [place]  [id number] [place] [address] 03

 Feb,      

 

                [facility]  [place]  [id number] [place] [address] ,      

 

                [facility]  [place]  [facility] [place] [address]     

Incomplete tear of right rotator cuff, unspecified whether traumatic M75.111

 

                [facility]  [place]  [facility] [id number] [place] [address]      

 

                [facility]  [place]  [facility] [place] [address] 30

 2020    

Rotator cuff tear M75.100 ; Type 2 diabetes mellitus with diabetic 
polyneuropathy, without long-term current use of insulin E11.42 ; High risk 
medication use Z79.899 and Urge incontinence N39.41

 

                [facility]  [place]  [id number] [place] [address]     

Dysuria R30.0

 

                [facility]  [place]  [facility] [id number] [address]      

 

                [facility]  [place]  [facility] [place] [id number] [place] [address]      

 

                [facility]  [place]  [facility] [place] [id number] [place] [address]      

 

                [facility]  [place]  [facility] [place] [id number] [place] [address]      

 

                [facility]  [place]  [facility] [place] [id number] [place] [address]     

Lumbago M54.5

 

                [facility]  [place]  [facility] [address] [id number] [place] [address]      

 

                [facility]  [place]  [facility] [address] [id number] [place] [address] 10

 Yung, 2020     

 

                [facility]  [place]  [facility] [address] [id number] [place] [address] 08

 2020     

 

                [facility]  [place]  [facility] [place] [id number] [place] [address] 07

 2020     

 

                [facility]  [place]  [facility] [place] [id number] [address] 07

 2020     

 

                [facility]  [place]  [facility] [place] [address] 02

 2020    

Incomplete tear of right rotator cuff, unspecified whether traumatic M75.111 ; 
Essential (primary) hypertension I10 and Thyroid nodule E04.1

 

                    [facility] [address] [facility] [id number] [place] [address]                                 

 

                [facility]  [place]  [facility] [id number] [place] [address] 31

 Dec, 2019     

 

                [facility]  [place]  [id number] [place] [address] 30

 Dec, 2019    COPD 

exacerbation J44.1

 

                [facility]  [place]  [facility] [id number] [place] [address] 27

 Dec, 2019     

 

                [facility]  [place] [address] [place] [address] ST [id number] [place] [address] 26

 Dec, 2019     

 

                [facility]  [place] [address] [place] [address] [place] [id number] [place] [address] 20

 Dec, 2019    

Nausea R11.0 ; Incomplete tear of right rotator cuff, unspecified whether 
traumatic M75.111 and Essential (primary) hypertension I10

 

                [facility]  [place] [address] [place] [address] ST [id number] [place] [address] 20

 Dec, 2019     

 

                [facility]  [place] [address] [place] [address] [place] [id number] [place] [address] 18

 Dec, 2019     

 

                [facility]  [place] [address] [place] [address] [place] [id number] [place] [address] 16

 Dec, 2019     

 

                [facility]  [place] [address] [place] [address] [place] [id number] [place] [address] 11

 Dec, 2019    

Rotator cuff tear M75.100

 

                [facility]  [place] [address] [place] [address] [place] [id number] [place] [address] 09

 Dec, 2019     

 

                [facility]  [place] [address] [place] [id number] [address] 05

 Dec, 2019    

Essential (primary) hypertension I10 ; Type 2 diabetes mellitus with diabetic 
polyneuropathy, without long-term current use of insulin E11.42 ; Rotator cuff 
tear M75.100 and Chronic kidney disease, stage 3 N18.3

 

                [facility]  [place] [address] [place] [address] [place] [id number] [place] [address] 03

 Dec, 2019     

 

                [facility]  [place] [address] [place] [address] [place] [id number] [place] [address] 02

 Dec, 2019     

 

                [facility]  [place] [address] [place] [address] [place] [id number] [place] [address] 02

 Dec, 2019     

 

                [facility]  [place] [address] [place] [address] [place] [id number] [place] [address]      

 

                [facility]  [place] [address] [place] [address] [place] [id number] [place] [address]      

 

                [facility]  [place] [address] [place] [id number] [address]      

 

                [facility]  [place] [address] [place] [id number] [address]     

Rotator cuff tear M75.100

 

                [facility]  [place] [address] [id number] [address]      

 

                [facility]  [place] [address] [place] [id number] [address]     

Rotator cuff tear M75.100 ; Major depressive disorder with current active 
episode, unspecified depression episode severity, unspecified whether recurrent 
F32.9 ; Type 2 diabetes mellitus with diabetic polyneuropathy, without long-term
current use of insulin E11.42 and Essential (primary) hypertension I10

 

                [facility]  [place] [address] [place] [id number] [address]      

 

                [facility]  [place] [address] [place] [id number] [address]      

 

                [facility]  [place] [address] [place] [id number] [address] 31

 Oct, 2019     

 

                [facility]  [place] [address] [id number] [address] 29

 Oct, 2019     

 

                [facility]  [place] [address] [id number] [address] 21

 Oct, 2019    

Rotator cuff tear M75.100 ; Essential (primary) hypertension I10 and COPD mixed 
type J44.9

 

                [facility]  [place] [address] [place] [address] [place] [id number] [place] [address] 18

 Oct, 2019    

Rotator cuff tear M75.100

 

                [facility]  [place] [address] [place] [address] [place] [id number] [place] [address] 15

 Oct, 2019     

 

                [facility]  [place] [address] [place] [id number] [address] 11

 Oct, 2019    

Preprocedural examination Z01.818

 

                [facility]  [place] [address] [place] [id number] [address] 09

 Oct, 2019     

 

                [facility]  [place] [address] [id number] [address] 09

 Oct, 2019     

 

                [facility]  [place] [address] [place] [id number] [address] 08

 Oct, 2019    

Traumatic complete tear of right rotator cuff, initial encounter S46.011A ; 
Essential (primary) hypertension I10 ; Encounter for immunization Z23 ; Leg 
cramps R25.2 ; COPD mixed type J44.9 and Fatigue R53.83

 

                [facility]  [place] [address] [place] [id number] [address] 24

 Sep, 2019     

 

                [facility]  [place] [address] [id number] [address] 18

 Sep, 2019     

 

                [facility]  [place] [address] [id number] [address] 16

 Sep, 2019     

 

                [facility]  [place] [address] [place] [id number] [address] 13

 Sep, 2019     

 

                [facility]  [place] [address] [place] [id number] [address] 12

 Sep, 2019    

Traumatic complete tear of right rotator cuff, initial encounter S46.011A ; Left
lower quadrant abdominal pain R10.32 ; Essential (primary) hypertension I10 ; 
COPD mixed type J44.9 and Long term use of drug Z79.899

 

                [facility]  [place] [address] [place] [address] 10

 Sep, 2019     

 

                [facility]  [place] [address] [place] [address] [id number] [place] [address] 09

 Sep, 2019    

Gastroenteritis and colitis, viral A08.4

 

                [facility]  [place] [address] [place] [address] [place] [id number] [place] [address] 04

 Sep, 2019     

 

                [facility]  [place] [address] [id number] [address] 30

 Aug, 2019    

Traumatic complete tear of right rotator cuff, initial encounter S46.011A ; 
Chronic obstructive pulmonary disease, unspecified COPD type J44.9 and Mixed 
incontinence N39.46

 

                [facility]  [place] [address] [place] [address] [place] [id number] [place] [address] 29

 Aug, 2019     

 

                [facility]  [place] [address] [place] [id number] [address] 27

 Aug, 2019     

 

                [facility]  [place] [address] [id number] [address] 24

 Aug, 2019    

Injury of right shoulder, initial encounter S49.91XA

 

                [facility]  [place] [address] [id number] [address] 05

 Aug, 2019     

 

                [facility]  [place] [address] [id number] [address] 03

 Aug, 2019     

 

                [facility]  [place] [address] [id number] [address]      

 

                [facility]  [place] [address] [id number] [address]      

 

                [facility]  [place] [address] [id number] [address]     

Essential (primary) hypertension I10 ; Type 2 diabetes mellitus with diabetic 
polyneuropathy, without long-term current use of insulin E11.42 ; COPD mixed 
type J44.9 ; Thyroid nodule E04.1 and Degenerative disc disease, cervical M50.30

 

                [facility]  [place] [address] [id number] [address]      

 

                [facility]  [place] [address] [id number] [address]      

 

                [facility]  [place] [address] [id number] [address]      

 

                [facility]  [place] [address] [id number] [address]     

Gastroenteritis and colitis, viral A08.4

 

                [facility]  [place] [address] [id number] [address]     

Chronic obstructive pulmonary disease, unspecified COPD type J44.9

 

                [facility]  [place] [address] [place] [address] 18

 2019    

Gastric reflux K21.9

 

                [facility]  [place] [address] [place] [address] 18

 2019     

 

                [facility] [address] [place] [id number] [address] 14

 2019    

Essential (primary) hypertension I10

 

                [facility]  [place] [address] [place], [place] [id number] [place] [address] 13

 2019     

 

                [facility]  [place] [address] [place], [place] [id number] [place] [address] 13

 2019    

Chronic obstructive pulmonary disease, unspecified COPD type J44.9 ; Type 2 
diabetes mellitus with diabetic polyneuropathy, without long-term current use of
insulin E11.42 ; Essential (primary) hypertension I10 ; Gastro-esophageal reflux
disease without esophagitis K21.9 and Varicella vaccination Z23

 

                [facility]  [place] [address] [place] [address] [address] 05

 2019    

Dyspnea on exertion R06.09 and Infiltrate noted on imaging study R93.89

 

                [facility] [address] [place] [address] [address] 28

 May, 2019    

Thyroid pain E07.89

 

                [facility] [address] [place] [address] [address] 25

 May, 2019    

Thyroid pain E07.89

 

                [facility] [address] [place] [address] 03

 May, 2019    Other

schizoaffective disorders F25.8

 

                [facility]  [place] [address] [place] [address]     Type 

2 diabetes mellitus with diabetic polyneuropathy, without long-term current use 
of insulin E11.42 ; Gastric reflux K21.9 and Diabetic retinopathy of right eye 
associated with type 2 diabetes mellitus, macular edema presence unspecified, 
unspecified retinopathy severity E11.319

 

                [facility]  [place] [address] [place] [address] [place] [address] 20

 Mar, 2019     

 

                [facility]  [place] [address] [place] [address] 19

 Mar, 2019    Type 

2 diabetes mellitus with diabetic polyneuropathy, without long-term current use 
of insulin E11.42 and Gastric reflux K21.9

 

                [facility]  [place] [address] [place] [address] [address] 18

 Mar, 2019     

 

                [facility]  [place] [address] [place], [place] [address] [place] [address] 12

 Mar, 2019    Acute

vaginitis N76.0 and Other schizoaffective disorders F25.8

 

                    [facility] [address] [facility] [place] [address] 08 

Mar, 2019                                

 

                [facility] [address] [place], [place] [id number] [place] [address] 01

 Mar, 2019     

 

                [facility] [address] [place], [place] [id number] [place] [address]      

 

                [facility]  [place] [address] [place] [id number] [place] [address]      

 

                [facility]  [place] [address] [place] [address] [place] [address]      

 

                [facility]  [address] [place] [address]     

Dental examination Z01.20 and Caries K02.9

 

                [facility]  [place] [address] [place] [address] [address]     Type 

2 diabetes mellitus without complications E11.9

 

                [facility]  [place] [address] [place] [id number] [address]      

 

                [facility]  [place] [address] [id number] [address]     

Migraine without aura and without status migrainosus, not intractable G43.009

 

                [facility]  [place] [address] [place] [address]      

 

                [facility]  [place]  [facility] [place] [id number] [address]      

 

                [facility]  [place]  [facility] [place] [id number] [place] [address]      

 

                [facility] [facility]  [place] [address] [place], [place] [id number] [address] 09

 2019    

Shortness of breath R06.02

 

                [facility]  [place] [address] [place], [place] [id number] [address] 07

 2019    

Dental examination Z01.20

 

                [facility]  [place] [address] [place] [address] [id number] [address] 05

 2019    Acute

intractable headache, unspecified headache type R51 and BMI 40.0-44.9, adult 
Z68.41

 

                [facility]  [place]  [id number] [place] [address]      

 

                [facility]  [place]  [id number] [address]      

 

                [facility]  [place]  [facility]757[place] [address]      

 

                [facility]  [place] [address] [place] [id number] [address] 31

 Dec, 2018     

 

                [facility]  [place] [address] [place] [address] [place] [id number] [place] [address] 20

 Dec, 2018     

 

                [facility]  [place] [address] [place] [address] [place] [id number] [address] 20

 Dec, 2018     

 

                [facility]  [place] [address] [place] [address] [place] [id number] [address] 18

 Dec, 2018    

Impetigo L01.00 and Sore in nose J34.89

 

                [facility]  [place]  [facility] [id number] [address] 17

 Dec, 2018    

Degenerative disc disease, cervical M50.30

 

                [facility]  [place]  [facility] [id number] [address] 11

 Dec, 2018    Sore 

in nose J34.89

 

                [facility]  [place] [address] [place] [id number] [address] 11

 Dec, 2018    Sore 

in nose J34.89 ; Excoriation of abdomen, initial encounter S30.811A ; Encounter 
for immunization Z23 and BMI 40.0-44.9, adult Z68.41

 

                [facility]  [place] [place] [address]      

 

                [facility]  [place] [address] [place] [id number] [address]      

 

                [facility]  [place] [address] [place], [place] [id number] [address]     Type 

2 diabetes mellitus without complications E11.9

 

                [facility]  [place] [place] [address]      

 

                [facility]  [place] [address] [place], [place] [id number] [address]     

Degenerative disc disease, cervical M50.30

 

                [facility]  [place] [address] [place], [place] [id number] [address]     

Cervicalgia M54.2

 

                [facility]  [place] [place] [address]      

 

                [facility]  [place] [address] [place] [id number] [address]      

 

                [facility]  [place] [address] [place], [place] [id number] [address]      

 

                [facility]  [place] [address] [place], [place] [id number] [address]     

Gastroenteritis and colitis, viral A08.4

 

                [facility]  [place] [address] [place], [place] [id number] [address] 22

 Oct, 2018    Type 

2 diabetes mellitus without complications E11.9 ; Degenerative disc disease, 
cervical M50.30 ; Essential (primary) hypertension I10 ; Gastro-esophageal 
reflux disease without esophagitis K21.9 and BMI 40.0-44.9, adult Z68.41

 

                [facility] [address] [place] [id number] [address] 26

 Sep, 2018    

Degenerative disc disease, cervical M50.30 ; Type 2 diabetes mellitus without 
complications E11.9 ; Encounter for immunization Z23 ; Long term current use of 
insulin Z79.4 and BMI 40.0-44.9, adult Z68.41

 

                [facility] [address] [place] [address] 20

 Sep, 2018    

Degenerative cervical disc M50.30

 

                [facility] [address] [place] [address] 19

 Sep, 2018     

 

                [facility] [address] [place] [address] 18

 Sep, 2018    

Generalized pruritus L29.9 ; Lumbar degenerative disc disease M51.36 and BMI 
40.0-44.9, adult Z68.41

 

                [facility] [address] [id number] [address] 06

 Sep, 2018     

 

                [facility] [address] [place] [address] 04

 Sep, 2018    

Cervicalgia M54.2 and Essential (primary) hypertension I10

 

                [facility] [address] [place] [id number] [address] 27

 Aug, 2018    Type 

2 diabetes mellitus without complications E11.9

 

                [facility] [address] [place], [place] [id number] [address] 30

 2018    

Gastroenteritis and colitis, viral A08.4 and BMI 40.0-44.9, adult Z68.41

 

                [facility] [address] [place], [place] [id number] [address] 16

 2018    Type 

2 diabetes mellitus without complications E11.9 ; Essential (primary) 
hypertension I10 ; Screening for breast cancer Z12.31 ; Degenerative lumbar disc
M51.36 ; BMI 40.0-44.9, adult Z68.41 and Morbid obesity E66.01

 

                [facility] [address] [address] 

18    BMI 40.0-44.9, 

adult Z68.41 and Other schizoaffective disorders F25.8

 

                [address] [address] , 

18    Other 

schizoaffective disorders F25.8 ; Lumbar degenerative disc disease M51.36 and 
BMI 40.0-44.9, adult Z68.41

 

                [address] [address] 21 May, 20

18    Shortness of 

breath R06.02

 

                [address] [address] 21 May, 20

18     

 

                [address] [address] 15 May, 20

18     

 

                [address] [address] 14 May, 20

18     

 

                [address] [address] 12 May, 20

18     

 

                [address] [address] 08 May, 20

18    Vaginal 

bleeding N93.9

 

                [address] [address] 01 May, 20

18    BMI 40.0-44.9, 

adult Z68.41 and Vaginal bleeding N93.9

 

                [address] [address] 

18    Dysfunction of 

both eustachian tubes H69.83

 

                [address] [address] 10 Apr, 20

18    BMI 40.0-44.9, 

adult Z68.41 ; Essential (primary) hypertension I10 and Impetigo L01.00

 

                [address] [address] 27 Mar, 

18     

 

                [address] [address] 26 Mar, 20

18    Type 2 diabetes

mellitus with diabetic polyneuropathy, without long-term current use of insulin 
E11.42 ; BMI 40.0-44.9, adult Z68.41 ; Other schizoaffective disorders F25.8 ; 
Gastro-esophageal reflux disease without esophagitis K21.9 ; Essential (primary)
hypertension I10 and Impetigo L01.00

 

                [facility]   [address] [place] [address] 16 Mar, 20

18    Type 2 diabetes

mellitus with diabetic polyneuropathy, without long-term current use of insulin 
E11.42

 

                [address] [address] 

18     

 

                [address] [address] 

18     

 

                [address] [address] 

18    Type 2 diabetes

mellitus with diabetic polyneuropathy, without long-term current use of insulin 
E11.42 ; Other schizoaffective disorders F25.8 ; Gastro-esophageal reflux 
disease without esophagitis K21.9 ; Essential (primary) hypertension I10 and 
Impetigo L01.00

 

                [address] [address] 13 Dec, 20

17    Shortness of 

breath R06.02 ; Type 2 diabetes mellitus without complications E11.9 ; BMI 40.0-
44.9, adult Z68.41 and Pre-ulcerative corn or callous L84

 

                [address] [address] 06 Dec, 20

17     

 

                [address] [address] 

17    Medicare 

welcome exam Z00.00 ; BMI 40.0-44.9, adult Z68.41 ; Medicare annual wellness 
visit, initial Z00.00 ; Medicare annual wellness visit, subsequent Z00.00 and 
Encounter for immunization Z23

 

                [address] [address] 

17    Foot callus L84

and Type 2 diabetes mellitus without complications E11.9

 

                [address] [address] 30 Oct, 20

17     

 

                [address] [place] [address] 09 Oct, 

17    Encounter for 

immunization Z23

 

                [facility]   [address] [place] [address] 06 Sep, 

17    Type 2 diabetes

mellitus without complications E11.9 and Polyneuropathy associated with 
underlying disease G63

 

                [facility]   [address] [place] [address] 31 Aug, 

17    Edema R60.9 ; 

Type 2 diabetes mellitus without complications E11.9 and Anemia, unspecified 
type D64.9

 

                [facility]   [address] [place] [address] 23 Aug, 

17    Hip pain, left 

M25.552

 

                [facility] [address] [address] 03 Aug, 

17     

 

                [facility] [place]   [address] [place] [address] 31 Jul, 

17     

 

                [facility] [place]   [address] [place] [address] 07 Jul, 

17     

 

                [facility] [place]   [address] [place] [address] 15 

17     

 

                [facility] [place]   [address] [place] [address] 14 , 

17     

 

                [facility] [place]   [address] [place] [address] 13 , 

17     

 

                [facility] [place]   [address] [place] [address] 12 

17     

 

                [facility] [place]   [address] [place] [address] 08 , 

17     

 

                [facility] [place]   [address] [place] [address] 06 , 

17    Type 2 diabetes

mellitus without complications E11.9 and Essential (primary) hypertension I10

 

                [facility] [place]   [address] [place] [address] 22 May, 20

17     

 

                [facility] [place]   [address] [place] [address] 16 May, 

17    Frequency of 

micturition R35.0 ; Dysuria R30.0 and Type 2 diabetes mellitus without 
complications E11.9

 

                [facility] [place]   [address] [place] [address] 11 Apr, 

17     

 

                [facility] [place]   [address] [place] [address] , 

17     

 

                [facility] [address] [address] , 

17     

 

                [address] [address] , 

17     

 

                [facility] [address] [address] 08 Mar, 20

17    Cellulitis of 

head except face L03.811

 

                [address] [address] 

17     

 

                [address] [address] 

17     

 

                [address] [address] 

17    Flu-like 

symptoms R68.89 and Influenza B J10.1

 

                [address] [address] 

17    Type 2 diabetes

mellitus without complications E11.9

 

                [address] [address] 

17     

 

                [address] [address] 10 Yung, 20

17    Type 2 diabetes

mellitus without complications E11.9 ; Anemia, unspecified type D64.9 ; 
Essential (primary) hypertension I10 and Fatigue R53.83

 

                [address] [address] 

17    Type 2 diabetes

mellitus without complications E11.9 and Foot callus L84

 

                [address] [address] 22 Dec, 20

16     

 

                [address] [address] 05 Dec, 20

16     

 

                [address] [address] 

16     

 

                [address] [address] 

16    Shortness of 

breath R06.02

 

                [address] [address] 

16    Shortness of 

breath R06.02

 

                [address] [address] 06 Oct, 20

16    Shortness of 

breath R06.02

 

                [address] [address] 19 Aug, 20

16    Screening for 

cervical cancer Z12.4 ; Edema R60.9 ; Screening for breast cancer Z12.39 and 
Screening for colon cancer Z12.11

 

                [address] [address] 12 Aug, 20

16    Blood in stool 

K92.1

 

                [address] [address] 11 Aug, 20

16    Anemia, 

unspecified type D64.9

 

                [address] [address] 09 Aug, 20

16    Edema R60.9 ; 

Shortness of breath R06.02 ; Anemia, unspecified type D64.9 and Gastroesophageal
reflux disease without esophagitis K21.9

 

                [address] [address] 05 Aug, 20

16    Shortness of 

breath R06.02 and Edema R60.9

 

                [address] [address] 04 Aug, 20

16     

 

                [address] [address] 04 Aug, 20

16    Shortness of 

breath R06.02 and Edema R60.9

 

                [address] [address] 01 Aug, 20

16     

 

                    [facility] [address] [facility] [place] [address]                                Shortness of breath R06.02

 

                [address] [address] 11 

16    Shortness of 

breath R06.02 ; Edema R60.9 and Gastroesophageal reflux disease without 
esophagitis K21.9

 

                [address] [address] 06 

16    Shortness of 

breath R06.02

 

                [address] [address] 27 

16     

 

                [address] [address] 27 , 

16    Dental 

examination Z01.20

 

                [address] [address] 10 

16     

 

                [address] [address] 10 , 

16     

 

                [address] [address] 09 

16    Edema R60.9 ; 

Type 2 diabetes mellitus without complications E11.9 and Fatigue R53.83

 

                [address] [address] 18 May, 20

16    Edema R60.9 and

Abdominal muscle strain, initial encounter S39.011A

 

                [address] [address] 

16     

 

                [address] [address] 18 

16    Hip pain, right

M25.551 and Edema R60.9

 

                [address] [address] 08 

16     

 

                [address] [address] 04 

16    Edema R60.9 and

Impetigo L01.00

 

                [address] [address] 

16    Type 2 diabetes

mellitus without complications E11.9 and Edema R60.9

 

                [address] [address] 

16     

 

                [facility]   [address] [place] [address] 

16    Contusion of 

thigh, right S70.11XA

 

                [address] [address] 

16    Hip pain, right

M25.551

 

                [address] [address] 

16     

 

                [address] [address] 23 Dec, 20

15    Left hip pain 

M25.552

 

                [address] [address] 04 Dec, 20

15    Acute maxillary

sinusitis, recurrence not specified J01.00 and Vomiting, nausea presence 
unspecified, unspecified intactability, vomiting of unspecified type R11.10

 

                [address] [address] 01 Dec, 20

15    Acute maxillary

sinusitis, recurrence not specified J01.00

 

                [address] [address] 08 Oct, 20

15    Acquired 

spondylolisthesis of lumbosacral region M43.17 ; Encounter for immunization Z23 
and Cholelithiasis K80.20

 

                [address] [address] 28 Sep, 20

15     

 

                [address] [address] 24 Sep, 20

15    Lumbar 

radiculopathy 724.4

 

                [address] [address] 21 Sep, 20

15     

 

                [address] [address] 27 Aug, 20

15    Esophageal 

reflux 530.81

 

                [address] [address] 20 Aug, 20

15    Esophageal 

reflux 530.81 ; Essential hypertension, benign 401.1 and Diabetes mellitus 
without mention of complication, type II or unspecified type, not stated as 
uncontrolled 250.00

 

                [facility] [place]   [address] [place] [address] 07 Aug, 20

15     

 

                [facility] [place]   [address] [place] [address] 05 Aug, 20

15    Myalgia and 

myositis 729.1

 

                [facility] [place]   [address] [place] [address] 

15    Upper 

respiratory infection 465.9 ; Other symptoms involving skin and integumentary 
tissues 782.9 and Factitial dermatitis 698.4

 

                [facility] [place]   [address] [place] [address] 

15    Factitial 

dermatitis 698.4

 

                [facility] [address] [address] 

15     

 

                [facility] [address] [address] 

15    Esophageal 

reflux 530.81

 

                [facility] [address] [address] 

15     

 

                [facility] [address] [address] 

15    Esophageal 

reflux 530.81

 

                z[facility]anorGARRETT [address] [address] 27 May, 20

15     

 

                [facility] [address] [address] 22 May, 20

15    Dyspepsia 536.8

and Epigastric pain 789.06

 

                [facility] [address] [address] 05 May, 20

15     

 

                [facility] [place]   [address] [place] [address] 04 May, 20

15     

 

                [facility] [place]   [address] [place] [address] 04 May, 20

15    Impetigo 684

 

                z[facility]anorGARRETT [address] [address] 

15    Other symptoms 

involving skin and integumentary tissues 782.9 and Seborrheic keratosis 702.19

 

                    [facility] [address] [facility] [place] [address] 14 

2015                                

 

                    [facility] [address] [address] 13 

2015                                

 

                [facility] [place] [address] 20 Mar, 20

15     

 

                    [facility] [address] [address] 20 

Mar, 2015                                

 

                [facility] [place] [address] 18 Mar, 20

15     

 

                    [facility] [address] [address] 18 

Mar, 2015                                

 

                [facility] [place] [address] 16 Mar, 20

15     

 

                    [facility] [id number] [place] [address] 16 

Mar, 2015                                

 

                [facility] [place] [address] 05 Mar, 20

15     

 

                    [facility] [id number] [place] [address] 05 

Mar, 2015                                

 

                [facility] [place] [address] 

15     

 

                    [facility] [id number] [place] [address]                                 

 

                [facility] [place] [address] 

15     

 

                    [facility] [address] [facility] [place] [address]                                 

 

                [facility] [place] [address] 08 Dec, 20

14     

 

                    [facility] [id number] [place] [address] 08 

Dec, 2014                                

 

                [facility] [place] [address] 

14     

 

                    [facility] [address] [facility] [place] [address]                                 

 

                [facility] [place] [address] 08 Oct, 

14     

 

                    [facility] [id number] [place] [address] 08 

Oct,                                 

 

                [facility] [place] [address] 02 Oct, 

14     

 

                    [facility] [id number] [place] [address] 02 

Oct, 2014                                

 

                    [facility] [address] [address] 12 

Sep, 2014                                

 

                [facility] [place] [address] 09 Sep, 

14     

 

                    [facility] [id number] [place] [address] 09 

Sep, 2014                                

 

                    [facility] [id number] [place] [address] 03 

Sep, 2014                                

 

                [facility] [place] [address] 03 Sep, 20

14     

 

                [facility] [place] [address] 

14     

 

                    [facility] [address] [facility] [place] [address]                                 

 

                [facility] [place] [address] 02 Mar, 

14     

 

                    [facility] [address] [facility] [place] [address] 02 

Mar, 2014                                

 

                [facility] [place] [address] 

14     

 

                    [facility] [id number] [place] [address]                                 

 

                    [facility] [id number] [place] [address]                                 

 

                [facility] [place] [address] 

14     

 

                    [facility] [id number] [place] [address]                                 

 

                [facility] [place] [address] 

14     

 

                    [facility] [id number] [place] [address]                                 

 

                [facility] [place] [address] 

13     

 

                    [place] [address] [place] [id number] [place] [address]                                 

 

                [facility] [place] [address] 

13     

 

                    [place] [address] [id number] [place] [address]                                 

 

                [facility] [place] [address] 

13     

 

                    [place] [address] [id number] [place] [address]                                 







IMMUNIZATIONS

No Known Immunizations



SOCIAL HISTORY

Never Assessed



REASON FOR VISIT





PLAN OF CARE





VITAL SIGNS





MEDICATIONS

Unknown Medications



RESULTS

No Results



PROCEDURES

No Known procedures



INSTRUCTIONS





MEDICATIONS ADMINISTERED

No Known Medications



MEDICAL (GENERAL) HISTORY





                    Type                Description         Date

 

                    Medical History     Other symptoms involving skin and integu

mentary tissues  

 

                    Medical History     Impetigo             

 

                    Medical History     Schizoaffective disorder, unspecified  

 

                    Medical History     Essential hypertension, benign  

 

                          Medical History           Diabetes mellitus without me

ntion of complication, type II or 

unspecified type, not stated as uncontrolled  

 

                    Medical History     Urinary frequency    

 

                    Medical History     Encounter for long-term (current) use of

 other medications  

 

                    Medical History     Esophageal reflux    

 

                    Medical History     DIABETES TYPE II     

 

                    Medical History     HIGH BLOOD PRESSURE- pt states sometimes

 it goes low  

 

                    Medical History     BACK TROUBLE         

 

                    Surgical History    cholecystectomy      

 

                    Surgical History    heart cath          2019

 

                    Hospitalization History Surgery(s) only      

 

                          Hospitalization History   possible pneumonia or fluid 

on around heart-sent to [facility] 

heart/thinking COPD                     2019

## 2020-04-11 VITALS — DIASTOLIC BLOOD PRESSURE: 57 MMHG | SYSTOLIC BLOOD PRESSURE: 103 MMHG

## 2020-04-11 VITALS — DIASTOLIC BLOOD PRESSURE: 67 MMHG | SYSTOLIC BLOOD PRESSURE: 112 MMHG

## 2020-04-11 VITALS — DIASTOLIC BLOOD PRESSURE: 71 MMHG | SYSTOLIC BLOOD PRESSURE: 108 MMHG

## 2020-04-11 VITALS — SYSTOLIC BLOOD PRESSURE: 118 MMHG | DIASTOLIC BLOOD PRESSURE: 69 MMHG

## 2020-04-11 VITALS — DIASTOLIC BLOOD PRESSURE: 79 MMHG | SYSTOLIC BLOOD PRESSURE: 116 MMHG

## 2020-04-11 VITALS — SYSTOLIC BLOOD PRESSURE: 121 MMHG | DIASTOLIC BLOOD PRESSURE: 74 MMHG

## 2020-04-11 LAB
ALBUMIN SERPL-MCNC: 3.4 GM/DL (ref 3.2–4.5)
ALP SERPL-CCNC: 87 U/L (ref 40–136)
ALT SERPL-CCNC: 13 U/L (ref 0–55)
APTT PPP: YELLOW S
BACTERIA #/AREA URNS HPF: NEGATIVE /HPF
BARBITURATES UR QL: NEGATIVE
BASOPHILS # BLD AUTO: 0 10^3/UL (ref 0–0.1)
BASOPHILS NFR BLD AUTO: 0 % (ref 0–10)
BENZODIAZ UR QL SCN: NEGATIVE
BILIRUB SERPL-MCNC: 0.8 MG/DL (ref 0.1–1)
BILIRUB UR QL STRIP: NEGATIVE
BUN/CREAT SERPL: 8
CALCIUM SERPL-MCNC: 7.2 MG/DL (ref 8.5–10.1)
CHLORIDE SERPL-SCNC: 98 MMOL/L (ref 98–107)
CO2 SERPL-SCNC: 15 MMOL/L (ref 21–32)
COCAINE UR QL: NEGATIVE
CREAT SERPL-MCNC: 0.63 MG/DL (ref 0.6–1.3)
EOSINOPHIL # BLD AUTO: 0 10^3/UL (ref 0–0.3)
EOSINOPHIL NFR BLD AUTO: 0 % (ref 0–10)
ERYTHROCYTE [DISTWIDTH] IN BLOOD BY AUTOMATED COUNT: 13.3 % (ref 10–14.5)
FIBRINOGEN PPP-MCNC: CLEAR MG/DL
GFR SERPLBLD BASED ON 1.73 SQ M-ARVRAT: > 60 ML/MIN
GLUCOSE SERPL-MCNC: 154 MG/DL (ref 70–105)
GLUCOSE UR STRIP-MCNC: NEGATIVE MG/DL
HCT VFR BLD CALC: 37 % (ref 35–52)
HGB BLD-MCNC: 12.8 G/DL (ref 11.5–16)
HYALINE CASTS #/AREA URNS LPF: (no result) /LPF
KETONES UR QL STRIP: NEGATIVE
LEUKOCYTE ESTERASE UR QL STRIP: NEGATIVE
LYMPHOCYTES # BLD AUTO: 1 X 10^3 (ref 1–4)
LYMPHOCYTES NFR BLD AUTO: 11 % (ref 12–44)
MANUAL DIFFERENTIAL PERFORMED BLD QL: NO
MCH RBC QN AUTO: 33 PG (ref 25–34)
MCHC RBC AUTO-ENTMCNC: 35 G/DL (ref 32–36)
MCV RBC AUTO: 96 FL (ref 80–99)
METHADONE UR QL SCN: NEGATIVE
METHAMPHETAMINE SCREEN URINE S: NEGATIVE
MONOCYTES # BLD AUTO: 0.4 X 10^3 (ref 0–1)
MONOCYTES NFR BLD AUTO: 4 % (ref 0–12)
NEUTROPHILS # BLD AUTO: 8 X 10^3 (ref 1.8–7.8)
NEUTROPHILS NFR BLD AUTO: 85 % (ref 42–75)
NITRITE UR QL STRIP: NEGATIVE
OPIATES UR QL SCN: POSITIVE
OXYCODONE UR QL: NEGATIVE
PH UR STRIP: 5.5 [PH] (ref 5–9)
PLATELET # BLD: 172 10^3/UL (ref 130–400)
PMV BLD AUTO: 10.4 FL (ref 7.4–10.4)
POTASSIUM SERPL-SCNC: 4.3 MMOL/L (ref 3.6–5)
PROPOXYPH UR QL: NEGATIVE
PROT SERPL-MCNC: 6.3 GM/DL (ref 6.4–8.2)
PROT UR QL STRIP: NEGATIVE
RBC #/AREA URNS HPF: (no result) /HPF
SODIUM SERPL-SCNC: 124 MMOL/L (ref 135–145)
SP GR UR STRIP: 1.02 (ref 1.02–1.02)
SQUAMOUS #/AREA URNS HPF: (no result) /HPF
TRICYCLICS UR QL SCN: NEGATIVE
WBC # BLD AUTO: 9.5 10^3/UL (ref 4.3–11)
WBC #/AREA URNS HPF: (no result) /HPF

## 2020-04-11 RX ADMIN — FENTANYL CITRATE PRN MCG: 50 INJECTION, SOLUTION INTRAMUSCULAR; INTRAVENOUS at 03:43

## 2020-04-11 RX ADMIN — SODIUM CHLORIDE SCH MLS/HR: 900 INJECTION, SOLUTION INTRAVENOUS at 18:04

## 2020-04-11 RX ADMIN — INSULIN ASPART SCH UNIT: 100 INJECTION, SOLUTION INTRAVENOUS; SUBCUTANEOUS at 06:17

## 2020-04-11 RX ADMIN — INSULIN ASPART SCH UNIT: 100 INJECTION, SOLUTION INTRAVENOUS; SUBCUTANEOUS at 21:00

## 2020-04-11 RX ADMIN — SODIUM CHLORIDE SCH MLS/HR: 900 INJECTION, SOLUTION INTRAVENOUS at 20:11

## 2020-04-11 RX ADMIN — INSULIN ASPART SCH UNIT: 100 INJECTION, SOLUTION INTRAVENOUS; SUBCUTANEOUS at 11:13

## 2020-04-11 RX ADMIN — HYDROCODONE BITARTRATE AND ACETAMINOPHEN SCH TAB: 5; 325 TABLET ORAL at 20:02

## 2020-04-11 RX ADMIN — MAGNESIUM SULFATE IN DEXTROSE SCH MLS/HR: 10 INJECTION, SOLUTION INTRAVENOUS at 08:41

## 2020-04-11 RX ADMIN — ASCORBIC ACID, VITAMIN A PALMITATE, CHOLECALCIFEROL, THIAMINE HYDROCHLORIDE, RIBOFLAVIN-5 PHOSPHATE SODIUM, PYRIDOXINE HYDROCHLORIDE, NIACINAMIDE, DEXPANTHENOL, ALPHA-TOCOPHEROL ACETATE, VITAMIN K1, FOLIC ACID, BIOTIN, CYANOCOBALAMIN SCH MLS/HR: 200; 3300; 200; 6; 3.6; 6; 40; 15; 10; 150; 600; 60; 5 INJECTION, SOLUTION INTRAVENOUS at 09:34

## 2020-04-11 RX ADMIN — INSULIN ASPART SCH UNIT: 100 INJECTION, SOLUTION INTRAVENOUS; SUBCUTANEOUS at 16:23

## 2020-04-11 RX ADMIN — HYDROCODONE BITARTRATE AND ACETAMINOPHEN SCH TAB: 5; 325 TABLET ORAL at 15:21

## 2020-04-11 RX ADMIN — ASCORBIC ACID, VITAMIN A PALMITATE, CHOLECALCIFEROL, THIAMINE HYDROCHLORIDE, RIBOFLAVIN-5 PHOSPHATE SODIUM, PYRIDOXINE HYDROCHLORIDE, NIACINAMIDE, DEXPANTHENOL, ALPHA-TOCOPHEROL ACETATE, VITAMIN K1, FOLIC ACID, BIOTIN, CYANOCOBALAMIN SCH MLS/HR: 200; 3300; 200; 6; 3.6; 6; 40; 15; 10; 150; 600; 60; 5 INJECTION, SOLUTION INTRAVENOUS at 16:06

## 2020-04-11 RX ADMIN — SODIUM CHLORIDE SCH MLS/HR: 900 INJECTION, SOLUTION INTRAVENOUS at 07:23

## 2020-04-11 RX ADMIN — HYDROCODONE BITARTRATE AND ACETAMINOPHEN SCH TAB: 5; 325 TABLET ORAL at 10:45

## 2020-04-11 RX ADMIN — ONDANSETRON PRN MG: 2 INJECTION, SOLUTION INTRAMUSCULAR; INTRAVENOUS at 20:01

## 2020-04-11 RX ADMIN — MAGNESIUM SULFATE IN DEXTROSE SCH MLS/HR: 10 INJECTION, SOLUTION INTRAVENOUS at 09:34

## 2020-04-11 NOTE — HISTORY & PHYSICAL-HOSPITALIST
History of Present Illness


HPI/Chief Complaint


This is a 69-year-old white female who was partying because of her daughter's 

birthday and tripped over her son's boot falling striking the left side of her 

head and her left shoulder.  She sustained a comminuted displaced left humerus 

head fracture.  She complains this morning of having pain in her shoulder but 

otherwise is requesting to go home.  I discussed with her that she has a low 

sodium but she says she has had that before.  She has been on some psychiatric 

medications but she doesn't know what this morning for some time for depression.

 She does note that she drinks almost daily but it's usually 3-4 beers.


Source:  patient


Exam Limitations:  no limitations


Date Seen


20


Time Seen by a Provider:  10:00


Attending Physician


Rosemarie Verma MD


PCP


[facility] [place] [facility]


Referring Physician





Date of Admission


2020 at 00:01





Home Medications & Allergies


Home Medications


Reviewed patient Home Medication Reconciliation performed by pharmacy medication

reconciliations technician and/or nursing.


Patients Allergies have been reviewed.





Allergies





Allergies


Coded Allergies


  Penicillins (Verified Allergy, Unknown, 20)





Past Medical-Social-Family Hx


Past Med/Social Hx:  Reviewed Nursing Past Med/Soc Hx, Reviewed and Corrections 

made


Patient Social History


Marrital Status:  


Employed/Student:  retired (Hotel work)


Alcohol Use:  Regular Use (DRINKS DAILY)


Alcohol Beverage of Choice:  Beer


Recreational Drug Use:  No


Smoking Status:  Never a Smoker (CHEWS TOBACCO DAILY)


Former Smoker, Quit:  2006


Type Used:  Smokeless Tobacco


2nd Hand Smoke Exposure:  Yes


Recent Foreign Travel:  No


Contact w/other who traveled:  No


Recent Hopitalizations:  No


Recent Infectious Disease Expo:  No





Immunizations Up To Date


Date of Pneumonia Vaccine:  2018


Date of Influenza Vaccine:  2019





Seasonal Allergies


Seasonal Allergies:  No





Past Medical History


Currently Using CPAP:  No


Currently Using BIPAP:  No


Cardiac:  Chronic Edema/Swelling, Heart Murmur, Hypertension


Pregnant:  No


Reproductive:  No


Sexually Transmitted Disease:  No


HIV/AIDS:  No


Female Reproductive Disorders:  Denies


Menopausal


Musculoskeletal:  Arthritis, Chronic Back Pain


Endocrine:  Diabetes, Non-Insulin dep


Psychosocial:  Anxiety, Depression


History of Blood Disorders:  No


Adverse Reaction to Blood Hopper:  No





Review of Systems


Constitutional:  see HPI


EENTM:  other (Headache)


Respiratory:  no symptoms reported


Cardiovascular:  no symptoms reported


Gastrointestinal:  no symptoms reported


Genitourinary:  no symptoms reported


Musculoskeletal:  joint pain


Skin:  no symptoms reported


Psychiatric/Neurological:  Anxiety





Physical Exam


Physical Exam


Vital Signs





Vital Signs - First Documented








 4/10/20





 22:27


 


Temp 36.8


 


Pulse 53


 


Resp 20


 


B/P (MAP) 128/88 (101)


 


Pulse Ox 97


 


O2 Delivery Room Air





Capillary Refill : Less Than 3 SecondsLess Than 3 Seconds


Height, Weight, BMI


Height: 0'0.00"


Weight: 0lbs. 0.0oz. 0.130563vx; 24.48 BMI


Method:


General Appearance:  WD/WN


HEENT:  Other (Large hematoma and ecchymosis left eye)


Neck:  Limited Range of Motion (In a hard cervical collar)


Respiratory:  Lungs Clear, Normal Breath Sounds, No Accessory Muscle Use, No 

Respiratory Distress


Cardiovascular:  Systolic Murmur (2-3/6)


Gastrointestinal:  Normal Bowel Sounds, Non Tender, Soft


Back:  Normal Inspection


Extremity:  Normal Capillary Refill, No Calf Tenderness, No Pedal Edema, Other 

(Left arm in a sling)


Neurologic/Psychiatric:  Alert, Oriented x3, No Motor/Sensory Deficits, Normal 

Mood/Affect


Skin:  Normal Color, Warm/Dry


Lymphatic:  No Adenopathy





Results


Results/Procedures


Labs


Laboratory Tests


4/10/20 22:31








20 04:38








Patient resulted labs reviewed.


Imaging:  Reviewed Imaging Report





Assessment/Plan


Admission Diagnosis


Comminuted displaced left humeral head fracture


Hyponatremia


Acute alcohol intoxication


Chronic alcohol use


History of depression





Plan for surgical consultation removed cervical collar check urine lites and 

probable discharge tomorrow morning


Admission Status:  Observation





Clinical Quality Measures


DVT/VTE Risk/Contraindication:


Risk Factor Score Per Nursin


RFS Level Per Nursing on Admit:  2=Moderate





Copy


Copies To 1:   [facility]/ROSEMARIE MURRAY MD         2020 10:29

## 2020-04-11 NOTE — NUR
BELLA HAMILTON admitted to room 411-1, with an admitting diagnosis of ALCOHOL 
INTOXICATION, HYPONATREMIA, S/P FALL, FX TO L HUMERUS, AND FALL, on 04/11/20 from ED via 
STRETCHER, accompanied by ED STAFF. BELLA HAMILTON introduced to surroundings, call light, 
bed controls, phone, TV, temperature control, lights, meal times, smoking policy, visitor 
policy, side rail policy, bathrooms and showers.  Patient Rights given to patient in the 
handbook. BELLA HAMILTON verbalizes understanding that [facility] is not responsible for 
the loss or damage to any personal effects or valuables that are kept in the patients 
posession during their hospitalization.  BELLA HAMILTON verbalizes understanding of 
Interdisciplinary Patient Education. Patient and/or family were informed about the Rapid 
Response Team and its purpose.

## 2020-04-11 NOTE — DIAGNOSTIC IMAGING REPORT
INDICATION: Fall with left shoulder pain.



COMPARISON: None.



DISCUSSION: Three views left shoulder were obtained. There is a

comminuted displaced proximal left humeral neck fracture. The

distal shaft is displaced one full shaft width anteriorly. No

dislocation. Soft tissue swelling is noted.



IMPRESSION:

1. Comminuted displaced proximal left humeral neck fracture.



Dictated by: 



  Dictated on workstation # [id number]

## 2020-04-11 NOTE — DIAGNOSTIC IMAGING REPORT
INDICATION: Fall with chest pain.



COMPARISON: 01/18/2017.



DISCUSSION: Single portable supine view of the chest was

obtained. Cardiomegaly is stable. No consolidation, pleural

fluid, or pneumothorax. Comminuted mildly displaced left humeral

neck fracture is noted.



IMPRESSION:

1. Cardiomegaly without failure.

2. Left humeral neck fracture.



Dictated by: 



  Dictated on workstation # [id number]

## 2020-04-11 NOTE — DIAGNOSTIC IMAGING REPORT
INDICATION: Fall with left shoulder pain.



COMPARISON: None.



DISCUSSION: Two views of left humerus were obtained. There is a

mildly displaced comminuted left proximal humeral neck fracture.

No dislocation. Soft tissue swelling is noted. No foreign body.



IMPRESSION:

1. Comminuted displaced proximal humeral neck fracture on the

left.



Dictated by: 



  Dictated on workstation # [id number]

## 2020-04-11 NOTE — CONSULTATION - SURGERY
History of Present Illness


History of Present Illness


Patient Consulted On(rodolfo/time)


20


 13:28


Time Seen by Provider:  12:23


History of Present Illness


Surgery asked to consult regarding Humerus Fracture.





HPI per ED:  PT ARRIVES VIA POV--STATES HER SON AND HIS S.O. DROVE HER HERE, 

STATES SHE "THINKS SHE TRIPPED" AND FELL FACE FIRST ONTO PORCH, DENIES LOSS OF 

CONSCIOUSNESS


DENIES NECK PAIN, C/O PAIN TO LEFT BROW AREA AND PAIN TO LEFT SHOULDER,PT DENIES

PAIN ANYWHERE ELSE, STATES SHE HAS "BEEN PARTYING TONIGHT" FOR DAUGHTER'S BIRTHD

AY--


WITH MULTIPLE FAMILY MEMBERS PRESENT --DESPITE STATE-MANDATED STAY AT HOME 

ORDERS, STATES SHE HAS HAD "6 BEERS" TONIGHT, AND ALSO TOOK HYDROCODONE 

TONIGHT--


STATES SHE TAKES IT FOR ARTHRITIS, PT DOES NOT KNOW ANY OF HER MEDICATIONS OR 

WHAT SHE TAKES THEM FOR, AND DOES NOT KNOW IF SHE IS ON A BLOOD THINNER OR NOT. 





PT ABLE TO STAND/BEAR WEIGHT AND TRANSFER WITHOUT PAIN IN LEGS/HIPS


Location Injury Occurred:  LEFT SHOULDER





When I spoke to pt this afternoon stated she felt ok, mild HA and arm pain.  Had

episode of emesis, but prior to that no real nausea.  Denied neck pain.  Rates 

arm pain as 5 out of 10, left arm in sling.





Allergies and Home Medications


Allergies


Coded Allergies:  


     Penicillins (Verified  Allergy, Unknown, 20)





Home Medications


Aripiprazole 15 Mg Tablet, 15 MG PO DAILY, (Reported)


Dexlansoprazole 60 Mg Cap., 60 MG PO DAILY, (Reported)


Enalapril Maleate 10 Mg Tablet, 10 MG PO DAILY, (Reported)


Fluoxetine HCl 20 Mg Capsule, 20 MG PO DAILY, (Reported)


Furosemide 40 Mg Tablet, 40 MG PO DAILY, (Reported)


Hydrocodone/Acetaminophen 1 Each Tablet, 1 TAB PO Q6H


   Prescribed by: PETER POTTER on 10/22/19 1716


Meloxicam 15 Mg Tablet, 15 MG PO DAILY, (Reported)


Metformin HCl 500 Mg Tablet, 500 MG PO BID, (Reported)


Potassium Chloride 10 Meq Tablet.er, 10 MEQ PO DAILY, (Reported)


Tiotropium Bromide 1 Inh Aerp, 2 INH IH DAILY, (Reported)





Patient Home Medication List


Home Medication List Reviewed:  Yes





Past Medical-Social-Family Hx


Patient Social History


Alcohol Use:  Regular Use (DRINKS DAILY)


Recreational Drug Use:  No


Smoking Status:  Never a Smoker (CHEWS TOBACCO DAILY)


Former Smoker, Quit:  2006


Type Used:  Smokeless Tobacco


2nd Hand Smoke Exposure:  Yes


Recent Foreign Travel:  No


Contact w/Someone Who Travel:  No


Recent Infectious Disease Expo:  No


Recent Hopitalizations:  No





Immunizations Up To Date


Date of Pneumonia Vaccine:  2018


Date of Influenza Vaccine:  2019





Seasonal Allergies


Seasonal Allergies:  No





Surgeries


History of Surgeries:  Yes





Respiratory


History of Respiratory Disorde:  Yes (USES INHALER, SOB)





Cardiovascular


History of Cardiac Disorders:  Yes


Cardiac Disorders:  Chronic Edema/Swelling, Heart Murmur, Hypertension





Neurological


History of Neurological Disord:  No





Reproductive System


Pregnant:  No


Hx Reproductive Disorders:  No


Sexually Transmitted Disease:  No


HIV/AIDS:  No


Female Reproductive Disorders:  Denies


GYN History:  Menopausal





Genitourinary


History of Genitourinary Disor:  No





Gastrointestinal


History of Gastrointestinal Di:  No





Musculoskeletal


History of Musculoskeletal Dis:  Yes (ARTHRITIS IN BACK )


Musculoskeletal Disorders:  Arthritis, Chronic Back Pain





Endocrine


History of Endocrine Disorders:  Yes


Endocrine Disorders:  Diabetes, Non-Insulin dep





HEENT


History of HEENT Disorders:  Yes (POOR DENTITION)





Cancer


History of Cancer:  No





Psychosocial


History of Psychiatric Problem:  Yes


Behavioral Health Disorders:  Anxiety, Depression





Integumentary


History of Skin or Integumenta:  No





Blood Transfusions


History of Blood Disorders:  No


Adverse Reaction to a Blood Tr:  No





Family Medical History


Significant Family History:  Heart Disease (parents and siblings), Diabetes 

(brother  of DM)





Review of Systems-General


Constitutional:  No chills; malaise, weakness


EENTM:  No blurred vision, No double vision, No mouth pain, No mouth swelling, 

No epistaxis


Respiratory:  No cough, No dyspnea on exertion, No hemoptysis


Cardiovascular:  No chest pain, No edema


Gastrointestinal:  No abdominal pain, No jaundice; nausea, vomiting


Genitourinary:  No dysuria, No frequency, No hematuria


Musculoskeletal:  joint pain, joint swelling, muscle stiffness, muscle cramps, 

muscle weakness


Psychiatric/Neurological:  Anxiety, Depressed; Denies Seizure, Denies Tremors


Other


pt denies any hx of abnormal bleeding or bruising





Physical Exam-General Problems


Physical Exam


Vital Signs





Vital Signs - First Documented








 4/10/20





 22:27


 


Temp 36.8


 


Pulse 53


 


Resp 20


 


B/P (MAP) 128/88 (101)


 


Pulse Ox 97


 


O2 Delivery Room Air





Capillary Refill : Less Than 3 SecondsLess Than 3 Seconds


General Appearance:  WD/WN, mild distress


Eyes:  Bilateral Eye PERRL, Bilateral Eye EOMI


HEENT:  pharynx normal; No scleral icterus (R), No scleral icterus (L); other 

(pt has large bruise surrounding left orbit, along brow and under eye)


Neck:  supple; No thyromegaly


Respiratory:  lungs clear, normal breath sounds, no respiratory distress, no 

accessory muscle use


Cardiovascular:  regular rate, rhythm, no murmur


Gastrointestinal:  normal bowel sounds, soft, no organomegaly, no pulsatile mass


Back:  no CVA tenderness, no vertebral tenderness


Extremities:  no pedal edema, no calf tenderness, other (left arm in sling, 

right arm good ROM)


Neurologic/Psychiatric:  normal mood/affect, oriented x 3


Lymphatic:  no adenopathy (neck, axilla or groin)





Data Review


Labs


Laboratory Tests


4/10/20 22:31: 


White Blood Count 8.7, Red Blood Count 4.58, Hemoglobin 15.2, Hematocrit 44, 

Mean Corpuscular Volume 95, Mean Corpuscular Hemoglobin 33, Mean Corpuscular 

Hemoglobin Concent 35, Red Cell Distribution Width 13.5, Platelet Count 209, 

Mean Platelet Volume 9.8, Neutrophils (%) (Auto) 50, Lymphocytes (%) (Auto) 38, 

Monocytes (%) (Auto) 6, Eosinophils (%) (Auto) 5, Basophils (%) (Auto) 1, Neutr

ophils # (Auto) 4.4, Lymphocytes # (Auto) 3.3, Monocytes # (Auto) 0.5, 

Eosinophils # (Auto) 0.5H, Basophils # (Auto) 0.1, Prothrombin Time 18.7H, INR 

Comment 1.5H, Activated Partial Thromboplast Time 39H, Sodium Level 123*L, 

Potassium Level 3.7, Chloride Level 89L, Carbon Dioxide Level 18L, Anion Gap 16H

, Blood Urea Nitrogen 6L, Creatinine 0.83, Estimat Glomerular Filtration Rate > 

60, BUN/Creatinine Ratio 7, Glucose Level 149H, Calcium Level 8.8, Corrected 

Calcium 8.5, Total Bilirubin 0.9, Aspartate Amino Transf (AST/SGOT) 32, Alanine 

Aminotransferase (ALT/SGPT) 17, Alkaline Phosphatase 113, Total Protein 8.4H, 

Albumin 4.4, Acetaminophen Level < 10L, Serum Alcohol 268H


20 00:15: 


Urine Color YELLOW, Urine Clarity CLEAR, Urine pH 5.5, Urine Specific Gravity 

1.025H, Urine Protein NEGATIVE, Urine Glucose (UA) NEGATIVE, Urine Ketones 

NEGATIVE, Urine Nitrite NEGATIVE, Urine Bilirubin NEGATIVE, Urine Urobilinogen 

0.2, Urine Leukocyte Esterase NEGATIVE, Urine RBC (Auto) TRACE-I, Urine RBC 

RARE, Urine WBC NONE, Urine Squamous Epithelial Cells RARE, Urine Crystals NONE,

Urine Bacteria NEGATIVE, Urine Casts PRESENT, Urine Hyaline Casts 2-5H, Urine 

Mucus NEGATIVE, Urine Culture Indicated NO, Urine Opiates Screen POSITIVEH, 

Urine Oxycodone Screen NEGATIVE, Urine Methadone Screen NEGATIVE, Urine 

Propoxyphene Screen NEGATIVE, Urine Barbiturates Screen NEGATIVE, Ur Tricyclic 

Antidepressants Screen NEGATIVE, Urine Phencyclidine Screen NEGATIVE, Urine 

Amphetamines Screen NEGATIVE, Urine Methamphetamines Screen NEGATIVE, Urine 

Benzodiazepines Screen NEGATIVE, Urine Cocaine Screen NEGATIVE, Urine 

Cannabinoids Screen NEGATIVE


20 04:38: 


White Blood Count 9.5, Red Blood Count 3.86L, Hemoglobin 12.8, Hematocrit 37, 

Mean Corpuscular Volume 96, Mean Corpuscular Hemoglobin 33, Mean Corpuscular 

Hemoglobin Concent 35, Red Cell Distribution Width 13.3, Platelet Count 172, 

Mean Platelet Volume 10.4, Neutrophils (%) (Auto) 85H, Lymphocytes (%) (Auto) 

11L, Monocytes (%) (Auto) 4, Eosinophils (%) (Auto) 0, Basophils (%) (Auto) 0, 

Neutrophils # (Auto) 8.0H, Lymphocytes # (Auto) 1.0, Monocytes # (Auto) 0.4, 

Eosinophils # (Auto) 0.0, Basophils # (Auto) 0.0, Sodium Level 124*L, Potassium 

Level 4.3, Chloride Level 98, Carbon Dioxide Level 15L, Anion Gap 11, Blood Urea

Nitrogen 5L, Creatinine 0.63, Estimat Glomerular Filtration Rate > 60, 

BUN/Creatinine Ratio 8, Glucose Level 154H, Calcium Level 7.2L, Corrected 

Calcium 7.7L, Total Bilirubin 0.8, Aspartate Amino Transf (AST/SGOT) 27, Alanine

Aminotransferase (ALT/SGPT) 13, Alkaline Phosphatase 87, Total Protein 6.3L, 

Albumin 3.4, Magnesium Level 1.4L


20 05:15: Glucometer 148H


20 10:59: Glucometer 131H





Assessment/Plan


Left Proximal Humerus Fracture - comminuted


Hyponatremia


EtOH intoxication


Abnormal Coagulation profile


Vomiting





Pt needs to be on free water restriction, I took water out of her room.  I spoke

with Ortho; Humerus does not need to be fixed immediately, but should be fixed 

in the next couple of weeks.  Will recheck


labs to make sure they are normalizing.   Will try liquids (but stick to broths 

and Gatorade) and give anti-emetics as needed.  Pain control, use IS and 

ambulate.





Clinical Quality Measures


DVT/VTE Risk/Contraindication:


Risk Factor Score Per Nursin


RFS Level Per Nursing on Admit:  2=Moderate











NICKY GLASS DO               2020 13:33

## 2020-04-11 NOTE — DIAGNOSTIC IMAGING REPORT
INDICATION: Fall with pelvic pain.



COMPARISON: None.



DISCUSSION: Single view of the pelvis was obtained. No acute

fracture, dislocation, or other osseous abnormality identified.

No significant degenerative disease. Alignment is anatomic. Soft

tissues are unremarkable.



IMPRESSION:

1. Negative pelvis.



Dictated by: 



  Dictated on workstation # [id number]

## 2020-04-11 NOTE — DIAGNOSTIC IMAGING REPORT
PROCEDURE: CT head, face, and cervical spine without contrast.



TECHNIQUE: Multiple contiguous axial images were obtained through

the head, neck, and facial bones without the use of intravenous

contrast. Sagittal and coronal reformations through the cervical

spine and facial bones were also performed. Auto Exposure

Controls were utilized during the CT exam to meet ALARA standards

for radiation dose reduction. 



INDICATION: Fall with head, face, neck pain. Left periorbital

soft tissue swelling.



COMPARISON: 04/06/2016.



DISCUSSION: 

Head/face: Mild diffuse brain volume loss is stable, likely age

related. White matter hypoattenuation is nonspecific, greater

than expected for age related chronic small vessel ischemic

disease. No acute intracranial hemorrhage, mass, midline shift,

or hydrocephalus. Left supraorbital soft tissue hematoma.

Moderate mucosal thickening within the right maxillary sinus.

Mild mucosal thickening within the ethmoid air cells and left

maxillary sinus. No air-fluid level identified. Mastoid air cells

are well-aerated. No acute fracture identified. The orbits appear

symmetrical.



Cervical spine: No acute fracture or subluxation. Degenerative

disease at the C1-C2 and C5-C6 levels are stable. Soft tissues

are unremarkable. Alignment is anatomic.



IMPRESSION:

1. Left supraorbital hematoma. No fracture.

2. No acute intracranial abnormality identified.

3. No acute osseous abnormality identified within cervical spine.

4. Agree with preliminary report. 



Dictated by: 



  Dictated on workstation # [id number]

## 2020-04-12 VITALS — SYSTOLIC BLOOD PRESSURE: 129 MMHG | DIASTOLIC BLOOD PRESSURE: 78 MMHG

## 2020-04-12 VITALS — SYSTOLIC BLOOD PRESSURE: 115 MMHG | DIASTOLIC BLOOD PRESSURE: 68 MMHG

## 2020-04-12 VITALS — DIASTOLIC BLOOD PRESSURE: 70 MMHG | SYSTOLIC BLOOD PRESSURE: 104 MMHG

## 2020-04-12 VITALS — DIASTOLIC BLOOD PRESSURE: 76 MMHG | SYSTOLIC BLOOD PRESSURE: 146 MMHG

## 2020-04-12 VITALS — SYSTOLIC BLOOD PRESSURE: 110 MMHG | DIASTOLIC BLOOD PRESSURE: 67 MMHG

## 2020-04-12 VITALS — SYSTOLIC BLOOD PRESSURE: 109 MMHG | DIASTOLIC BLOOD PRESSURE: 64 MMHG

## 2020-04-12 VITALS — SYSTOLIC BLOOD PRESSURE: 101 MMHG | DIASTOLIC BLOOD PRESSURE: 56 MMHG

## 2020-04-12 LAB
ALBUMIN SERPL-MCNC: 3.2 GM/DL (ref 3.2–4.5)
ALP SERPL-CCNC: 80 U/L (ref 40–136)
ALT SERPL-CCNC: 11 U/L (ref 0–55)
BASOPHILS # BLD AUTO: 0 10^3/UL (ref 0–0.1)
BASOPHILS NFR BLD AUTO: 0 % (ref 0–10)
BILIRUB SERPL-MCNC: 2.1 MG/DL (ref 0.1–1)
BUN/CREAT SERPL: 7
CALCIUM SERPL-MCNC: 7.4 MG/DL (ref 8.5–10.1)
CHLORIDE SERPL-SCNC: 99 MMOL/L (ref 98–107)
CO2 SERPL-SCNC: 18 MMOL/L (ref 21–32)
CREAT SERPL-MCNC: 0.59 MG/DL (ref 0.6–1.3)
EOSINOPHIL # BLD AUTO: 0 10^3/UL (ref 0–0.3)
EOSINOPHIL NFR BLD AUTO: 0 % (ref 0–10)
ERYTHROCYTE [DISTWIDTH] IN BLOOD BY AUTOMATED COUNT: 12.9 % (ref 10–14.5)
GFR SERPLBLD BASED ON 1.73 SQ M-ARVRAT: > 60 ML/MIN
GLUCOSE SERPL-MCNC: 121 MG/DL (ref 70–105)
HCT VFR BLD CALC: 31 % (ref 35–52)
HGB BLD-MCNC: 10.7 G/DL (ref 11.5–16)
LYMPHOCYTES # BLD AUTO: 1 X 10^3 (ref 1–4)
LYMPHOCYTES NFR BLD AUTO: 16 % (ref 12–44)
MANUAL DIFFERENTIAL PERFORMED BLD QL: NO
MCH RBC QN AUTO: 33 PG (ref 25–34)
MCHC RBC AUTO-ENTMCNC: 35 G/DL (ref 32–36)
MCV RBC AUTO: 97 FL (ref 80–99)
MONOCYTES # BLD AUTO: 0.5 X 10^3 (ref 0–1)
MONOCYTES NFR BLD AUTO: 8 % (ref 0–12)
NEUTROPHILS # BLD AUTO: 4.7 X 10^3 (ref 1.8–7.8)
NEUTROPHILS NFR BLD AUTO: 76 % (ref 42–75)
PLATELET # BLD: 148 10^3/UL (ref 130–400)
PMV BLD AUTO: 10.6 FL (ref 7.4–10.4)
POTASSIUM SERPL-SCNC: 4.2 MMOL/L (ref 3.6–5)
PROT SERPL-MCNC: 5.8 GM/DL (ref 6.4–8.2)
SODIUM SERPL-SCNC: 127 MMOL/L (ref 135–145)
WBC # BLD AUTO: 6.1 10^3/UL (ref 4.3–11)

## 2020-04-12 RX ADMIN — ONDANSETRON PRN MG: 2 INJECTION, SOLUTION INTRAMUSCULAR; INTRAVENOUS at 00:10

## 2020-04-12 RX ADMIN — FENTANYL CITRATE PRN MCG: 50 INJECTION, SOLUTION INTRAMUSCULAR; INTRAVENOUS at 00:10

## 2020-04-12 RX ADMIN — ASCORBIC ACID, VITAMIN A PALMITATE, CHOLECALCIFEROL, THIAMINE HYDROCHLORIDE, RIBOFLAVIN-5 PHOSPHATE SODIUM, PYRIDOXINE HYDROCHLORIDE, NIACINAMIDE, DEXPANTHENOL, ALPHA-TOCOPHEROL ACETATE, VITAMIN K1, FOLIC ACID, BIOTIN, CYANOCOBALAMIN SCH MLS/HR: 200; 3300; 200; 6; 3.6; 6; 40; 15; 10; 150; 600; 60; 5 INJECTION, SOLUTION INTRAVENOUS at 09:06

## 2020-04-12 RX ADMIN — INSULIN ASPART SCH UNIT: 100 INJECTION, SOLUTION INTRAVENOUS; SUBCUTANEOUS at 15:34

## 2020-04-12 RX ADMIN — SODIUM CHLORIDE SCH MLS/HR: 900 INJECTION, SOLUTION INTRAVENOUS at 15:48

## 2020-04-12 RX ADMIN — HYDROCODONE BITARTRATE AND ACETAMINOPHEN SCH TAB: 5; 325 TABLET ORAL at 21:01

## 2020-04-12 RX ADMIN — HYDROCODONE BITARTRATE AND ACETAMINOPHEN SCH TAB: 5; 325 TABLET ORAL at 08:29

## 2020-04-12 RX ADMIN — INSULIN ASPART SCH UNIT: 100 INJECTION, SOLUTION INTRAVENOUS; SUBCUTANEOUS at 11:24

## 2020-04-12 RX ADMIN — INSULIN ASPART SCH UNIT: 100 INJECTION, SOLUTION INTRAVENOUS; SUBCUTANEOUS at 05:30

## 2020-04-12 RX ADMIN — UMECLIDINIUM SCH INH: 62.5 AEROSOL, POWDER ORAL at 10:58

## 2020-04-12 RX ADMIN — HYDROCODONE BITARTRATE AND ACETAMINOPHEN SCH TAB: 5; 325 TABLET ORAL at 03:30

## 2020-04-12 RX ADMIN — HYDROCODONE BITARTRATE AND ACETAMINOPHEN SCH TAB: 5; 325 TABLET ORAL at 13:38

## 2020-04-12 RX ADMIN — ENALAPRIL MALEATE SCH MG: 10 TABLET ORAL at 08:29

## 2020-04-12 RX ADMIN — FLUOXETINE SCH MG: 20 CAPSULE ORAL at 08:28

## 2020-04-12 RX ADMIN — POTASSIUM CHLORIDE SCH MEQ: 750 TABLET, FILM COATED, EXTENDED RELEASE ORAL at 08:29

## 2020-04-12 RX ADMIN — SODIUM CHLORIDE SCH MLS/HR: 900 INJECTION, SOLUTION INTRAVENOUS at 13:39

## 2020-04-12 RX ADMIN — PANTOPRAZOLE SODIUM SCH MG: 40 TABLET, DELAYED RELEASE ORAL at 08:29

## 2020-04-12 RX ADMIN — INSULIN ASPART SCH UNIT: 100 INJECTION, SOLUTION INTRAVENOUS; SUBCUTANEOUS at 20:57

## 2020-04-12 NOTE — HISTORY & PHYSICAL-HOSPITALIST
History of Present Illness


HPI/Chief Complaint


This is a 69-year-old white female who was partying because of her daughter's 

birthday and tripped over her son's boot falling striking the left side of her 

head and her left shoulder.  She sustained a comminuted displaced left humerus 

head fracture.  She complains this morning of having pain in her shoulder but 

otherwise is requesting to go home.  I discussed with her that she has a low 

sodium but she says she has had that before.  She has been on some psychiatric 

medications but she doesn't know what this morning for some time for depression.

 She does note that she drinks almost daily but it's usually 3-4 beers.


Date Seen


20


Time Seen by a Provider:  12:00


Attending Physician


Rosemarie Verma MD


[facility]


Referring Physician





Date of Admission


2020 at 12:03





Home Medications & Allergies


Home Medications


Reviewed patient Home Medication Reconciliation performed by pharmacy medication

reconciliations technician and/or nursing.


Patients Allergies have been reviewed.





Allergies





Allergies


Coded Allergies


  Penicillins (Verified Allergy, Unknown, 20)








Past Medical-Social-Family Hx


Past Med/Social Hx:  Reviewed Nursing Past Med/Soc Hx, Reviewed and Corrections 

made


Patient Social History


Marrital Status:  


Employed/Student:  retired (Hotel work)


Alcohol Use:  Regular Use (DRINKS DAILY)


Alcohol Beverage of Choice:  Beer


Recreational Drug Use:  No


Smoking Status:  Never a Smoker (CHEWS TOBACCO DAILY)


Former Smoker, Quit:  2006


Type Used:  Smokeless Tobacco


2nd Hand Smoke Exposure:  Yes


Recent Foreign Travel:  No


Contact w/other who traveled:  No


Recent Hopitalizations:  No


Recent Infectious Disease Expo:  No





Immunizations Up To Date


Date of Pneumonia Vaccine:  2018


Date of Influenza Vaccine:  2019





Seasonal Allergies


Seasonal Allergies:  No





Past Medical History


Currently Using CPAP:  No


Currently Using BIPAP:  No


Cardiac:  Chronic Edema/Swelling, Heart Murmur, Hypertension


Pregnant:  No


Reproductive:  No


Sexually Transmitted Disease:  No


HIV/AIDS:  No


Female Reproductive Disorders:  Denies


Menopausal


Musculoskeletal:  Arthritis, Chronic Back Pain


Endocrine:  Diabetes, Non-Insulin dep


Psychosocial:  Anxiety, Depression


History of Blood Disorders:  No


Adverse Reaction to Blood Hopper:  No





Family History


Heart Disease (parents and siblings), Diabetes (brother  of DM)





Review of Systems


Constitutional:  other (Keeps falling asleep)


EENTM:  no symptoms reported


Respiratory:  no symptoms reported


Cardiovascular:  no symptoms reported


Gastrointestinal:  nausea (Improved)


Musculoskeletal:  joint swelling, muscle pain


Skin:  no symptoms reported


Psychiatric/Neurological:  No Symptoms Reported





Physical Exam


Physical Exam


Vital Signs





Vital Signs - First Documented








 4/10/20





 22:27


 


Temp 36.8


 


Pulse 53


 


Resp 20


 


B/P (MAP) 128/88 (101)


 


Pulse Ox 97


 


O2 Delivery Room Air





Capillary Refill : Less Than 3 SecondsLess Than 3 Seconds


Height, Weight, BMI


Height: 0'0.00"


Weight: 0lbs. 0.0oz. 0.249250ky; 24.48 BMI


Method:


General Appearance:  Chronically ill, Other


HEENT:  Other (Large ecchymosis over the left eye and hematoma)


Neck:  Limited Range of Motion


Respiratory:  Chest Non Tender, Lungs Clear, Normal Breath Sounds, No Accessory 

Muscle Use, No Respiratory Distress


Cardiovascular:  Regular Rate, Rhythm, No Edema, No Gallop, No JVD, No Murmur, 

Normal Peripheral Pulses


Gastrointestinal:  Normal Bowel Sounds, No Organomegaly, Non Tender, Soft


Extremity:  Normal Inspection, Normal Range of Motion, No Pedal Edema, Other 

(Left arm in a sling)


Neurologic/Psychiatric:  Alert, Oriented x3, No Motor/Sensory Deficits, Normal 

Mood/Affect





Results


Results/Procedures


Labs


Laboratory Tests


4/10/20 22:31








20 04:38








20 04:36








Patient resulted labs reviewed.


Imaging:  Reviewed Imaging Report





Assessment/Plan


Admission Diagnosis


Comminuted displaced left humeral head fracture-appreciate Dr. Rowan's 

consultation, he had discussed this with Dr. Cormier, and the patient would 

benefit from an outpatient follow-up appointment with Dr. Cormier


Hyponatremia-improving


Acute alcohol intoxication-resolved


Chronic alcohol use


History of depression





Rather than an H&P this should be a follow-up progress note, plan to discharge i

n the morning if sodium is back up over 130


Admission Status:  Inpatient Order (span 2 midnights)


Reason for Inpatient Admission:  


Patient with a history of chronic alcohol use and hyponatremia





Clinical Quality Measures


DVT/VTE Risk/Contraindication:


Risk Factor Score Per Nursin


RFS Level Per Nursing on Admit:  2=Moderate











ROSEMARIE VERMA MD         2020 12:30

## 2020-04-12 NOTE — PROGRESS NOTE - SURGERY
Subjective


Time Seen by a Provider:  05:14


Subjective/Events-last exam


Pt seen and examined, states she is doing good and had no complaints.  Nurse 

stated the pt has been having some nausea, but no more 


episodes of emesis.  Pain is controlled.


Review of Systems


General:  Fatigue, Malaise


HEENT:  No Visual Changes


Pulmonary:  No Dyspnea, No Cough


Cardiovascular:  No: Chest Pain, Palpitations


Gastrointestinal:  No: Nausea, Vomiting, Abdominal Pain





Objective


Exam





Vital Signs








  Date Time  Temp Pulse Resp B/P (MAP) Pulse Ox O2 Delivery O2 Flow Rate FiO2


 


20 04:00 37.0 62 19 129/78 (95) 92 Room Air  


 


20 00:49 37.1 67 17 109/64 (79) 92 Room Air  


 


20 00:40 37.1       


 


20 00:10 37.3       


 


20 20:32 37.3       


 


20 20:02 37.3       


 


20 20:00     94 Room Air  


 


20 19:26 37.2 80 20 112/67 (82) 94 Room Air  


 


20 15:31 37.3 76 17 116/79 (91) 96 Room Air  


 


20 11:12 37.0 58 16 118/69 (85) 95 Room Air  


 


20 09:19     96 Room Air  


 


20 07:42 36.6 75 24 121/74 (90) 96 Room Air  


 


20 06:14 36.7 64 20 103/57 (72) 95 Room Air  














I & O 


 


 20





 07:00


 


Intake Total 1100 ml


 


Output Total 1400 ml


 


Balance -300 ml





Capillary Refill : Less Than 3 SecondsLess Than 3 Seconds


General Appearance:  WD/WN


HEENT:  Other (Large hematoma and ecchymosis left eye)


Neck:  Limited Range of Motion (In a hard cervical collar)


Respiratory:  Lungs Clear, Normal Breath Sounds, No Accessory Muscle Use, No 

Respiratory Distress


Cardiovascular:  Regular Rate, Rhythm, Systolic Murmur (2-3/6)


Peripheral Pulses:  1+ Dorsalis Pedis (R), 1+ Left Dors-Pedis (L), 1+ Radial 

Pulses (R), 1+ Radial Pulses (L)


Gastrointestinal:  normal bowel sounds, soft, no organomegaly, no pulsatile mass


Extremity:  Normal Capillary Refill, No Calf Tenderness, No Pedal Edema, Other 

(Left arm in a sling)


Neurologic/Psychiatric:  Alert, Oriented x3, Normal Mood/Affect


Skin:  Normal Color, Warm/Dry





Results


Lab


Laboratory Tests


20 10:59: Glucometer 131H


20 15:27: Glucometer 147H


20 19:31: Glucometer 181H


20 04:36: 


White Blood Count 6.1, Red Blood Count 3.20L, Hemoglobin 10.7L, Hematocrit 31L, 

Mean Corpuscular Volume 97, Mean Corpuscular Hemoglobin 33, Mean Corpuscular He

moglobin Concent 35, Red Cell Distribution Width 12.9, Platelet Count 148, Mean 

Platelet Volume 10.6H, Neutrophils (%) (Auto) 76H, Lymphocytes (%) (Auto) 16, 

Monocytes (%) (Auto) 8, Eosinophils (%) (Auto) 0, Basophils (%) (Auto) 0, 

Neutrophils # (Auto) 4.7, Lymphocytes # (Auto) 1.0, Monocytes # (Auto) 0.5, 

Eosinophils # (Auto) 0.0, Basophils # (Auto) 0.0, Sodium Level 127L, Potassium 

Level 4.2, Chloride Level 99, Carbon Dioxide Level 18L, Anion Gap 10, Blood Urea

Nitrogen 4L, Creatinine 0.59L, Estimat Glomerular Filtration Rate > 60, 

BUN/Creatinine Ratio 7, Glucose Level 121H, Calcium Level 7.4L, Corrected 

Calcium 8.0L, Total Bilirubin 2.1H, Aspartate Amino Transf (AST/SGOT) 19, 

Alanine Aminotransferase (ALT/SGPT) 11, Alkaline Phosphatase 80, Total Protein 

5.8L, Albumin 3.2





Assessment/Plan


Left Proximal Humerus Fracture - comminuted


Hyponatremia


EtOH intoxication


Abnormal Coagulation profile


Vomiting





Pt needs to continue with free water restriction and can start a soft diet.  I 

spoke with Ortho; Humerus does not need to be fixed immediately, but should be 

fixed in the next couple of weeks.  Will recheck


labs to make sure they are normalizing.  Anti-emetics as needed, pain control, 

use IS and ambulate.





Clinical Quality Measures


DVT/VTE Risk/Contraindication:


Risk Factor Score Per Nursin


RFS Level Per Nursing on Admit:  2=Moderate











NICKY GLASS DO               2020 06:03

## 2020-04-13 VITALS — DIASTOLIC BLOOD PRESSURE: 77 MMHG | SYSTOLIC BLOOD PRESSURE: 120 MMHG

## 2020-04-13 VITALS — SYSTOLIC BLOOD PRESSURE: 120 MMHG | DIASTOLIC BLOOD PRESSURE: 77 MMHG

## 2020-04-13 LAB
ALBUMIN SERPL-MCNC: 2.9 GM/DL (ref 3.2–4.5)
ALP SERPL-CCNC: 70 U/L (ref 40–136)
ALT SERPL-CCNC: 6 U/L (ref 0–55)
BASOPHILS # BLD AUTO: 0 10^3/UL (ref 0–0.1)
BASOPHILS NFR BLD AUTO: 0 % (ref 0–10)
BILIRUB SERPL-MCNC: 1.3 MG/DL (ref 0.1–1)
BUN/CREAT SERPL: 7
CALCIUM SERPL-MCNC: 7.4 MG/DL (ref 8.5–10.1)
CHLORIDE SERPL-SCNC: 101 MMOL/L (ref 98–107)
CO2 SERPL-SCNC: 22 MMOL/L (ref 21–32)
CREAT SERPL-MCNC: 0.59 MG/DL (ref 0.6–1.3)
EOSINOPHIL # BLD AUTO: 0.2 10^3/UL (ref 0–0.3)
EOSINOPHIL NFR BLD AUTO: 3 % (ref 0–10)
ERYTHROCYTE [DISTWIDTH] IN BLOOD BY AUTOMATED COUNT: 13.5 % (ref 10–14.5)
GFR SERPLBLD BASED ON 1.73 SQ M-ARVRAT: > 60 ML/MIN
GLUCOSE SERPL-MCNC: 97 MG/DL (ref 70–105)
HCT VFR BLD CALC: 30 % (ref 35–52)
HGB BLD-MCNC: 9.9 G/DL (ref 11.5–16)
LYMPHOCYTES # BLD AUTO: 1.9 X 10^3 (ref 1–4)
LYMPHOCYTES NFR BLD AUTO: 27 % (ref 12–44)
MANUAL DIFFERENTIAL PERFORMED BLD QL: NO
MCH RBC QN AUTO: 33 PG (ref 25–34)
MCHC RBC AUTO-ENTMCNC: 33 G/DL (ref 32–36)
MCV RBC AUTO: 100 FL (ref 80–99)
MONOCYTES # BLD AUTO: 0.5 X 10^3 (ref 0–1)
MONOCYTES NFR BLD AUTO: 7 % (ref 0–12)
NEUTROPHILS # BLD AUTO: 4.5 X 10^3 (ref 1.8–7.8)
NEUTROPHILS NFR BLD AUTO: 62 % (ref 42–75)
PLATELET # BLD: 138 10^3/UL (ref 130–400)
PMV BLD AUTO: 9.8 FL (ref 7.4–10.4)
POTASSIUM SERPL-SCNC: 4.1 MMOL/L (ref 3.6–5)
PROT SERPL-MCNC: 5.4 GM/DL (ref 6.4–8.2)
SODIUM SERPL-SCNC: 130 MMOL/L (ref 135–145)
WBC # BLD AUTO: 7.2 10^3/UL (ref 4.3–11)

## 2020-04-13 RX ADMIN — INSULIN ASPART SCH UNIT: 100 INJECTION, SOLUTION INTRAVENOUS; SUBCUTANEOUS at 05:53

## 2020-04-13 RX ADMIN — FLUOXETINE SCH MG: 20 CAPSULE ORAL at 07:39

## 2020-04-13 RX ADMIN — ASCORBIC ACID, VITAMIN A PALMITATE, CHOLECALCIFEROL, THIAMINE HYDROCHLORIDE, RIBOFLAVIN-5 PHOSPHATE SODIUM, PYRIDOXINE HYDROCHLORIDE, NIACINAMIDE, DEXPANTHENOL, ALPHA-TOCOPHEROL ACETATE, VITAMIN K1, FOLIC ACID, BIOTIN, CYANOCOBALAMIN SCH MLS/HR: 200; 3300; 200; 6; 3.6; 6; 40; 15; 10; 150; 600; 60; 5 INJECTION, SOLUTION INTRAVENOUS at 07:38

## 2020-04-13 RX ADMIN — ENALAPRIL MALEATE SCH MG: 10 TABLET ORAL at 07:39

## 2020-04-13 RX ADMIN — POTASSIUM CHLORIDE SCH MEQ: 750 TABLET, FILM COATED, EXTENDED RELEASE ORAL at 07:39

## 2020-04-13 RX ADMIN — HYDROCODONE BITARTRATE AND ACETAMINOPHEN SCH TAB: 5; 325 TABLET ORAL at 03:54

## 2020-04-13 RX ADMIN — PANTOPRAZOLE SODIUM SCH MG: 40 TABLET, DELAYED RELEASE ORAL at 07:39

## 2020-04-13 RX ADMIN — HYDROCODONE BITARTRATE AND ACETAMINOPHEN SCH TAB: 5; 325 TABLET ORAL at 09:43

## 2020-04-13 RX ADMIN — UMECLIDINIUM SCH INH: 62.5 AEROSOL, POWDER ORAL at 07:30

## 2020-04-13 RX ADMIN — SODIUM CHLORIDE SCH MLS/HR: 900 INJECTION, SOLUTION INTRAVENOUS at 02:57

## 2020-04-13 NOTE — NUR
SPOKE WITH THE PT, WENT THRU THE EXT MED HISTORY AND CALLED [facility] TO COMPLETE THE MED REC



ALL MEDS WERE ON THE EXT MED HISTORY AND THE PT WAS ABLE TO TELL ME HOW/WHEN SHE TAKES EACH



OTC MEDS:

TYLENOL PRN

## 2020-04-13 NOTE — PROGRESS NOTE - SURGERY
Subjective


Time Seen by a Provider:  10:16


Subjective/Events-last exam


Pt seen and examined, states she is doing fine with minimal pain.  No other 

complaints.


Review of Systems


General:  Fatigue, Malaise


HEENT:  Head Aches (very minimal)


Pulmonary:  No Dyspnea, No Cough


Cardiovascular:  No: Chest Pain, Palpitations


Gastrointestinal:  No: Nausea, Vomiting, Abdominal Pain





Objective


Exam





Vital Signs








  Date Time  Temp Pulse Resp B/P (MAP) Pulse Ox O2 Delivery O2 Flow Rate FiO2


 


20 08:00 36.0 55 20 120/77 (91) 97 Room Air  


 


20 07:30     94 Room Air  


 


20 23:15 37.0 70 18 115/68 (84) 96 Room Air  


 


20 21:00      Room Air  


 


20 20:12 37.0 74 17 104/70 (81) 96 Room Air  


 


20 16:37 37.5 51 18 110/67 (81) 95 Room Air  


 


20 12:00 36.7 53 18 101/56 (71) 92 Room Air  














I & O 


 


 20





 07:00


 


Intake Total 640 ml


 


Output Total 350 ml


 


Balance 290 ml





Capillary Refill : Less Than 3 SecondsLess Than 3 Seconds


General Appearance:  No Apparent Distress, Chronically ill


HEENT:  Moist Mucous Membranes, Other (Large ecchymosis over the left eye and 

hematoma)


Neck:  Limited Range of Motion


Respiratory:  Chest Non Tender, Lungs Clear, Normal Breath Sounds, No Accessory 

Muscle Use, No Respiratory Distress


Cardiovascular:  Regular Rate, Rhythm, No Edema, No Murmur


Peripheral Pulses:  1+ Dorsalis Pedis (R), 1+ Left Dors-Pedis (L), 1+ Radial 

Pulses (R), 1+ Radial Pulses (L)


Gastrointestinal:  normal bowel sounds, soft, no organomegaly, no pulsatile mass


Extremity:  Normal Inspection, Normal Range of Motion, No Pedal Edema, Other 

(Left arm in a sling)


Neurologic/Psychiatric:  Alert, Oriented x3, Normal Mood/Affect





Results


Lab


Laboratory Tests


20 11:03: Glucometer 175H


20 16:42: Glucometer 159H


20 20:17: Glucometer 128H


20 05:04: 


White Blood Count 7.2, Red Blood Count 2.99L, Hemoglobin 9.9L, Hematocrit 30L, 

Mean Corpuscular Volume 100H, Mean Corpuscular Hemoglobin 33, Mean Corpuscular 

Hemoglobin Concent 33, Red Cell Distribution Width 13.5, Platelet Count 138, 

Mean Platelet Volume 9.8, Neutrophils (%) (Auto) 62, Lymphocytes (%) (Auto) 27, 

Monocytes (%) (Auto) 7, Eosinophils (%) (Auto) 3, Basophils (%) (Auto) 0, 

Neutrophils # (Auto) 4.5, Lymphocytes # (Auto) 1.9, Monocytes # (Auto) 0.5, 

Eosinophils # (Auto) 0.2, Basophils # (Auto) 0.0, Sodium Level 130L, Potassium 

Level 4.1, Chloride Level 101, Carbon Dioxide Level 22, Anion Gap 7, Blood Urea 

Nitrogen 4L, Creatinine 0.59L, Estimat Glomerular Filtration Rate > 60, 

BUN/Creatinine Ratio 7, Glucose Level 97, Calcium Level 7.4L, Corrected Calcium 

8.3L, Total Bilirubin 1.3H, Aspartate Amino Transf (AST/SGOT) 15, Alanine 

Aminotransferase (ALT/SGPT) 6, Alkaline Phosphatase 70, Total Protein 5.4L, 

Albumin 2.9L





Assessment/Plan


Left Proximal Humerus Fracture - comminuted


Hyponatremia-improving


EtOH intoxication


Abnormal Coagulation profile


Vomiting





Pt should continue with free water restriction at home.  I will see her in my 

office this week so we can set up appt. with Ortho.





Clinical Quality Measures


DVT/VTE Risk/Contraindication:


Risk Factor Score Per Nursin


RFS Level Per Nursing on Admit:  2=Moderate











NICKY GLASS DO               2020 10:51

## 2020-04-13 NOTE — DISCHARGE SUMMARY
Discharge [facility]


Reconcile Patient Problems


Problems Reviewed?:  Yes





Discharge Medications


New, Converted or Re-Newed RX:  RX on Chart


New Medications:  


Aripiprazole (Abilify) 15 Mg Tablet


15 MG PO DAILY, #30 TAB





Enalapril Maleate (Enalapril Maleate) 10 Mg Tablet


10 MG PO DAILY, #30 TAB





Fluoxetine HCl (Fluoxetine HCl) 20 Mg Capsule


20 MG PO DAILY, #30 CAP





Hydrocodone/Acetaminophen (Hydrocodone-Acetamin 5-325 mg) 1 Each Tablet


1 TAB PO Q6H, #30 TAB





 


Continued Medications:  


Amlodipine Besylate (Amlodipine Besylate) 5 Mg Tablet


5 MG PO DAILY, TAB





Apixaban (Eliquis) 5 Mg Tablet


5 MG PO BID, #60 TAB (This prescription has been renewed)





Metoprolol Succinate (Metoprolol Succinate) 25 Mg Tab.er.24h


25 MG PO DAILY, TAB





 


Discontinued Medications:  


Meloxicam (Meloxicam) 7.5 Mg Tablet


7.5 MG PO HS, TAB











Patient Instructions


Goal/Follow Up Appt:  


You have a curbside appt with Dr Pineda on April 21th @ 120 PM





You will have a f.u appt with Dr Rowan and he will then refer you to Dr Cormier


Patient Instructions:  


- Hold your blood thinner (Eliquis) until seen by Dr Rowan this week





Activity & Diet


Discharge Diet:  Cardiac Diet





Copy


Copies To 1:   STEVE PINEDA MD, HOLLY R MD               Apr 13, 2020 11:07

## 2020-04-13 NOTE — NUR
RD ASSESSMENT 



PMHx: DM; HTN; ETOH use; current admit: humerus fracture



PT INTERACTION: Pt was awake and pleasant during nutrition assessment. Pt states current 
appetite is "eating better here than at home." Note avg PO intake 58% x2d, per chart review. 
Pt states following a regular diet at home, and has no issues with chewing/swallowing food. 
Pt states some recent issues with nausea/vomiting. Note 1 episode of emesis on 4/11, per 
chart review. Pt states no recent issues with constipation/diarrhea, and that her last BM 
was 4/12. Note pt not currently on bowel regimen per chart review. Pt states no recent wt 
changes. Note recent 5# wt gain x4mon, per chart review. Pt states current DM management as 
pretty good, and that her average blood glucose level is between 100-130. Note unable to 
determine recent HbA1c, per chart review. 



ABNORMAL NUTRITION-RELATED LAB VALUES

LOW: Na 130; BUN 4; cr 0.59; Ca 7.4; Pro 5.4; alb 2.9

HIGH: 



Est. kcal needs: 5909-3867 kcal | 25-30 kcal/kg  

Est. Pro needs:  56-68 g Pro | 0.8-1.0 g Pro/kg 



PES STATEMENT: Inadequate oral intake (NI-2.1) related to loss of appetite | nausea | 
vomiting as evidenced by pt interview | avg PO intake 58% x2d



INTERVENTION:  

Continue with current diet order of Regular diet. 

Pt may benefit from consistent CHO diet, if blood glucose levels become elevated. 

Pt may benefit from nutrition supplementation if PO intake declines. 

Will continue to follow and reassess as pt needs, intake, and status change.



MONITOR/EVALUATE:  

PO Intake; Plan of Care; Hydration Status; Weight Status; Lab Values 





TORSTEN Hollingsworth, MS, RD, LD

## 2020-04-13 NOTE — DISCHARGE SUMMARY
Diagnosis/Chief Complaint


Date of Admission


2020 at 12:03


Date of Discharge


2020


Admission Diagnosis


Admission Diagnosis


Acute EtOH Intoxication


L Humeral head fracture


Chronic EtOH use


Left Eye Hematoma


Hyponatremia





Discharge Diagnosis


See Above





Discharge Summary-Simple/Stand


Consultations


Dr Rowan: General Surgery


Discharge Physical Examination


Allergies:  


Coded Allergies:  


     Penicillins (Verified  Allergy, Unknown, 20)


Vitals & I&Os





Vital Sign - Last 12Hours








  Date Time  Temp Pulse Resp B/P (MAP) Pulse Ox O2 Delivery O2 Flow Rate FiO2


 


20 08:00 36.0 55 20 120/77 (91) 97 Room Air  














Intake and Output 


 


 20





 00:00


 


Intake Total 390 ml


 


Output Total 250 ml


 


Balance 140 ml








General Appearance:  Alert, Oriented X3, Cooperative, No Acute Distress


HEENT:  EOMI, Mucous Memb Moist/Pink, Other (Large contusion present around left

eye, minimal swelling and mild ttp)


Respiratory:  Clear to Auscultation, Normal Air Movement


Cardiovascular:  Regular Rate, No Murmurs


Abdominal:  Normal Bowel Sounds, Soft, No Tenderness, No Masses


Extremities:  Other (Left arm in sling)


Neuro:  Normal Speech, Sensation Intact, Cranial Nerves 3-12 NL





Hospital Course


Was the Problem List Reviewed?:  Yes


See final discharge diagnosis.





Discussion & Recommendations


68 yo F that was admitted with acute EtOH intoxication following a fall that 

resulted in left eye contusion and L humeral head fracture. Patient is 

controlled on PO meds and able to transfer with assist x1. She is able to use 

raised walker for ambulation. Patient has left arm in sling. She was seen by Dr Rowan and will followup as outpatient. Patient desires to go home today. Will 

have close f.u with surgery, ortho surgery and myself in clinic.





Discharge


Condition at discharge


Stable


Instructions to patient/family


Please see electronic discharge instructions given to patient.


Discharge Medications


Reviewed and agree with Discharge Medication list on patient's Discharge 

Instruction sheet





Clinical Quality Measures


DVT/VTE Risk/Contraindication:


Risk Factor Score Per Nursin


RFS Level Per Nursing on Admit:  2=Moderate





Copy


Copies To 1:   STEVE CRESPO MD, HOLLY R MD               2020 10:50

## 2020-06-16 ENCOUNTER — HOSPITAL ENCOUNTER (OUTPATIENT)
Dept: HOSPITAL 75 - RAD | Age: 70
End: 2020-06-16
Attending: FAMILY MEDICINE
Payer: MEDICARE

## 2020-06-16 DIAGNOSIS — Z13.820: Primary | ICD-10-CM

## 2020-06-16 DIAGNOSIS — M81.0: ICD-10-CM

## 2020-06-16 PROCEDURE — 77080 DXA BONE DENSITY AXIAL: CPT

## 2020-06-16 NOTE — DIAGNOSTIC IMAGING REPORT
INDICATION: Postmenopausal state, screening for osteoporosis



COMPARISON: None available



FINDINGS:



AP Spine L1-L4:  

[BMD (g/cm2): 0.837] [T-Score: -3.0] [Z-Score: -1.1]

[BMD Previous: NA] [BMD % Change: NA]



LT Hip Neck:       

[BMD (g/cm2): 0.796] [T-Score: -1.7] [Z-Score: 0.2]



LT Hip Total:       

[BMD (g/cm2):0.782] [T-Score:-1.8] [Z-Score: -0.1]

[BMD Previous: NA] [BMD % Change: NA]



RT Hip Neck:      

[BMD (g/cm2):0..828] [T-Score:-1.5] [Z-Score:0.4]



RT Hip Total:      

[BMD (g/cm2):0.826] [T-score:-1.4] [Z-Score:0.3]

[BMD Previous:NA] [BMD % Change:NA]



*Indicates significant change from prior examination based on 95%

confidence level.



World Health Organization criteria for BMD interpretation

classify patients as Normal (T-score at or above -1.0),

Osteopenic (T-score between -1.0 and -2.5) or Osteoporotic

(T-score at or below -2.5).



LIMITATIONS AND MODIFICATION:  None.



IMPRESSION:

1. Osteoporosis.

2. Baseline examination.

3. See below National Osteoporosis Foundation guidelines on when

to potentially initiate pharmacologic therapy. 



Based on the National Osteoporosis Foundation Guidelines,

pharmacologic treatment should be initiated in any of the

following, unless clinical conditions suggest otherwise:



*  Any patient with prior fragility fracture of the hip or

vertebrae. A spine fracture indicates 5X risk for subsequent

spine fracture and 2X risk for subsequent hip fracture.



*  Osteoporosis (T-score <-2.5).



*  Postmenopausal women and men age 50 and older with low bone

mass/osteopenia (T-score between -1.0 and -2.5) by DXA and

10-year major osteoporotic fracture greater than 20% or a 10-year

probability of hip fracture greater than 3%. These fracture risks

are supplied above in the FRAX score, if applicable.



*  Clinician judgement and/or patient preferences may indicate

treatment for people with 10-year fracture probabilities above or

below these levels.



Dictated by: 



  Dictated on workstation # [id number]

## 2020-09-28 ENCOUNTER — HOSPITAL ENCOUNTER (OUTPATIENT)
Dept: HOSPITAL 75 - CARD | Age: 70
End: 2020-09-28
Attending: INTERNAL MEDICINE
Payer: MEDICARE

## 2020-09-28 DIAGNOSIS — I48.0: Primary | ICD-10-CM

## 2020-09-28 DIAGNOSIS — I65.29: ICD-10-CM

## 2020-09-28 DIAGNOSIS — I08.3: ICD-10-CM

## 2020-09-28 DIAGNOSIS — I11.9: ICD-10-CM

## 2020-09-28 PROCEDURE — 93306 TTE W/DOPPLER COMPLETE: CPT

## 2020-10-02 ENCOUNTER — HOSPITAL ENCOUNTER (OUTPATIENT)
Dept: HOSPITAL 75 - CARD | Age: 70
Discharge: HOME | End: 2020-10-02
Attending: INTERNAL MEDICINE
Payer: MEDICARE

## 2020-10-02 DIAGNOSIS — I35.0: ICD-10-CM

## 2020-10-02 DIAGNOSIS — I48.0: ICD-10-CM

## 2020-10-02 DIAGNOSIS — Z72.0: ICD-10-CM

## 2020-10-02 DIAGNOSIS — I65.29: ICD-10-CM

## 2020-10-02 DIAGNOSIS — I10: ICD-10-CM

## 2020-10-02 DIAGNOSIS — Z53.9: Primary | ICD-10-CM

## 2020-10-02 PROCEDURE — 93017 CV STRESS TEST TRACING ONLY: CPT

## 2020-10-02 PROCEDURE — 78452 HT MUSCLE IMAGE SPECT MULT: CPT

## 2020-10-06 NOTE — STRESS TEST
DATE OF SERVICE:  10/02/2020



RESTING AND POST REGADENOSON TECHNETIUM-99M TETROFOSMIN SPECT CT IMAGING



ORDERING PHYSICIAN:

Dr. Chin.



PRIMARY PHYSICIAN:

Dr. Pineda.



CLINICAL DIAGNOSES:

Paroxysmal atrial fibrillation, shortness of breath, hypertension.



Baseline images were carried out after injection of 10.2 mCi of technetium-99m

Tetrofosmin.  This was followed by 0.4 mg regadenoson and 30.6 mCi of

technetium-99m Tetrofosmin for stress imaging.  The electrocardiogram showed

atrial fibrillation at baseline.  It did not change significantly with

regadenoson infusion.  The patient noted some shortness of breath and abdominal

discomfort after regadenoson infusion, which resolved in a few minutes.  Review

of images at rest and following stress does not indicate any significant

perfusion defects consistent with significant myocardial ischemia or infarction.

 Gated images show normal global left ventricular systolic function with normal

regional wall motion.  Left ventricular ejection fraction is calculated to be

68%.  Left ventricular end diastolic volume is 49 mL.  TID is absent (0.97).



CONCLUSIONS:

1.  This study does not indicate significant myocardial ischemia or infarction.

2.  Normal regional wall motion.

3.  Normal global left ventricular systolic function with a calculated ejection

fraction of 68%.





Job ID: [id number]

DocumentID: [id number]

Dictated Date:  10/06/2020 12:36:19

Transcription Date: 10/06/2020 12:45:34

Dictated By: JEAN-PAUL CHIN MD, MA, FACP, FACC,

## 2021-03-25 ENCOUNTER — HOSPITAL ENCOUNTER (OUTPATIENT)
Dept: HOSPITAL 75 - REHAB | Age: 71
LOS: 39 days | Discharge: HOME | End: 2021-05-03
Attending: ORTHOPAEDIC SURGERY
Payer: MEDICARE
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
## 2021-03-25 DIAGNOSIS — S49.92XA: Primary | ICD-10-CM

## 2021-03-25 DIAGNOSIS — Z96.612: ICD-10-CM

## 2021-10-25 ENCOUNTER — HOSPITAL ENCOUNTER (INPATIENT)
Dept: HOSPITAL 75 - ER | Age: 71
LOS: 3 days | Discharge: HOME | DRG: 871 | End: 2021-10-28
Attending: FAMILY MEDICINE | Admitting: FAMILY MEDICINE
Payer: MEDICARE

## 2021-10-25 VITALS — SYSTOLIC BLOOD PRESSURE: 92 MMHG | DIASTOLIC BLOOD PRESSURE: 62 MMHG

## 2021-10-25 VITALS — DIASTOLIC BLOOD PRESSURE: 73 MMHG | SYSTOLIC BLOOD PRESSURE: 117 MMHG

## 2021-10-25 VITALS — HEIGHT: 62.99 IN | WEIGHT: 164.69 LBS | BODY MASS INDEX: 29.18 KG/M2

## 2021-10-25 VITALS — DIASTOLIC BLOOD PRESSURE: 115 MMHG | SYSTOLIC BLOOD PRESSURE: 123 MMHG

## 2021-10-25 VITALS — DIASTOLIC BLOOD PRESSURE: 77 MMHG | SYSTOLIC BLOOD PRESSURE: 134 MMHG

## 2021-10-25 VITALS — SYSTOLIC BLOOD PRESSURE: 107 MMHG | DIASTOLIC BLOOD PRESSURE: 66 MMHG

## 2021-10-25 VITALS — DIASTOLIC BLOOD PRESSURE: 72 MMHG | SYSTOLIC BLOOD PRESSURE: 113 MMHG

## 2021-10-25 VITALS — SYSTOLIC BLOOD PRESSURE: 102 MMHG | DIASTOLIC BLOOD PRESSURE: 75 MMHG

## 2021-10-25 VITALS — DIASTOLIC BLOOD PRESSURE: 71 MMHG | SYSTOLIC BLOOD PRESSURE: 105 MMHG

## 2021-10-25 VITALS — SYSTOLIC BLOOD PRESSURE: 103 MMHG | DIASTOLIC BLOOD PRESSURE: 69 MMHG

## 2021-10-25 VITALS — SYSTOLIC BLOOD PRESSURE: 85 MMHG | DIASTOLIC BLOOD PRESSURE: 58 MMHG

## 2021-10-25 VITALS — SYSTOLIC BLOOD PRESSURE: 126 MMHG | DIASTOLIC BLOOD PRESSURE: 76 MMHG

## 2021-10-25 VITALS — DIASTOLIC BLOOD PRESSURE: 86 MMHG | SYSTOLIC BLOOD PRESSURE: 132 MMHG

## 2021-10-25 VITALS — DIASTOLIC BLOOD PRESSURE: 79 MMHG | SYSTOLIC BLOOD PRESSURE: 124 MMHG

## 2021-10-25 DIAGNOSIS — Z79.899: ICD-10-CM

## 2021-10-25 DIAGNOSIS — Z88.0: ICD-10-CM

## 2021-10-25 DIAGNOSIS — F17.220: ICD-10-CM

## 2021-10-25 DIAGNOSIS — I10: ICD-10-CM

## 2021-10-25 DIAGNOSIS — F41.9: ICD-10-CM

## 2021-10-25 DIAGNOSIS — A41.9: Primary | ICD-10-CM

## 2021-10-25 DIAGNOSIS — F17.210: ICD-10-CM

## 2021-10-25 DIAGNOSIS — I48.20: ICD-10-CM

## 2021-10-25 DIAGNOSIS — R65.21: ICD-10-CM

## 2021-10-25 DIAGNOSIS — L89.302: ICD-10-CM

## 2021-10-25 DIAGNOSIS — E87.1: ICD-10-CM

## 2021-10-25 DIAGNOSIS — E11.9: ICD-10-CM

## 2021-10-25 DIAGNOSIS — F32.A: ICD-10-CM

## 2021-10-25 DIAGNOSIS — Z20.822: ICD-10-CM

## 2021-10-25 LAB
ALBUMIN SERPL-MCNC: 3.3 GM/DL (ref 3.2–4.5)
ALP SERPL-CCNC: 76 U/L (ref 40–136)
ALT SERPL-CCNC: 10 U/L (ref 0–55)
APTT BLD: 33 SEC (ref 24–35)
APTT PPP: YELLOW S
ARTERIAL PATENCY WRIST A: (no result)
BACTERIA #/AREA URNS HPF: (no result) /HPF
BASE EXCESS STD BLDA CALC-SCNC: -4.7 MMOL/L (ref -2.5–2.5)
BASOPHILS # BLD AUTO: 0 10^3/UL (ref 0–0.1)
BASOPHILS NFR BLD AUTO: 0 % (ref 0–10)
BDY SITE: (no result)
BILIRUB SERPL-MCNC: 1.3 MG/DL (ref 0.1–1)
BILIRUB UR QL STRIP: NEGATIVE
BODY TEMPERATURE: 38.4
BUN/CREAT SERPL: 9
CALCIUM SERPL-MCNC: 8.5 MG/DL (ref 8.5–10.1)
CHLORIDE SERPL-SCNC: 93 MMOL/L (ref 98–107)
CO2 BLDA CALC-SCNC: 19.7 MMOL/L (ref 21–31)
CO2 SERPL-SCNC: 15 MMOL/L (ref 21–32)
CREAT SERPL-MCNC: 0.79 MG/DL (ref 0.6–1.3)
EOSINOPHIL # BLD AUTO: 0 10^3/UL (ref 0–0.3)
EOSINOPHIL NFR BLD AUTO: 0 % (ref 0–10)
FIBRINOGEN PPP-MCNC: CLEAR MG/DL
GFR SERPLBLD BASED ON 1.73 SQ M-ARVRAT: 71 ML/MIN
GLUCOSE SERPL-MCNC: 119 MG/DL (ref 70–105)
GLUCOSE UR STRIP-MCNC: NEGATIVE MG/DL
HCT VFR BLD CALC: 38 % (ref 35–52)
HGB BLD-MCNC: 12.8 G/DL (ref 11.5–16)
HYALINE CASTS #/AREA URNS LPF: (no result) /LPF
INHALED O2 FLOW RATE: 2 L/MIN
INR PPP: 1.3 (ref 0.8–1.4)
KETONES UR QL STRIP: (no result)
LEUKOCYTE ESTERASE UR QL STRIP: (no result)
LYMPHOCYTES # BLD AUTO: 0.3 10^3/UL (ref 1–4)
LYMPHOCYTES NFR BLD AUTO: 2 % (ref 12–44)
MANUAL DIFFERENTIAL PERFORMED BLD QL: YES
MCH RBC QN AUTO: 29 PG (ref 25–34)
MCHC RBC AUTO-ENTMCNC: 34 G/DL (ref 32–36)
MCV RBC AUTO: 86 FL (ref 80–99)
MONOCYTES # BLD AUTO: 0.3 10^3/UL (ref 0–1)
MONOCYTES NFR BLD AUTO: 2 % (ref 0–12)
MONOCYTES NFR BLD: 3 %
NEUTROPHILS # BLD AUTO: 12.6 10^3/UL (ref 1.8–7.8)
NEUTROPHILS NFR BLD AUTO: 95 % (ref 42–75)
NEUTS BAND NFR BLD MANUAL: 76 %
NEUTS BAND NFR BLD: 18 %
NITRITE UR QL STRIP: NEGATIVE
PCO2 BLDA: 31 MMHG (ref 35–45)
PH BLDA: 7.41 [PH] (ref 7.37–7.43)
PH UR STRIP: 6 [PH] (ref 5–9)
PLATELET # BLD: 241 10^3/UL (ref 130–400)
PMV BLD AUTO: 9.3 FL (ref 9–12.2)
PO2 BLDA: 94 MMHG (ref 79–93)
POTASSIUM SERPL-SCNC: 3.6 MMOL/L (ref 3.6–5)
PROT SERPL-MCNC: 6.4 GM/DL (ref 6.4–8.2)
PROT UR QL STRIP: NEGATIVE
PROTHROMBIN TIME: 16.2 SEC (ref 12.2–14.7)
RBC #/AREA URNS HPF: (no result) /HPF
SAO2 % BLDA FROM PO2: 98 % (ref 94–100)
SODIUM SERPL-SCNC: 126 MMOL/L (ref 135–145)
SP GR UR STRIP: 1.01 (ref 1.02–1.02)
SQUAMOUS #/AREA URNS HPF: (no result) /HPF
TOXIC GRANULES BLD QL SMEAR: (no result)
VARIANT LYMPHS NFR BLD MANUAL: 3 %
VENTILATION MODE VENT: NO
WBC # BLD AUTO: 13.3 10^3/UL (ref 4.3–11)
WBC #/AREA URNS HPF: (no result) /HPF

## 2021-10-25 PROCEDURE — 87040 BLOOD CULTURE FOR BACTERIA: CPT

## 2021-10-25 PROCEDURE — 80048 BASIC METABOLIC PNL TOTAL CA: CPT

## 2021-10-25 PROCEDURE — 87636 SARSCOV2 & INF A&B AMP PRB: CPT

## 2021-10-25 PROCEDURE — 85025 COMPLETE CBC W/AUTO DIFF WBC: CPT

## 2021-10-25 PROCEDURE — 83690 ASSAY OF LIPASE: CPT

## 2021-10-25 PROCEDURE — 85610 PROTHROMBIN TIME: CPT

## 2021-10-25 PROCEDURE — 85007 BL SMEAR W/DIFF WBC COUNT: CPT

## 2021-10-25 PROCEDURE — 36415 COLL VENOUS BLD VENIPUNCTURE: CPT

## 2021-10-25 PROCEDURE — 83605 ASSAY OF LACTIC ACID: CPT

## 2021-10-25 PROCEDURE — 84100 ASSAY OF PHOSPHORUS: CPT

## 2021-10-25 PROCEDURE — 84145 PROCALCITONIN (PCT): CPT

## 2021-10-25 PROCEDURE — 71045 X-RAY EXAM CHEST 1 VIEW: CPT

## 2021-10-25 PROCEDURE — 87088 URINE BACTERIA CULTURE: CPT

## 2021-10-25 PROCEDURE — 36600 WITHDRAWAL OF ARTERIAL BLOOD: CPT

## 2021-10-25 PROCEDURE — 81000 URINALYSIS NONAUTO W/SCOPE: CPT

## 2021-10-25 PROCEDURE — 93005 ELECTROCARDIOGRAM TRACING: CPT

## 2021-10-25 PROCEDURE — 80053 COMPREHEN METABOLIC PANEL: CPT

## 2021-10-25 PROCEDURE — 85730 THROMBOPLASTIN TIME PARTIAL: CPT

## 2021-10-25 PROCEDURE — 86141 C-REACTIVE PROTEIN HS: CPT

## 2021-10-25 PROCEDURE — 74177 CT ABD & PELVIS W/CONTRAST: CPT

## 2021-10-25 PROCEDURE — 83735 ASSAY OF MAGNESIUM: CPT

## 2021-10-25 PROCEDURE — 85027 COMPLETE CBC AUTOMATED: CPT

## 2021-10-25 PROCEDURE — 82805 BLOOD GASES W/O2 SATURATION: CPT

## 2021-10-25 RX ADMIN — SODIUM CHLORIDE, SODIUM LACTATE, POTASSIUM CHLORIDE, AND CALCIUM CHLORIDE SCH MLS/HR: 600; 310; 30; 20 INJECTION, SOLUTION INTRAVENOUS at 19:38

## 2021-10-25 RX ADMIN — VASOPRESSIN SCH MLS/HR: 20 INJECTION INTRAVENOUS at 14:31

## 2021-10-25 RX ADMIN — Medication SCH MLS/HR: at 14:30

## 2021-10-25 RX ADMIN — MICONAZOLE NITRATE SCH GM: 20 POWDER TOPICAL at 19:39

## 2021-10-25 RX ADMIN — APIXABAN SCH MG: 5 TABLET, FILM COATED ORAL at 19:39

## 2021-10-25 RX ADMIN — SODIUM CHLORIDE SCH MLS/HR: 900 INJECTION INTRAVENOUS at 15:48

## 2021-10-25 RX ADMIN — SODIUM CHLORIDE, SODIUM LACTATE, POTASSIUM CHLORIDE, AND CALCIUM CHLORIDE SCH MLS/HR: 600; 310; 30; 20 INJECTION, SOLUTION INTRAVENOUS at 10:33

## 2021-10-25 RX ADMIN — SODIUM CHLORIDE, SODIUM LACTATE, POTASSIUM CHLORIDE, AND CALCIUM CHLORIDE SCH MLS/HR: 600; 310; 30; 20 INJECTION, SOLUTION INTRAVENOUS at 15:47

## 2021-10-25 RX ADMIN — VASOPRESSIN SCH MLS/HR: 20 INJECTION INTRAVENOUS at 23:43

## 2021-10-25 RX ADMIN — NYSTATIN SCH GM: 100000 CREAM TOPICAL at 19:39

## 2021-10-25 RX ADMIN — SODIUM CHLORIDE, SODIUM LACTATE, POTASSIUM CHLORIDE, AND CALCIUM CHLORIDE SCH MLS/HR: 600; 310; 30; 20 INJECTION, SOLUTION INTRAVENOUS at 10:30

## 2021-10-25 RX ADMIN — ACETAMINOPHEN PRN MG: 325 TABLET ORAL at 23:44

## 2021-10-25 RX ADMIN — SODIUM CHLORIDE SCH MLS/HR: 900 INJECTION INTRAVENOUS at 19:38

## 2021-10-25 NOTE — DIAGNOSTIC IMAGING REPORT
PROCEDURE: CT abdomen and pelvis with contrast.



TECHNIQUE: Multiple contiguous axial images were obtained through

the abdomen and pelvis after administration of intravenous

contrast. Auto Exposure Controls were utilized during the CT exam

to meet ALARA standards for radiation dose reduction. All CT

scans use one or more of the following dose optimizing

techniques: automated exposure control, MA and/or KvP adjustment

based on patient size and exam type or iterative reconstruction.



INDICATION:  Nausea, vomiting, diarrhea and septic shock.



Comparison is made with prior CT from 04/06/2016.



The heart is enlarged. No discrete liver mass is identified.

Gallbladder appears to be unremarkable. No biliary duct

dilatation. The pancreas and spleen are unremarkable. No adrenal

mass is detected. Kidneys are unremarkable. There are extrarenal

pelves bilaterally. No obstructing lesion is identified. Aorta is

tortuous and calcified but nonaneurysmal. No central

retroperitoneal or mesenteric lymphadenopathy is identified.

Bowel loops are normal caliber. Bladder and uterus are

unremarkable. No free fluid or fluid collection is identified.

Mildly prominent lymph nodes in the inguinal regions bilaterally

are noted. No iliac lymphadenopathy is identified.



IMPRESSION:

1. Cardiomegaly.

2. No acute feature in the abdomen or pelvis is identified.



Dictated by: 



  Dictated on workstation # [id number]

## 2021-10-25 NOTE — ED GENERAL
General


Chief Complaint:  Fever-Adult/Adol


Stated Complaint:  FEVER/WEAKNESS


Source of Information:  Patient, EMS


Exam Limitations:  No Limitations





History of Present Illness


Date Seen by Provider:  Oct 25, 2021


Time Seen by Provider:  05:58


Initial Comments


Patient to the ER by [facility] with chief complaint of being woken this 

morning when the need to vomit.  She is very weak unable to stand on her own.  

She lives at home with her adult children who summonsed EMS because she was 

unable to get out of bed.  She had a fever per EMS at 100.  She is known to Dr. Crespo for primary care and Dr. Perez for cardiology.  She denies any pain 

anywhere.  She still having some nausea after one episode of emesis without 

blood in it earlier.  She says the symptoms are started today and she has had no

sick contacts.  She has not had Covid or influenza vaccination.  Patient states 

she has not taken any medicine for her symptoms.  She denies a history of lung 

disease nor dependence on supplemental oxygen.  She denies dysuria or abdominal 

pain.  She denies constipation or diarrhea.





Allergies and Home Medications


Allergies


Coded Allergies:  


     Penicillins (Verified  Allergy, Unknown, 20)





Patient Home Medication List


Home Medication List Reviewed:  Yes


Acetaminophen (Tylenol) 325 Mg Capsule, 325-650 MG PO Q8H PRN for PAIN-MILD (1-

4), (Reported)


   Entered as Reported by: RAMONA OG on 10/25/21 1335


   Last Action: Held


Amlodipine Besylate (Amlodipine Besylate) 5 Mg Tablet, 5 MG PO DAILY, (Reported)


   Entered as Reported by: RAMONA OG on 20 1031


   Last Action: Held


Apixaban (Eliquis) 5 Mg Tablet, 5 MG PO Q12H, (Reported)


   Entered as Reported by: RAMONA OG on 10/25/21 1335


   Last Action: Continued


Diphenhydramine HCl (Benadryl Allergy) 25 Mg Tablet, 25-50 MG PO Q8H PRN for 

ALLERGY SYMPTOMS, (Reported)


   Entered as Reported by: RAMONA OG on 10/25/21 1335


   Last Action: Held


Fluoxetine HCl (Fluoxetine HCl) 20 Mg Capsule, 20 MG PO DAILY, (Reported)


   Entered as Reported by: RAMONA OG on 10/25/21 1335


   Last Action: Held


Meloxicam (Meloxicam) 7.5 Mg Tablet, 7.5 MG PO DAILY, (Reported)


   Entered as Reported by: RAMONA OG on 10/25/21 1335


   Last Action: Held


Spironolactone (Spironolactone) 25 Mg Tablet, 25 MG PO DAILY, (Reported)


   Entered as Reported by: RAMONA OG on 10/25/21 1335


   Last Action: Held


Discontinued Medications


Apixaban (Eliquis) 5 Mg Tablet, 5 MG PO BID


   Discontinued Reason: No Longer Taking


   Prescribed by: STEVE CRESPO on 20 1105


   Last Action: Discontinued


Aripiprazole (Abilify) 15 Mg Tablet, 15 MG PO DAILY


   Discontinued Reason: No Longer Taking


   Prescribed by: STEVE CRESPO on 20 1053


   Last Action: Discontinued


Enalapril Maleate (Enalapril Maleate) 10 Mg Tablet, 10 MG PO DAILY


   Discontinued Reason: No Longer Taking


   Prescribed by: STEVE CRESPO on 20 1053


   Last Action: Discontinued


Fluoxetine HCl (Fluoxetine HCl) 20 Mg Capsule, 20 MG PO DAILY


   Discontinued Reason: Duplicate Order


   Prescribed by: STEVE CRESPO on 20 1053


   Last Action: Discontinued


Hydrocodone/Acetaminophen (Hydrocodone-Acetamin 5-325 mg) 1 Each Tablet, 1 TAB 

PO Q6H


   Discontinued Reason: No Longer Taking


   Prescribed by: STEVE CRESPO on 20 1053


   Last Action: Discontinued


Metoprolol Succinate (Metoprolol Succinate) 25 Mg Tab.er.24h, 25 MG PO DAILY, 

(Reported)


   Discontinued Reason: No Longer Taking


   Entered as Reported by: RAMONA OG on 20 1031


   Last Action: Discontinued


[Platform Walker]  , (DME)


   Discontinued Reason: No Longer Taking


   Prescribed by: CHARLOTTE TY on 20 1352


   Last Action: Discontinued


[Platform Walker]  , (DME)


   Discontinued Reason: No Longer Taking


   Prescribed by: CHARLOTTE TY on 20 1354


   Last Action: Discontinued





Review of Systems


Review of Systems


Constitutional:  No chills, No diaphoresis, No fever, No malaise


EENTM:  No ear discharge, No ear pain


Respiratory:  No cough, No short of breath


Cardiovascular:  No chest pain, No edema, No palpitations


Gastrointestinal:  No abdominal pain, No constipation, No diarrhea; nausea, 

vomiting


Genitourinary:  No discharge, No dysuria


Musculoskeletal:  No back pain, No joint pain





All Other Systems Reviewed


Negative Unless Noted:  Yes





Past Medical-Social-Family Hx


Patient Social History


Tobacco Use?:  No


Use of E-Cig and/or Vaping dev:  No


Substance use?:  No





Seasonal Allergies


Seasonal Allergies:  No





Past Medical History


Surgeries:  Yes


Respiratory:  Yes (USES INHALER, SOB)


Currently Using CPAP:  No


Currently Using BIPAP:  No


Cardiac:  Yes


Chronic Edema/Swelling, Heart Murmur, Hypertension


Neurological:  No


Reproductive Disorders:  No


Female Reproductive Disorders:  Denies


GYN History:  Menopausal


Sexually Transmitted Disease:  No


HIV/AIDS:  No


Genitourinary:  No


Gastrointestinal:  No


Musculoskeletal:  Yes (ARTHRITIS IN BACK )


Arthritis, Chronic Back Pain


Endocrine:  Yes


Diabetes, Non-Insulin dep


HEENT:  Yes (POOR DENTITION)


Cancer:  No


Psychosocial:  Yes


Anxiety, Depression


Integumentary:  No


Blood Disorders:  No


Adverse Reaction/Blood Tranf:  No





Family Medical History


Heart Disease, Diabetes





Physical Exam-Suspected Sepsis


Physical Exam


Vital Signs





Vital Signs - First Documented








 10/25/21 10/25/21





 06:15 08:12


 


Temp 38.4 


 


Pulse 116 


 


Resp  18


 


B/P (MAP) 93/61 (72) 


 


Pulse Ox 95 


 


O2 Delivery Nasal Cannula 


 


O2 Flow Rate 2.00 





Capillary Refill :


Height, Weight, BMI


Height: 0'0.00"


Weight: 0lbs. 0.0oz. 0.043460ga; 24.48 BMI


Method:


General Appearance:  Moderate Distress, Other (Dried emesis on her right 

shoulder)


Eyes:  Bilateral Eye Normal Inspection, Bilateral Eye PERRL, Bilateral Eye EOMI


HEENT:  PERRL/EOMI, TMs Normal, Normal ENT Inspection; No Moist Mucous Membranes


Neck:  Full Range of Motion, Normal Inspection, Non Tender, Supple


Respiratory:  Lungs Clear, Normal Breath Sounds, No Accessory Muscle Use, 

Respiratory Distress (Mild to moderate with oxygen saturations 92% on room air.)


Cardiovascular:  Regular Rate, Rhythm, Normal Peripheral Pulses, Tachycardia


Gastrointestinal:  Normal Bowel Sounds, Non Tender, Soft


Extremity:  Normal Capillary Refill, Normal Inspection, Normal Range of Motion, 

Non Tender, No Calf Tenderness, No Pedal Edema


Neurologic/Psychiatric:  Alert, Oriented x3, Normal Mood/Affect


Skin:  normal color, warm/dry





Focused Exam


Sepsis Stage:  Septic Shock


Lactate Level


10/25/21 06:15: Lactic Acid Level 5.09*H


10/25/21 08:41: Lactic Acid Level 1.45





Time of Focused Exam:  07:26


Lactic Acid Level








Progress/Results/Core Measures


Suspected Sepsis


SIRS


Temperature: 


Pulse:  


Respiratory Rate: 


 


Laboratory Tests


10/25/21 06:05: White Blood Count 13.3H


Blood Pressure  / 


Mean: 


 





10/25/21 06:15: Lactic Acid Level 5.09*H


10/25/21 08:41: Lactic Acid Level 1.45


Laboratory Tests


10/25/21 06:05: 


Creatinine 0.79, INR Comment 1.3, Platelet Count 241, Total Bilirubin 1.3H








Results/Orders


Lab Results





Laboratory Tests








Test


 10/25/21


06:05 10/25/21


06:15 10/25/21


06:18 10/25/21


06:36 Range/Units


 


 


White Blood Count


 13.3 H


 


 


 


 4.3-11.0


10^3/uL


 


Red Blood Count


 4.38 


 


 


 


 3.80-5.11


10^6/uL


 


Hemoglobin 12.8     11.5-16.0  g/dL


 


Hematocrit 38     35-52  %


 


Mean Corpuscular Volume 86     80-99  fL


 


Mean Corpuscular Hemoglobin 29     25-34  pg


 


Mean Corpuscular Hemoglobin


Concent 34 


 


 


 


 32-36  g/dL





 


Red Cell Distribution Width 13.9     10.0-14.5  %


 


Platelet Count


 241 


 


 


 


 130-400


10^3/uL


 


Mean Platelet Volume 9.3     9.0-12.2  fL


 


Immature Granulocyte % (Auto) 1      %


 


Neutrophils (%) (Auto) 95 H    42-75  %


 


Lymphocytes (%) (Auto) 2 L    12-44  %


 


Monocytes (%) (Auto) 2     0-12  %


 


Eosinophils (%) (Auto) 0     0-10  %


 


Basophils (%) (Auto) 0     0-10  %


 


Neutrophils # (Auto)


 12.6 H


 


 


 


 1.8-7.8


10^3/uL


 


Lymphocytes # (Auto)


 0.3 L


 


 


 


 1.0-4.0


10^3/uL


 


Monocytes # (Auto)


 0.3 


 


 


 


 0.0-1.0


10^3/uL


 


Eosinophils # (Auto)


 0.0 


 


 


 


 0.0-0.3


10^3/uL


 


Basophils # (Auto)


 0.0 


 


 


 


 0.0-0.1


10^3/uL


 


Immature Granulocyte # (Auto)


 0.1 


 


 


 


 0.0-0.1


10^3/uL


 


Neutrophils % (Manual) 76      %


 


Lymphocytes % (Manual) 3      %


 


Monocytes % (Manual) 3      %


 


Band Neutrophils 18      %


 


Toxic Granulation 1+      


 


Prothrombin Time 16.2 H    12.2-14.7  SEC


 


INR Comment 1.3     0.8-1.4  


 


Activated Partial


Thromboplast Time 33 


 


 


 


 24-35  SEC





 


Sodium Level 126 L    135-145  MMOL/L


 


Potassium Level 3.6     3.6-5.0  MMOL/L


 


Chloride Level 93 L      MMOL/L


 


Carbon Dioxide Level 15 L    21-32  MMOL/L


 


Anion Gap 18 H    5-14  MMOL/L


 


Blood Urea Nitrogen 7     7-18  MG/DL


 


Creatinine


 0.79 


 


 


 


 0.60-1.30


MG/DL


 


Estimat Glomerular Filtration


Rate 71 


 


 


 


  





 


BUN/Creatinine Ratio 9      


 


Glucose Level 119 H      MG/DL


 


Calcium Level 8.5     8.5-10.1  MG/DL


 


Corrected Calcium 9.1     8.5-10.1  MG/DL


 


Total Bilirubin 1.3 H    0.1-1.0  MG/DL


 


Aspartate Amino Transf


(AST/SGOT) 22 


 


 


 


 5-34  U/L





 


Alanine Aminotransferase


(ALT/SGPT) 10 


 


 


 


 0-55  U/L





 


Alkaline Phosphatase 76       U/L


 


C-Reactive Protein High


Sensitivity 3.23 H


 


 


 


 0.00-0.50


MG/DL


 


Total Protein 6.4     6.4-8.2  GM/DL


 


Albumin 3.3     3.2-4.5  GM/DL


 


Lipase 91 H    8-78  U/L


 


Procalcitonin 1.26 H    <0.10  NG/ML


 


Lactic Acid Level


 


 5.09 *H


 


 


 0.50-2.00


MMOL/L


 


Urine Color   YELLOW    


 


Urine Clarity   CLEAR    


 


Urine pH   6.0   5-9  


 


Urine Specific Gravity   1.015 L  1.016-1.022  


 


Urine Protein   NEGATIVE   NEGATIVE  


 


Urine Glucose (UA)   NEGATIVE   NEGATIVE  


 


Urine Ketones   TRACE H  NEGATIVE  


 


Urine Nitrite   NEGATIVE   NEGATIVE  


 


Urine Bilirubin   NEGATIVE   NEGATIVE  


 


Urine Urobilinogen   0.2   < = 1.0  MG/DL


 


Urine Leukocyte Esterase   TRACE H  NEGATIVE  


 


Urine RBC (Auto)   1+ H  NEGATIVE  


 


Urine RBC   2-5 H   /HPF


 


Urine WBC   0-2    /HPF


 


Urine Squamous Epithelial


Cells 


 


 0-2 


 


  /HPF





 


Urine Crystals   NONE    /LPF


 


Urine Bacteria   TRACE    /HPF


 


Urine Casts   PRESENT    /LPF


 


Urine Hyaline Casts   RARE    /LPF


 


Urine Mucus   NEGATIVE    /LPF


 


Urine Culture Indicated   NO    


 


Influenza Type A (RT-PCR)   Not Detected   Not Detecte  


 


Influenza Type B (RT-PCR)   Not Detected   Not Detecte  


 


SARS-CoV-2 RNA (RT-PCR)   Not Detected   Not Detecte  


 


Blood Gas Puncture Site    LEFT RADIAL   


 


Blood Gas Patient Temperature    38.4   


 


Arterial Blood pH    7.41  7.37-7.43  


 


Arterial Blood Partial


Pressure CO2 


 


 


 31 L


 35-45  MMHG





 


Arterial Blood Partial


Pressure O2 


 


 


 94 H


 79-93  MMHG





 


Arterial Blood HCO3    19 L 23-27  MMOL/L


 


Arterial Blood Total CO2


 


 


 


 19.7 L


 21.0-31.0


MMOL/L


 


Arterial Blood Oxygen


Saturation 


 


 


 98 


   %





 


Arterial Blood Base Excess


 


 


 


 -4.7 L


 -2.5-2.5


MMOL/L


 


Andrews Test    YES-POS   


 


Blood Gas Ventilator Setting    NO   


 


Blood Gas Inspired Oxygen    2   


 


Test


 10/25/21


08:41 


 


 


 Range/Units


 


 


Lactic Acid Level


 1.45 


 


 


 


 0.50-2.00


MMOL/L








My Orders





Orders - LUDY ARRIOLA


Cbc With Automated Diff (10/25/21 06:12)


Comprehensive Metabolic Panel (10/25/21 06:12)


Blood Culture (10/25/21 06:12)


Sputum Culture (10/25/21 06:12)


Urinalysis (10/25/21 06:12)


Urine Culture (10/25/21 06:12)


Protime With Inr (10/25/21 06:12)


Partial Thromboplastin Time (10/25/21 06:12)


Chest 1 View, Ap/Pa Only (10/25/21 06:12)


Acetaminophen  Tablet (Tylenol  Tablet) (10/25/21 06:15)


Ed Iv/Invasive Line Start (10/25/21 06:12)


Ed Iv/Invasive Line Start (10/25/21 06:12)


Vital Signs Adult Sepsis Patie Q15M (10/25/21 06:12)


Ondansetron Injection (Zofran Injectio (10/25/21 06:15)


O2 (10/25/21 06:12)


Remove Rings In Anticipation O (10/25/21 06:12)


Lactic Acid Analyzer (10/25/21 06:12)


Influenza A And B By Pcr (10/25/21 06:12)


Ns Iv 1000 Ml (Sodium Chloride 0.9%) (10/25/21 06:15)


Ceftriaxone (Rocephin) (10/25/21 06:15)


Azithromycin Injection (Zithromax Inject (10/25/21 06:15)


Ed Iv/Invasive Line Start (10/25/21 06:12)


Lactated Ringers (Lr 1000 Ml Iv Solution (10/25/21 06:15)


Covid 19 Inhouse Test (10/25/21 06:12)


Hs C Reactive Protein (10/25/21 06:16)


Procalcitonin (Pct) (10/25/21 06:16)


Ed Iv/Invasive Line Start (10/25/21 06:20)


Ns Iv 1000 Ml (Sodium Chloride 0.9%) (10/25/21 06:30)


Manual Differential (10/25/21 06:05)


Ed Iv/Invasive Line Start (10/25/21 06:27)


Ns Iv 1000 Ml (Sodium Chloride 0.9%) (10/25/21 06:30)


Arterial Blood Gas (10/25/21 06:24)


Arterial Blood Draw (10/25/21 06:36)


Ct Abdomen/Pelvis W (10/25/21 07:27)


Iohexol Injection (Omnipaque 350 Mg/Ml 1 (10/25/21 07:45)


Received Contrast (Hold Metformin- Contr (10/25/21 07:45)


Ns (Ivpb) (Sodium Chloride 0.9% Ivpb Bag (10/25/21 07:45)


Lipase (10/25/21 08:51)


Lactated Ringers (Lr 1000 Ml Iv Solution (10/25/21 10:30)


Lactated Ringers (Lr 1000 Ml Iv Solution (10/25/21 10:21)





Medications Given in ED





Current Medications








 Medications  Dose


 Ordered  Sig/Stanislav


 Route  Start Time


 Stop Time Status Last Admin


Dose Admin


 


 Acetaminophen  1,000 mg  ONCE  PRN


 PO  10/25/21 06:15


 10/25/21 06:47 DC 10/25/21 06:42


1,000 MG


 


 Azithromycin 500


 mg/Sodium Chloride  255 ml @ 


 250 mls/hr  ONCE  ONCE


 IV  10/25/21 06:15


 10/25/21 07:16 DC 10/25/21 06:46


250 MLS/HR


 


 Ceftriaxone


 Sodium 1000 mg/


 Sterile Water  10 ml @ 


 200 mls/hr  ONCE  ONCE


 IV  10/25/21 06:15


 10/25/21 06:17 DC 10/25/21 06:43


200 MLS/HR


 


 Iohexol  100 ml  ONCE  ONCE


 IV  10/25/21 07:45


 10/25/21 07:46 DC 10/25/21 08:10


100 ML


 


 Ondansetron HCl  4 mg  PRN  PRN


 IV  10/25/21 06:15


 10/25/21 06:47 DC 10/25/21 06:42


4 MG


 


 Sodium Chloride  100 ml  ONCE  ONCE


 IV  10/25/21 07:45


 10/25/21 07:46 DC 10/25/21 08:10


100 ML








Vital Signs/I&O











 10/25/21 10/25/21 10/25/21 10/25/21





 06:15 06:42 08:12 08:25


 


Temp 38.4 38.4  36.7


 


Pulse 116  101 89


 


Resp   18 18


 


B/P (MAP) 93/61 (72)  103/70 103/64


 


Pulse Ox 95  95 97


 


O2 Delivery Nasal Cannula  Nasal Cannula Nasal Cannula


 


O2 Flow Rate 2.00  2.00 2.00


 


    





 10/25/21 10/25/21 10/25/21 10/25/21





 09:29 11:00 11:35 11:50


 


Temp    37.1


 


Pulse 84  76 


 


Resp 18  18 


 


B/P (MAP) 108/85 103/69 (80) 99/58 


 


Pulse Ox 96  97 


 


O2 Delivery Nasal Cannula  Nasal Cannula 


 


O2 Flow Rate 2.00  2.00 


 


    





 10/25/21 10/25/21 10/25/21 10/25/21





 11:50 12:00 13:00 13:00


 


Pulse  73 73 


 


Resp    21


 


B/P (MAP)  117/73 (88)  92/62 (72)


 


Pulse Ox  96  99


 


O2 Delivery Nasal Cannula Nasal Cannula  Nasal Cannula


 


O2 Flow Rate 2.00 2.00  2.00





 10/25/21 10/25/21 10/25/21 10/25/21





 14:00 15:00 16:00 16:00


 


Temp   36.3 


 


Pulse 67 64  70


 


Resp 14 21  21


 


B/P (MAP) 102/75 (84) 85/58 (67)  123/115 (118)


 


Pulse Ox 99 99  99


 


O2 Delivery Nasal Cannula Nasal Cannula  Nasal Cannula


 


O2 Flow Rate 2.00 2.00  2.00


 


    





 10/25/21   





 17:00   


 


Pulse 88   


 


Resp 25   


 


B/P (MAP) 107/66 (80)   


 


Pulse Ox 99   


 


O2 Delivery Nasal Cannula   


 


O2 Flow Rate 2.00   





Capillary Refill :


Progress Note #1:  


   Time:  06:23


Progress Note


Patient appears to be septic.  We will give her 3 L of normal saline which will 

be approximately 30 mL/kg.  She has a soft blood pressure 93/61.  We put her on 

2 L by nasal cannula which brought her oxygenation up in the mid nineties.  An 

ABG has been ordered.  Appropriate labs, chest x-ray and coverage with Rocephin 

and azithromycin which would cover her for both UTI or pneumonia.  Covid 

influenza swab.


Progress Note #2:  


   Time:  07:28


Progress Note


Patient's oxygenation is stable on 2 L with oxygen saturation 96%.  Blood 

pressure is significant improved and she is still getting her fluid bolus.  

112/63 was her last blood pressure.  Suspect she has a pneumonia however chest 

x-ray does not reveal 1.  With her vomiting and diarrhea were going to go ahead 

and scan her abdomen.  She has a mild increase in her bilirubin.





Diagnostic Imaging





   Diagonstic Imaging:  Xray


   Plain Films/CT/US/NM/MRI:  chest


Comments


NAME:   BELLA HAMILTON


[facility] REC#:   [id number]


ACCOUNT#:   [id number]


PT STATUS:   REG ER


:   1950


PHYSICIAN:   LUDY ARRIOLA MD


ADMIT DATE:   10/25/21/ER


                                   ***Draft***


Date of Exam:10/25/21





CHEST 1 VIEW, AP/PA ONLY








EXAMINATION: Chest 1 view





HISTORY: sepsis





COMPARISON: 04/10/2020





FINDINGS: 





There is moderate enlargement of the heart, unchanged. No pleural


effusion or pneumothorax. No edema or pneumonia.





IMPRESSION: 





1. Moderate enlargement of the heart without edema or pneumonia.





  Dictated on workstation # [id number]








Dict:   10/25/21 0718


Trans:   10/25/21 0720


[facility] [id number]





Interpreted by:     SEB CLIFTON MD


Electronically signed by:


   Reviewed:  Reviewed by Me








   Diagonstic Imaging:  CT


   Plain Films/CT/US/NM/MRI:  abdomen, pelvis


Comments


No acute findings in the abdomen pelvis.


                 [facility].


                                [place], [place]





NAME:   BELLA HAMILTON


[facility] REC#:   [id number]


ACCOUNT#:   [id number]


PT STATUS:   ADM IN


:   1950


PHYSICIAN:   LUDY ARRIOLA MD


ADMIT DATE:   10/25/21/ICU


                                  ***Signed***


Date of Exam:10/25/21





CT ABDOMEN/PELVIS W








PROCEDURE: CT abdomen and pelvis with contrast.





TECHNIQUE: Multiple contiguous axial images were obtained through


the abdomen and pelvis after administration of intravenous


contrast. Auto Exposure Controls were utilized during the CT exam


to meet ALARA standards for radiation dose reduction. All CT


scans use one or more of the following dose optimizing


techniques: automated exposure control, MA and/or KvP adjustment


based on patient size and exam type or iterative reconstruction.





INDICATION:  Nausea, vomiting, diarrhea and septic shock.





Comparison is made with prior CT from 2016.





The heart is enlarged. No discrete liver mass is identified.


Gallbladder appears to be unremarkable. No biliary duct


dilatation. The pancreas and spleen are unremarkable. No adrenal


mass is detected. Kidneys are unremarkable. There are extrarenal


pelves bilaterally. No obstructing lesion is identified. Aorta is


tortuous and calcified but nonaneurysmal. No central


retroperitoneal or mesenteric lymphadenopathy is identified.


Bowel loops are normal caliber. Bladder and uterus are


unremarkable. No free fluid or fluid collection is identified.


Mildly prominent lymph nodes in the inguinal regions bilaterally


are noted. No iliac lymphadenopathy is identified.





IMPRESSION:


1. Cardiomegaly.


2. No acute feature in the abdomen or pelvis is identified.





Dictated by: 





  Dictated on workstation # [id number]








Dict:   10/25/21 08


Trans:   10/25/21 1556


[facility] [id number]





Interpreted by:     MONSERRAT HAYS MD


Electronically signed by: MONSERRAT HAYS MD 10/25/21 1556


   Reviewed:  Reviewed by Me, Discussed w/Radiologist





Departure


Communication (Admissions)


Time/Spoke to Admitting Phy:  10:00


Discussed case with Dr. Crespo and she is in agreement with consultation to 

general surgery and ultrasound of the gallbladder now.


Time/Spoke to Consulting Phy:  10:45


Discussed the case with Dr. Rowan, general surgery and he agrees to consult on 

the case.





Impression





   Primary Impression:  


   Septic shock


   Additional Impressions:  


   Pneumonia


   Qualified Codes:  J18.9 - Pneumonia, unspecified organism


   Acute respiratory failure with hypoxemia


   Hyponatremia


Disposition:   ADMITTED AS INPATIENT


Condition:  Stable





Admissions


Decision to Admit Reason:  Admit from ER (General)


Decision to Admit/Date:  Oct 25, 2021


Time/Decision to Admit Time:  07:26





Departure-Patient Inst.


Referrals:  


AMERICA CAMP MD (PCP)


Primary Care Physician











LUDY ARRIOLA                 Oct 25, 2021 06:20

## 2021-10-25 NOTE — CONSULTATION - SURGERY
DIOMEDESJAIMEANGIELILIANA 10/25/21 1216:


History of Present Illness


History of Present Illness


Patient Consulted On(rodolfo/time)


10/25/21


 12:14


Date Seen by Provider:  Oct 25, 2021


Time Seen by Provider:  12:30


History of Present Illness


PT is an 70 YO female in the hospital for suspected sepsis. 


PT reports that last night she could not move her legs and could not feel her 

legs. 


She also had an episode of vomiting. She did not see the color of her vomit. 


Her daughter reports she also had a fever. PT denies abdominal pain. 


She presented to the ER where sepsis was suspected.





Allergies and Home Medications


Allergies


Coded Allergies:  


     Penicillins (Verified  Allergy, Unknown, 4/11/20)





Patient Home Medication List


Acetaminophen (Tylenol) 325 Mg Capsule, 325-650 MG PO Q8H PRN for PAIN-MILD (1-

4), (Reported)


   Entered as Reported by: RAMONA OG on 10/25/21 1335


   Last Action: Reviewed


Amlodipine Besylate (Amlodipine Besylate) 5 Mg Tablet, 5 MG PO DAILY, (Reported)


   Entered as Reported by: RAMONA OG on 4/13/20 1031


   Last Action: Reviewed


Apixaban (Eliquis) 5 Mg Tablet, 5 MG PO Q12H, (Reported)


   Entered as Reported by: RAMONA OG on 10/25/21 1335


   Last Action: Reviewed


Diphenhydramine HCl (Benadryl Allergy) 25 Mg Tablet, 25-50 MG PO Q8H PRN for 

ALLERGY SYMPTOMS, (Reported)


   Entered as Reported by: RAMONA OG on 10/25/21 1335


   Last Action: Reviewed


Fluoxetine HCl (Fluoxetine HCl) 20 Mg Capsule, 20 MG PO DAILY, (Reported)


   Entered as Reported by: RAMONA OG on 10/25/21 1335


   Last Action: Reviewed


Meloxicam (Meloxicam) 7.5 Mg Tablet, 7.5 MG PO DAILY, (Reported)


   Entered as Reported by: RAMNOA OG on 10/25/21 1335


   Last Action: Reviewed


Spironolactone (Spironolactone) 25 Mg Tablet, 25 MG PO DAILY, (Reported)


   Entered as Reported by: RAMONA OG on 10/25/21 1335


   Last Action: Reviewed


Discontinued Medications


Apixaban (Eliquis) 5 Mg Tablet, 5 MG PO BID


   Discontinued Reason: No Longer Taking


   Prescribed by: STEVE CRESPO on 4/13/20 1105


   Last Action: Discontinued


Aripiprazole (Abilify) 15 Mg Tablet, 15 MG PO DAILY


   Discontinued Reason: No Longer Taking


   Prescribed by: STEVE CRESPO on 4/13/20 1053


   Last Action: Discontinued


Enalapril Maleate (Enalapril Maleate) 10 Mg Tablet, 10 MG PO DAILY


   Discontinued Reason: No Longer Taking


   Prescribed by: STEVE CRESPO on 4/13/20 1053


   Last Action: Discontinued


Fluoxetine HCl (Fluoxetine HCl) 20 Mg Capsule, 20 MG PO DAILY


   Discontinued Reason: Duplicate Order


   Prescribed by: STEVE CRESPO on 4/13/20 1053


   Last Action: Discontinued


Hydrocodone/Acetaminophen (Hydrocodone-Acetamin 5-325 mg) 1 Each Tablet, 1 TAB 

PO Q6H


   Discontinued Reason: No Longer Taking


   Prescribed by: STEVE CRESPO on 4/13/20 1053


   Last Action: Discontinued


Metoprolol Succinate (Metoprolol Succinate) 25 Mg Tab.er.24h, 25 MG PO DAILY, 

(Reported)


   Discontinued Reason: No Longer Taking


   Entered as Reported by: RAMONA OG on 4/13/20 1031


   Last Action: Discontinued


[Platform Walker]  , (DME)


   Discontinued Reason: No Longer Taking


   Prescribed by: CHARLOTTE TY on 4/13/20 1352


   Last Action: Discontinued


[Platform Walker]  , (DME)


   Discontinued Reason: No Longer Taking


   Prescribed by: CHARLOTTE TY on 4/13/20 1354


   Last Action: Discontinued





Past Medical-Social-Family Hx


Patient Social History


Smoking Status:  Current Everyday Smoker


Former Smoker, Quit:  Jan 1, 2006


Type Used:  Smokeless Tobacco


2nd Hand Smoke Exposure:  Yes


Recent Hopitalizations:  No


Alcohol Use?:  Yes





Immunizations Up To Date


Date of Pneumonia Vaccine:  Apr 1, 2018


Date of Influenza Vaccine:  Nov 1, 2019





Seasonal Allergies


Seasonal Allergies:  No





Surgeries


History of Surgeries:  Yes





Respiratory


History of Respiratory Disorde:  Yes (USES INHALER, SOB)





Cardiovascular


History of Cardiac Disorders:  Yes


Cardiac Disorders:  Chronic Edema/Swelling, Heart Murmur, Hypertension





Neurological


History of Neurological Disord:  No





Reproductive System


Hx Reproductive Disorders:  No


Sexually Transmitted Disease:  No


HIV/AIDS:  No


Female Reproductive Disorders:  Denies


GYN History:  Menopausal





Genitourinary


History of Genitourinary Disor:  No





Gastrointestinal


History of Gastrointestinal Di:  No





Musculoskeletal


History of Musculoskeletal Dis:  Yes (ARTHRITIS IN BACK )


Musculoskeletal Disorders:  Arthritis, Chronic Back Pain





Endocrine


History of Endocrine Disorders:  Yes


Endocrine Disorders:  Diabetes, Non-Insulin dep





HEENT


History of HEENT Disorders:  Yes (POOR DENTITION)





Cancer


History of Cancer:  No





Psychosocial


History of Psychiatric Problem:  Yes


Behavioral Health Disorders:  Anxiety, Depression





Integumentary


History of Skin or Integumenta:  No





Blood Transfusions


History of Blood Disorders:  No


Adverse Reaction to a Blood Tr:  No





Family Medical History


Significant Family History:  Heart Disease, Diabetes (brother and sister ), 

Stroke (mother), Other Conditions/Hx (liver failure in 2 of her brothers/. )


Other


Father: no HTN no DM





Review of Systems-General


Constitutional:  No chills; other (no night sweats )


EENTM:  No blurred vision, No double vision


Respiratory:  No cough, No short of breath


Cardiovascular:  No chest pain, No palpitations


Gastrointestinal:  No nausea; vomiting (one episode last night, none currently )


Genitourinary:  No dysuria, No hematuria


Musculoskeletal:  No neck pain; other (no arm pain )


Skin:  change in color, dryness, lesions (blister on right foot )


Psychiatric/Neurological:  Denies Anxiety, Denies Depressed, Denies Emotional 

Problems; Headache (been having headache for 1 week)





Physical Exam-General Problems


Physical Exam


Vital Signs





Vital Signs - First Documented








 10/25/21 10/25/21





 06:15 08:12


 


Temp 38.4 


 


Pulse 116 


 


Resp  18


 


B/P (MAP) 93/61 (72) 


 


Pulse Ox 95 


 


O2 Delivery Nasal Cannula 


 


O2 Flow Rate 2.00 





Capillary Refill : Less Than 3 Seconds


General Appearance:  no apparent distress


Eyes:  Bilateral Eye PERRL, Bilateral Eye EOMI


HEENT:  pharynx normal; No scleral icterus (R), No scleral icterus (L); other 

(poor dentition )


Neck:  non-tender, full range of motion, supple


Respiratory:  chest non-tender, lungs clear, normal breath sounds, no 

respiratory distress, no accessory muscle use


Cardiovascular:  normal peripheral pulses, regular rate, rhythm, no edema


Peripheral Pulses:  2+ Radial Pulses (R), 2+ Radial Pulses (L)


Gastrointestinal:  non tender, soft, no organomegaly, no pulsatile mass, tendern

ess (mild epigastric tenderness)


Rectal:  deferred


Extremities:  normal range of motion, non-tender, no calf tenderness


Neurologic/Psychiatric:  no motor/sensory deficits, alert, normal mood/affect, 

oriented x 3


Skin:  warm/dry, other (blister on right foot), rash (venous stasis dermatitis 

of lower extremity )


Lymphatic:  no adenopathy (cervical)





Data Review


Labs


Laboratory Tests


10/25/21 06:05: 


White Blood Count 13.3H, Red Blood Count 4.38, Hemoglobin 12.8, Hematocrit 38, 

Mean Corpuscular Volume 86, Mean Corpuscular Hemoglobin 29, Mean Corpuscular 

Hemoglobin Concent 34, Red Cell Distribution Width 13.9, Platelet Count 241, 

Mean Platelet Volume 9.3, Immature Granulocyte % (Auto) 1, Neutrophils (%) 

(Auto) 95H, Lymphocytes (%) (Auto) 2L, Monocytes (%) (Auto) 2, Eosinophils (%) 

(Auto) 0, Basophils (%) (Auto) 0, Neutrophils # (Auto) 12.6H, Lymphocytes # 

(Auto) 0.3L, Monocytes # (Auto) 0.3, Eosinophils # (Auto) 0.0, Basophils # 

(Auto) 0.0, Immature Granulocyte # (Auto) 0.1, Neutrophils % (Manual) 76, 

Lymphocytes % (Manual) 3, Monocytes % (Manual) 3, Band Neutrophils 18, Toxic 

Granulation 1+, Prothrombin Time 16.2H, INR Comment 1.3, Activated Partial 

Thromboplast Time 33, Sodium Level 126L, Potassium Level 3.6, Chloride Level 93L

, Carbon Dioxide Level 15L, Anion Gap 18H, Blood Urea Nitrogen 7, Creatinine 

0.79, Estimat Glomerular Filtration Rate 71, BUN/Creatinine Ratio 9, Glucose 

Level 119H, Calcium Level 8.5, Corrected Calcium 9.1, Total Bilirubin 1.3H, 

Aspartate Amino Transf (AST/SGOT) 22, Alanine Aminotransferase (ALT/SGPT) 10, 

Alkaline Phosphatase 76, C-Reactive Protein High Sensitivity 3.23H, Total 

Protein 6.4, Albumin 3.3, Lipase 91H, Procalcitonin 1.26H


10/25/21 06:15: Lactic Acid Level 5.09*H


10/25/21 06:18: 


Urine Color YELLOW, Urine Clarity CLEAR, Urine pH 6.0, Urine Specific Gravity 

1.015L, Urine Protein NEGATIVE, Urine Glucose (UA) NEGATIVE, Urine Ketones 

TRACEH, Urine Nitrite NEGATIVE, Urine Bilirubin NEGATIVE, Urine Urobilinogen 

0.2, Urine Leukocyte Esterase TRACEH, Urine RBC (Auto) 1+H, Urine RBC 2-5H, 

Urine WBC 0-2, Urine Squamous Epithelial Cells 0-2, Urine Crystals NONE, Urine 

Bacteria TRACE, Urine Casts PRESENT, Urine Hyaline Casts RARE, Urine Mucus 

NEGATIVE, Urine Culture Indicated NO, Influenza Type A (RT-PCR) Not Detected, 

Influenza Type B (RT-PCR) Not Detected, SARS-CoV-2 RNA (RT-PCR) Not Detected


10/25/21 06:36: 


Blood Gas Puncture Site LEFT RADIAL, Blood Gas Patient Temperature 38.4, 

Arterial Blood pH 7.41, Arterial Blood Partial Pressure CO2 31L, Arterial Blood 

Partial Pressure O2 94H, Arterial Blood HCO3 19L, Arterial Blood Total CO2 19.7L

, Arterial Blood Oxygen Saturation 98, Arterial Blood Base Excess -4.7L, Andrews 

Test YES-POS, Blood Gas Ventilator Setting NO, Blood Gas Inspired Oxygen 2


10/25/21 08:41: Lactic Acid Level 1.45





Assessment/Plan


Suspected sepsis 





Continue to follow patients vital signs under the direction of medicine for her 

sepsis.  


All questions answered at this time.





NICKY GLASS DO 10/25/21 1318:


History of Present Illness


History of Present Illness


Time Seen by Provider:  13:59


History of Present Illness


Surgery asked to consult regarding Sepsis; r/o abdominal cause.





HPI per ED:  Patient to the ER by [facility] with chief complaint of being 

woken this morning when the need to vomit.  She is very weak unable to stand on 

her own.  She lives at home with her adult children who summonsed EMS because 

she was unable to get out of bed.  She had a fever per EMS at 100.  She is known

to Dr. Crespo for primary care and Dr. Perez for cardiology.  She denies any pain 

anywhere.  She still having some nausea after one episode of emesis without 

blood in it earlier.  She says the symptoms are started today and she has had no

sick contacts.  She has not had Covid or influenza vaccination.  Patient states 

she has not taken any medicine for her symptoms.  She denies a history of lung 

disease nor dependence on supplemental oxygen.  She denies dysuria or abdominal 

pain.  She denies constipation or diarrhea.





When I spoke to pt this afternoon, she denied any pain or problems; "just feel 

tired".  Her family member is at bedside; stated she woke up at around 3am felt 

weak and "sick" with temp of 102. Temp came down to 100.8 after tylenol.  She 

denied any abdominal pain, did have some nausea and  emesis.





Allergies and Home Medications


Allergies


Coded Allergies:  


     Penicillins (Verified  Allergy, Unknown, 4/11/20)





Patient Home Medication List


Home Medication List Reviewed:  Yes


Acetaminophen (Tylenol) 325 Mg Capsule, 325-650 MG PO Q8H PRN for PAIN-MILD (1-

4), (Reported)


   Entered as Reported by: RAMONA OG on 10/25/21 1335


   Last Action: Reviewed


Amlodipine Besylate (Amlodipine Besylate) 5 Mg Tablet, 5 MG PO DAILY, (Reported)


   Entered as Reported by: RAMONA OG on 4/13/20 1031


   Last Action: Reviewed


Apixaban (Eliquis) 5 Mg Tablet, 5 MG PO Q12H, (Reported)


   Entered as Reported by: RAMONA OG on 10/25/21 1335


   Last Action: Reviewed


Diphenhydramine HCl (Benadryl Allergy) 25 Mg Tablet, 25-50 MG PO Q8H PRN for 

ALLERGY SYMPTOMS, (Reported)


   Entered as Reported by: RAMONA OG on 10/25/21 1335


   Last Action: Reviewed


Fluoxetine HCl (Fluoxetine HCl) 20 Mg Capsule, 20 MG PO DAILY, (Reported)


   Entered as Reported by: RAMONA OG on 10/25/21 1335


   Last Action: Reviewed


Meloxicam (Meloxicam) 7.5 Mg Tablet, 7.5 MG PO DAILY, (Reported)


   Entered as Reported by: RAMONA OG on 10/25/21 1335


   Last Action: Reviewed


Spironolactone (Spironolactone) 25 Mg Tablet, 25 MG PO DAILY, (Reported)


   Entered as Reported by: RAMONA OG on 10/25/21 1335


   Last Action: Reviewed


Discontinued Medications


Apixaban (Eliquis) 5 Mg Tablet, 5 MG PO BID


   Discontinued Reason: No Longer Taking


   Prescribed by: STEVE CRESPO on 4/13/20 1105


   Last Action: Discontinued


Aripiprazole (Abilify) 15 Mg Tablet, 15 MG PO DAILY


   Discontinued Reason: No Longer Taking


   Prescribed by: STEVE CRESPO on 4/13/20 1053


   Last Action: Discontinued


Enalapril Maleate (Enalapril Maleate) 10 Mg Tablet, 10 MG PO DAILY


   Discontinued Reason: No Longer Taking


   Prescribed by: STEVE CRESPO on 4/13/20 1053


   Last Action: Discontinued


Fluoxetine HCl (Fluoxetine HCl) 20 Mg Capsule, 20 MG PO DAILY


   Discontinued Reason: Duplicate Order


   Prescribed by: STEVE CRESPO on 4/13/20 1053


   Last Action: Discontinued


Hydrocodone/Acetaminophen (Hydrocodone-Acetamin 5-325 mg) 1 Each Tablet, 1 TAB 

PO Q6H


   Discontinued Reason: No Longer Taking


   Prescribed by: STEVE CRESPO on 4/13/20 1053


   Last Action: Discontinued


Metoprolol Succinate (Metoprolol Succinate) 25 Mg Tab.er.24h, 25 MG PO DAILY, 

(Reported)


   Discontinued Reason: No Longer Taking


   Entered as Reported by: RAMONA OG on 4/13/20 1031


   Last Action: Discontinued


[Platform Walker]  , (DME)


   Discontinued Reason: No Longer Taking


   Prescribed by: CHARLOTTE TY on 4/13/20 1352


   Last Action: Discontinued


[Platform Walker]  , (DME)


   Discontinued Reason: No Longer Taking


   Prescribed by: CHARLOTTE TY on 4/13/20 1354


   Last Action: Discontinued





Past Medical-Social-Family Hx


Patient Social History


Smoking Status:  Never a Smoker (chews tobacco)


Type Used:  Smokeless Tobacco


Alcohol Use?:  Yes





Surgeries


History of Surgeries:  Yes (EGD and Colonoscopy)





Respiratory


History of Respiratory Disorde:  Yes


Respiratory Disorders:  Chronic Bronchitis





Cardiovascular


History of Cardiac Disorders:  Yes


Cardiac Disorders:  Chronic Edema/Swelling, Hypertension





Neurological


History of Neurological Disord:  No





Genitourinary


History of Genitourinary Disor:  No





Gastrointestinal


History of Gastrointestinal Di:  No





Musculoskeletal


History of Musculoskeletal Dis:  Yes


Musculoskeletal Disorders:  Arthritis, Chronic Back Pain





Endocrine


History of Endocrine Disorders:  Yes


Endocrine Disorders:  Diabetes, Non-Insulin dep





HEENT


History of HEENT Disorders:  No


Loss of Vision:  Denies


Hearing Impairment:  Denies





Cancer


History of Cancer:  No





Psychosocial


History of Psychiatric Problem:  Yes


Behavioral Health Disorders:  Anxiety, Depression





Integumentary


History of Skin or Integumenta:  Yes


Skin/Integumentary Disorders:  Recent Skin Changes





Family Medical History


Significant Family History:  Heart Disease, Diabetes (brother and sister ), 

Stroke (mother), Other Conditions/Hx (liver failure in 2 of her brothers/. )





Review of Systems-General


Constitutional:  No chills; malaise, weakness, other (no night sweats )


EENTM:  No blurred vision, No double vision


Respiratory:  No cough, No short of breath


Cardiovascular:  No chest pain, No palpitations


Gastrointestinal:  nausea, vomiting (one episode last night, none currently )


Genitourinary:  No dysuria, No hematuria


Musculoskeletal:  No neck pain; other (no arm pain )


Skin:  change in color, dryness, lesions (blister on right foot )


Psychiatric/Neurological:  Denies Anxiety, Denies Depressed, Denies Emotional 

Problems





Physical Exam-General Problems


Physical Exam


General Appearance:  no apparent distress, obese


Eyes:  Bilateral Eye PERRL, Bilateral Eye EOMI


HEENT:  No scleral icterus (R), No scleral icterus (L); other (poor dentition )


Neck:  non-tender, full range of motion, supple


Respiratory:  lungs clear, normal breath sounds, no respiratory distress, no 

accessory muscle use


Cardiovascular:  normal peripheral pulses, regular rate, rhythm, systolic murmur


Gastrointestinal:  soft, no organomegaly, no pulsatile mass, tenderness (mild 

epigastric tenderness)


Rectal:  deferred


Extremities:  non-tender, no pedal edema, no calf tenderness, other (chronic 

venous stasis changes bilaterally, blister on right foot)


Neurologic/Psychiatric:  alert, oriented x 3


Skin:  warm/dry, other (skin breakdown groin area), rash (venous stasis 

dermatitis of lower extremity )


Lymphatic:  no adenopathy (neck, axilla or supraclavicular)





Data Review


Radiology


Date of Exam:10/25/21





CT ABDOMEN/PELVIS W








PROCEDURE: CT abdomen and pelvis with contrast.





TECHNIQUE: Multiple contiguous axial images were obtained through


the abdomen and pelvis after administration of intravenous


contrast. Auto Exposure Controls were utilized during the CT exam


to meet ALARA standards for radiation dose reduction. All CT


scans use one or more of the following dose optimizing


techniques: automated exposure control, MA and/or KvP adjustment


based on patient size and exam type or iterative reconstruction.





INDICATION:  Nausea, vomiting, diarrhea and septic shock.





Comparison is made with prior CT from 04/06/2016.





The heart is enlarged. No discrete liver mass is identified.


Gallbladder appears to be unremarkable. No biliary duct


dilatation. The pancreas and spleen are unremarkable. No adrenal


mass is detected. Kidneys are unremarkable. There are extrarenal


pelves bilaterally. No obstructing lesion is identified. Aorta is


tortuous and calcified but nonaneurysmal. No central


retroperitoneal or mesenteric lymphadenopathy is identified.


Bowel loops are normal caliber. Bladder and uterus are


unremarkable. No free fluid or fluid collection is identified.


Mildly prominent lymph nodes in the inguinal regions bilaterally


are noted. No iliac lymphadenopathy is identified.





IMPRESSION:


1. Cardiomegaly.


2. No acute feature in the abdomen or pelvis is identified.





Dictated by: 





  Dictated on workstation # [id number]








Dict:   10/25/21 [id number]


Trans:   10/25/21 1556


CV [id number]





Interpreted by:     MONSERRAT HAYS MD


Electronically signed by: MONSERRAT HAYS MD 10/25/21 1556





Assessment/Plan


Suspected sepsis r/o Abdominal source





I looked at the CT of the abd/pelvis myself and don't see anything acute; 

although there appears to be some changes in the ascending colon (these could 

just be non-distended colon).  Appendix looks normal and no other inflammation 

seen in the abdomen.


Pt may have just been dehydrated and have some viral infection.  Will follow 

along and monitor her labs and physical exam.  No surgical indication at this 

time. I spoke with her physican, regarding her care.





Supervisory-Addendum Brief


Verification & Attestation


Participated in pt care:  history, MDM, physical


Personally performed:  exam, history, MDM, supervision of care


Care discussed with:  Medical Student


Procedures:  n/a


Verification and Attestation of Medical Student E/M Service





A medical student performed and documented this service. I then reviewed and 

verified all information documented by the medical student and made 

modifications to such information, when appropriate. I personally performed a 

physical exam, medical decision making and then discussed any differences 

between the notes and made revisions as necessary to create one note.





Nicky Glass , 10/25/21 , 17:13











LILIANA OWEN              Oct 25, 2021 12:16


NICKY GLASS DO               Oct 25, 2021 16:58

## 2021-10-25 NOTE — PULMONARY CONSULTATION
IVELISSE MOSES 10/25/21 1410:


History of Present Illness


History of Present Illness


Date Seen by Provider:  Oct 25, 2021


Time Seen by Provider:  13:00


Date of Admission





History of Present Illness


Pt was experiencing extreme weakness and had a vomiting episode this morning. 

She denies anything noteworthy that might have precipitated these events. She 

still feels weak. She denies pain. Her last bowel movement was this morning 

prior to EMS arriving.





Allergies and Home Medications


Allergies


Coded Allergies:  


     Penicillins (Verified  Allergy, Unknown, 4/11/20)





Home Medications


Acetaminophen 325 Mg Capsule, 325-650 MG PO Q8H PRN for PAIN-MILD (1-4), 

(Reported)


Amlodipine Besylate 5 Mg Tablet, 5 MG PO DAILY, (Reported)


Apixaban 5 Mg Tablet, 5 MG PO Q12H, (Reported)


Diphenhydramine HCl 25 Mg Tablet, 25-50 MG PO Q8H PRN for ALLERGY SYMPTOMS, 

(Reported)


Fluoxetine HCl 20 Mg Capsule, 20 MG PO DAILY, (Reported)


Meloxicam 7.5 Mg Tablet, 7.5 MG PO DAILY, (Reported)


Spironolactone 25 Mg Tablet, 25 MG PO DAILY, (Reported)





Past Medical/Social/Family Hx


Patient Social History


Tobacco Use?:  No


Tobacco type used:  Cigarettes


Smoking Status:  Current Everyday Smoker


Use of E-Cig and/or Vaping dev:  No


Substance use?:  No


Alcohol Use?:  Yes


Alcohol type:  Beer


Alcohol Frequency:  Daily


Pt stated abuse/neglect:  No





Immunizations Up To Date


Influenza Vaccine Up-to-Date:  No; Not Current


Tetanus Booster (TDap):  Unknown


Hepatitis A:  No


Hepatitis B:  No


TB Skin Test:  None


Date of Pneumonia Vaccine:  Apr 1, 2018





Current Status


Pregnancy status:  No


Breastfeeding status:  No


Advance Directives:  No


Communicates:  Verbally


Primary Language:  English


Preferred Spoken Language:  English


Is interpretation needed?:  No





Review of Systems


Constitutional:  Weakness; No: Fever, Chills, Sweats, Malaise, Other


Eyes:  No: Pain, Vision change, Conjunctivae inflammation, Eyelid inflammation, 

Other, Redness


ENT:  No: Ear pain, Ear discharge, Nose pain, Nose discharge, Nose congestion, 

Mouth pain, Mouth swelling, Throat pain, Throat swelling, Other


Respiratory:  No: Cough, Dry, Shortness of breath, SOB with excertion, Wheezing,

Hemoptysis, Pleuritic Pain, Sputum, Wheezing, Other


Cardiovascular:  No: Chest Pain, Palpitations, Orthopnea, Paroxysmal Noc. 

Dyspnea, Edema, Lt Headedness, Other


Gastrointestinal:  No: Nausea, Vomiting, Abdominal Pain, Diarrhea, Constipation,

Melena, Hematochezia, Other


Genitourinary:  No Dysuria, No Frequency, No Incontinence, No Hematuria, No 

Retention, No Other


Musculoskeletal:  No: other, neck pain, shoulder pain, arm pain, back pain, hand

pain, leg pain, foot pain


Skin:  No: Rash, Lesions, Jaundice, Bruising, Other


Neurological:  No: Weakness, Numbness, Incoordination, Change in speech, 

Confusion, Seizures, Other





Sepsis Event


Evaluation


Sepsis Stage:  Sepsis


Possible Source:  Genitouriary


Height, Weight, BMI


Height: 0'0.00"


Weight: 0lbs. 0.0oz. 0.328542rh; 24.00 BMI


Method:





Exam


Exam


Patient acknowledged, consented, and participated in this virtual visit which 

was conducted using real time audio/video


Vital Signs








  Date Time  Temp Pulse Resp B/P (MAP) Pulse Ox O2 Delivery O2 Flow Rate FiO2


 


10/25/21 11:35  76 18 99/58 97 Nasal Cannula 2.00 


 


10/25/21 09:29  84 18 108/85 96 Nasal Cannula 2.00 


 


10/25/21 08:25 36.7 89 18 103/64 97 Nasal Cannula 2.00 


 


10/25/21 08:12  101 18 103/70 95 Nasal Cannula 2.00 


 


10/25/21 06:42 38.4       


 


10/25/21 06:15 38.4 116  93/61 (72) 95 Nasal Cannula 2.00 














I & O 


 


 10/25/21





 07:00


 


Intake Total 10 ml


 


Balance 10 ml








Height & Weight


Height: 0'0.00"


Weight: 0lbs. 0.0oz. 0.696366yh; 24.00 BMI


Method:


General Appearance:  No No Apparent Distress, No WD/WN, No Anxious, No 

Chronically ill, No Cachetic, No Mild Distress; Moderate Distress; No Obese, No 

Severe Distress, No Thin; Other (Dried emesis on her right shoulder)


HEENT:  PERRL/EOMI, TMs Normal, Normal ENT Inspection; No Pharynx Normal, No 

Moist Mucous Membranes, No Pale Conjunctivae (L), No Pale Conjunctivae (R), No 

Pharyngeal Erythema, No Photophobia, No Scleral Icterus (L), No Scleral Icterus 

(R), No TM Abnormal (L), No TM Abnormal (R), No Tonsillar Exudate, No Tonsillar 

Enlargement, No Other


Neck:  Full Range of Motion, Normal Inspection, Non Tender, Supple; No Carotid 

Bruit, No JVD, No Limited Range of Motion, No Lymphadenopathy (L), No 

Lymphadenopathy (R), No Tender Lateral, No Tender Midline, No Thyromegaly, No 

Other


Respiratory:  No Chest Non Tender; Lungs Clear, Normal Breath Sounds, No 

Accessory Muscle Use; No No Respiratory Distress, No Accessory Muscle Use, No 

Crackles, No Decreased Breath Sounds, No Expiration, No Inspiration, No Pleural 

Rub, No Rales; Respiratory Distress (Mild to moderate with oxygen saturations 

92% on room air.); No Rhonci, No Stridor, No Wheezing, No Other


Cardiovascular:  Regular Rate, Rhythm; No No Edema, No No Gallop, No No JVD, No 

No Murmur; Normal Peripheral Pulses; No Bradycardia, No Diastolic Murmur, No 

Systolic Murmur, No Extra Beats, No Friction Rub, No Gallop/S3, No Gallop/S4, No

Irregularly Irregular, No JVD; Tachycardia; No Other


Capillary Refill:  Less Than 3 Seconds


Peripheral Pulses:  2+ Radial Pulses (R), 2+ Radial Pulses (L)


Gastrointestinal:  No normal bowel sounds; non tender, soft, no organomegaly, no

pulsatile mass; No abnormal bowel sounds, No distended, No guarding, No rebound;

tenderness (mild epigastric tenderness); No hernia, No mass, No hepatomegaly, No

spleenomegaly, No other


Extremity:  Normal Capillary Refill, Normal Inspection, Normal Range of Motion, 

Non Tender, No Calf Tenderness, No Pedal Edema; No Calf Tenderness, No Inf

lammation, No Pedal Edema, No Pelvis Stable, No Slow Capillary Refill, No 

Swelling, No Other


Neurologic/Psychiatric:  Alert, Oriented x3; No No Motor/Sensory Deficits; 

Normal Mood/Affect; No CNs II-XII Norm as Tested, No Abnormal Cerebellar Tests, 

No Abnormal CNs II-XII, No Abnormal Gait, No Aphasia, No Depressed Affect, No 

Disoriented, No EOM Palsy, No Facial Droop, No Motor Weakness, No Sensory 

Deficit, No Other


Skin:  No Normal Color, No Warm/Dry, No Cool, No Cyanosis, No Damp, No 

Diaphoresis, No Ecchymosis (Pts inguinal, groin and rectal regions display 

significant erythematous skin breakdown. non-pruritic.); Erythema; No Jaundice, 

No Mottled, No Pallor, No Petechia, No Rash, No Tattoos/Piercings, No Other





Results


Lab


Laboratory Tests


10/25/21 06:05











Diagnosis/Problems


Problems/Diagonsis





(1) Sepsis


Status:  Acute


Assessment & Plan:  Pt meets 2/4 SIRS criteria. WBC 13.3 and RR 20. Will 

continue to follow labs as she is managed with antibiotics. She is maintaining 

98% O2 saturation on 2L nasal cannula








SHIREEN ZHENG MD 10/25/21 1453:


History of Present Illness


In septic shock with initial LA 5, given 3 l IVF has come down, has large 

ulceration over  area extending to sacrum, Started on IV Vanco and Cefepime, 

Looks like may have been lying in urine but family denies this.





Not on pressors at this time, CXR shows enlarged heart but no CHF or PNA





Allergies and Home Medications


Allergies


Coded Allergies:  


     Penicillins (Verified  Allergy, Unknown, 4/11/20)





Home Medications


Acetaminophen 325 Mg Capsule, 325-650 MG PO Q8H PRN for PAIN-MILD (1-4), 

(Reported)


Amlodipine Besylate 5 Mg Tablet, 5 MG PO DAILY, (Reported)


Apixaban 5 Mg Tablet, 5 MG PO Q12H, (Reported)


Diphenhydramine HCl 25 Mg Tablet, 25-50 MG PO Q8H PRN for ALLERGY SYMPTOMS, 

(Reported)


Fluoxetine HCl 20 Mg Capsule, 20 MG PO DAILY, (Reported)


Meloxicam 7.5 Mg Tablet, 7.5 MG PO DAILY, (Reported)


Spironolactone 25 Mg Tablet, 25 MG PO DAILY, (Reported)





Review of Systems


Time Seen by Provider:  14:00





Assessment/Plan


Assessment/Plan


Septic shock probably from skin/soft tissue infection, continue abx, cut down on

LR IVF from 150 mL to 100


Wound care to see


Critical Care:  Critically Ill Patient


Time spent with patient (mins):  25





Supervisory-Addendum Brief


Verification & Attestation


Participated in pt care:  history


Personally performed:  history


Care discussed with:  Medical Student


Procedures:  n/a


Results interpretation:  Verified all documentation


physical finding per medical student, I obtained Hx thru medical student and 

nurse











IVELISSE MOSES                   Oct 25, 2021 14:10


SHIREEN ZHENG MD             Oct 25, 2021 14:53

## 2021-10-25 NOTE — HISTORY & PHYSICAL
HPI


History of Present Illness:


72 yo F that presented with altered mental status, N/V and abdominal pain. 

States that she woke up and felt like she was going to vomit. Symptoms started 

abruptly this AM. Denies any chest pain or shortness of breath. States that she 

has had a normal appetite. She has been feeling some chills this AM. She took 

all her medications this AM. Denies any other sick contacts.


Source:  patient


Exam Limitations:  no limitations


Date seen by provider:  Oct 25, 2021


Time Seen by Provider:  10:45


Attending Physician


Steve Pineda MD


PCP


Steve Pineda MD


Consult





Date of Admission


Oct 25, 2021 at 09:00





Home Medications


Home Medications


Reviewed patient Home Medication Reconciliation performed by pharmacy medication

reconciliations technician and/or nursing.


Patients Allergies have been reviewed.





Allergies


Coded Allergies:  


     Penicillins (Verified  Allergy, Unknown, 4/11/20)





PMH-Social-Family Hx


Patient Social History


Smoking Status:  Current Everyday Smoker


2nd Hand Smoke Exposure:  Yes


Recent Hopitalizations:  No


Alcohol Use?:  Yes


Tobacco type used:  Cigarettes





Immunizations Up To Date


Date of Pneumonia Vaccine:  Apr 1, 2018


Date of Influenza Vaccine:  Nov 1, 2019





Past Medical History





Atrial Fibrillation


EtOH abuse


Tobacco Abuse





Family Medical History


Significant Family History:  Heart Disease, Diabetes (brother and sister ), 

Stroke (mother), Other Conditions/Hx (liver failure in 2 of her brothers/. )





Review of Systems ([facility])


Constitutional:  chills, malaise, weakness


EENTM:  no symptoms reported; No mouth pain, No nose congestion, No nose pain


Respiratory:  no symptoms reported; No dyspnea on exertion, No short of breath


Cardiovascular:  no symptoms reported; No chest pain, No edema


Gastrointestinal:  No constipation; diarrhea, loss of appetite, nausea


Genitourinary:  no symptoms reported; No dysuria, No frequency, No hematuria


Pregnant:  No


Musculoskeletal:  no symptoms reported


Skin:  other (dependent rash on buttock, back of legs and back)


Psychiatric/Neurological:  Weakness





Reviewed Test Results


Reviewed Test Results


Lab





Laboratory Tests








Test


 10/25/21


06:05 10/25/21


06:15 10/25/21


06:18 10/25/21


06:36 Range/Units


 


 


White Blood Count


 13.3 H


 


 


 


 4.3-11.0


10^3/uL


 


Red Blood Count


 4.38 


 


 


 


 3.80-5.11


10^6/uL


 


Hemoglobin 12.8     11.5-16.0  g/dL


 


Hematocrit 38     35-52  %


 


Mean Corpuscular Volume 86     80-99  fL


 


Mean Corpuscular Hemoglobin 29     25-34  pg


 


Mean Corpuscular Hemoglobin


Concent 34 


 


 


 


 32-36  g/dL





 


Red Cell Distribution Width 13.9     10.0-14.5  %


 


Platelet Count


 241 


 


 


 


 130-400


10^3/uL


 


Mean Platelet Volume 9.3     9.0-12.2  fL


 


Immature Granulocyte % (Auto) 1      %


 


Neutrophils (%) (Auto) 95 H    42-75  %


 


Lymphocytes (%) (Auto) 2 L    12-44  %


 


Monocytes (%) (Auto) 2     0-12  %


 


Eosinophils (%) (Auto) 0     0-10  %


 


Basophils (%) (Auto) 0     0-10  %


 


Neutrophils # (Auto)


 12.6 H


 


 


 


 1.8-7.8


10^3/uL


 


Lymphocytes # (Auto)


 0.3 L


 


 


 


 1.0-4.0


10^3/uL


 


Monocytes # (Auto)


 0.3 


 


 


 


 0.0-1.0


10^3/uL


 


Eosinophils # (Auto)


 0.0 


 


 


 


 0.0-0.3


10^3/uL


 


Basophils # (Auto)


 0.0 


 


 


 


 0.0-0.1


10^3/uL


 


Immature Granulocyte # (Auto)


 0.1 


 


 


 


 0.0-0.1


10^3/uL


 


Neutrophils % (Manual) 76      %


 


Lymphocytes % (Manual) 3      %


 


Monocytes % (Manual) 3      %


 


Band Neutrophils 18      %


 


Toxic Granulation 1+      


 


Prothrombin Time 16.2 H    12.2-14.7  SEC


 


INR Comment 1.3     0.8-1.4  


 


Activated Partial


Thromboplast Time 33 


 


 


 


 24-35  SEC





 


Sodium Level 126 L    135-145  MMOL/L


 


Potassium Level 3.6     3.6-5.0  MMOL/L


 


Chloride Level 93 L      MMOL/L


 


Carbon Dioxide Level 15 L    21-32  MMOL/L


 


Anion Gap 18 H    5-14  MMOL/L


 


Blood Urea Nitrogen 7     7-18  MG/DL


 


Creatinine


 0.79 


 


 


 


 0.60-1.30


MG/DL


 


Estimat Glomerular Filtration


Rate 71 


 


 


 


  





 


BUN/Creatinine Ratio 9      


 


Glucose Level 119 H      MG/DL


 


Calcium Level 8.5     8.5-10.1  MG/DL


 


Corrected Calcium 9.1     8.5-10.1  MG/DL


 


Total Bilirubin 1.3 H    0.1-1.0  MG/DL


 


Aspartate Amino Transf


(AST/SGOT) 22 


 


 


 


 5-34  U/L





 


Alanine Aminotransferase


(ALT/SGPT) 10 


 


 


 


 0-55  U/L





 


Alkaline Phosphatase 76       U/L


 


C-Reactive Protein High


Sensitivity 3.23 H


 


 


 


 0.00-0.50


MG/DL


 


Total Protein 6.4     6.4-8.2  GM/DL


 


Albumin 3.3     3.2-4.5  GM/DL


 


Lipase 91 H    8-78  U/L


 


Procalcitonin 1.26 H    <0.10  NG/ML


 


Lactic Acid Level


 


 5.09 *H


 


 


 0.50-2.00


MMOL/L


 


Urine Color   YELLOW    


 


Urine Clarity   CLEAR    


 


Urine pH   6.0   5-9  


 


Urine Specific Gravity   1.015 L  1.016-1.022  


 


Urine Protein   NEGATIVE   NEGATIVE  


 


Urine Glucose (UA)   NEGATIVE   NEGATIVE  


 


Urine Ketones   TRACE H  NEGATIVE  


 


Urine Nitrite   NEGATIVE   NEGATIVE  


 


Urine Bilirubin   NEGATIVE   NEGATIVE  


 


Urine Urobilinogen   0.2   < = 1.0  MG/DL


 


Urine Leukocyte Esterase   TRACE H  NEGATIVE  


 


Urine RBC (Auto)   1+ H  NEGATIVE  


 


Urine RBC   2-5 H   /HPF


 


Urine WBC   0-2    /HPF


 


Urine Squamous Epithelial


Cells 


 


 0-2 


 


  /HPF





 


Urine Crystals   NONE    /LPF


 


Urine Bacteria   TRACE    /HPF


 


Urine Casts   PRESENT    /LPF


 


Urine Hyaline Casts   RARE    /LPF


 


Urine Mucus   NEGATIVE    /LPF


 


Urine Culture Indicated   NO    


 


Influenza Type A (RT-PCR)   Not Detected   Not Detecte  


 


Influenza Type B (RT-PCR)   Not Detected   Not Detecte  


 


SARS-CoV-2 RNA (RT-PCR)   Not Detected   Not Detecte  


 


Blood Gas Puncture Site    LEFT RADIAL   


 


Blood Gas Patient Temperature    38.4   


 


Arterial Blood pH    7.41  7.37-7.43  


 


Arterial Blood Partial


Pressure CO2 


 


 


 31 L


 35-45  MMHG





 


Arterial Blood Partial


Pressure O2 


 


 


 94 H


 79-93  MMHG





 


Arterial Blood HCO3    19 L 23-27  MMOL/L


 


Arterial Blood Total CO2


 


 


 


 19.7 L


 21.0-31.0


MMOL/L


 


Arterial Blood Oxygen


Saturation 


 


 


 98 


   %





 


Arterial Blood Base Excess


 


 


 


 -4.7 L


 -2.5-2.5


MMOL/L


 


Andrews Test    YES-POS   


 


Blood Gas Ventilator Setting    NO   


 


Blood Gas Inspired Oxygen    2   


 


Test


 10/25/21


08:41 


 


 


 Range/Units


 


 


Lactic Acid Level


 1.45 


 


 


 


 0.50-2.00


MMOL/L











Physical Exam-([facility])


Physical Exam


Vital Signs





                          VS - Last 72 Hours, by Label








 10/25/21 10/25/21 10/25/21 10/25/21





 06:15 06:42 08:12 08:25


 


Temp 38.4 38.4  36.7


 


Pulse 116  101 89


 


Resp   18 18


 


B/P (MAP) 93/61 (72)  103/70 103/64


 


Pulse Ox 95  95 97


 


O2 Delivery Nasal Cannula  Nasal Cannula Nasal Cannula


 


O2 Flow Rate 2.00  2.00 2.00


 


    





 10/25/21 10/25/21 10/25/21 10/25/21





 09:29 11:00 11:35 11:50


 


Pulse 84  76 


 


Resp 18  18 


 


B/P (MAP) 108/85 103/69 (80) 99/58 


 


Pulse Ox 96  97 


 


O2 Delivery Nasal Cannula  Nasal Cannula Nasal Cannula


 


O2 Flow Rate 2.00  2.00 2.00





 10/25/21 10/25/21 10/25/21 10/25/21





 12:00 13:00 13:00 14:00


 


Pulse 73 73  67


 


Resp 21  21 14


 


B/P (MAP) 117/73 (88)  92/62 (72) 102/75 (84)


 


Pulse Ox 96  99 99


 


O2 Delivery Nasal Cannula  Nasal Cannula Nasal Cannula


 


O2 Flow Rate 2.00  2.00 2.00





 10/25/21 10/25/21  





 15:00 16:00  


 


Temp  36.3  


 


Pulse 64   


 


Resp 21   


 


B/P (MAP) 85/58 (67)   


 


Pulse Ox 99   


 


O2 Delivery Nasal Cannula   


 


O2 Flow Rate 2.00   





Capillary Refill : Less Than 3 Seconds


General Appearance:  WD/WN, no apparent distress


Neck:  non-tender, full range of motion, supple


Respiratory:  chest non-tender, lungs clear, normal breath sounds, no 

respiratory distress, no accessory muscle use


Cardiovascular:  normal peripheral pulses, no edema, irregularly irregular 

(controlled rate)


Gastrointestinal:  normal bowel sounds, non tender, soft, no organomegaly; No 

distended, No guarding, No rebound, No tenderness


Back:  no CVA tenderness, no vertebral tenderness


Extremities:  normal range of motion, no calf tenderness, pedal edema


Neurologic/Psychiatric:  CNs II-XII nml as tested, no motor/sensory deficits, 

alert, normal mood/affect, oriented x 3


Skin:  rash (burns on buttock, perinium, back)


Lymphatic:  no adenopathy





Assessment/Plan


Assessment/Plan


Admission Status:  Inpatient Order (span 2 midnights)


Reason for Inpatient Admission:  


hypotension that needs close monitoring, high risk of decompensation due


to chronic medical conditions





(1) Septic shock


Status:  Acute


Assessment & Plan:  - Repeat LA pending, continue aggressive IVF's to maintain 

MAP, unclear site of infection, procalcitonin elevated, continue Cefepime





(2) Hyponatremia


Status:  Acute


Assessment & Plan:  - Normal sodium in clinic 6/22/21, will continue to monitor,

likely 2/2 dehydration





(3) Chronic atrial fibrillation


Status:  Chronic


Assessment & Plan:  - Rate controlled, continue OAC





(4) DVT prophylaxis


Status:  Acute


Assessment & Plan:  - OAC








Copy


Copies To 1:   STEVE PINEDA MD, HOLLY R MD               Oct 25, 2021 17:01

## 2021-10-25 NOTE — DIAGNOSTIC IMAGING REPORT
EXAMINATION: Chest 1 view



HISTORY: sepsis



COMPARISON: 04/10/2020



FINDINGS: 



There is moderate enlargement of the heart, unchanged. No pleural

effusion or pneumothorax. No edema or pneumonia.



IMPRESSION: 



1. Moderate enlargement of the heart without edema or pneumonia.



Dictated by: 



  Dictated on workstation # [id number]

## 2021-10-26 VITALS — SYSTOLIC BLOOD PRESSURE: 111 MMHG | DIASTOLIC BLOOD PRESSURE: 81 MMHG

## 2021-10-26 VITALS — DIASTOLIC BLOOD PRESSURE: 56 MMHG | SYSTOLIC BLOOD PRESSURE: 89 MMHG

## 2021-10-26 VITALS — DIASTOLIC BLOOD PRESSURE: 56 MMHG | SYSTOLIC BLOOD PRESSURE: 85 MMHG

## 2021-10-26 VITALS — DIASTOLIC BLOOD PRESSURE: 79 MMHG | SYSTOLIC BLOOD PRESSURE: 111 MMHG

## 2021-10-26 VITALS — SYSTOLIC BLOOD PRESSURE: 104 MMHG | DIASTOLIC BLOOD PRESSURE: 75 MMHG

## 2021-10-26 VITALS — DIASTOLIC BLOOD PRESSURE: 51 MMHG | SYSTOLIC BLOOD PRESSURE: 87 MMHG

## 2021-10-26 VITALS — DIASTOLIC BLOOD PRESSURE: 84 MMHG | SYSTOLIC BLOOD PRESSURE: 116 MMHG

## 2021-10-26 VITALS — DIASTOLIC BLOOD PRESSURE: 75 MMHG | SYSTOLIC BLOOD PRESSURE: 113 MMHG

## 2021-10-26 VITALS — DIASTOLIC BLOOD PRESSURE: 69 MMHG | SYSTOLIC BLOOD PRESSURE: 109 MMHG

## 2021-10-26 VITALS — SYSTOLIC BLOOD PRESSURE: 86 MMHG | DIASTOLIC BLOOD PRESSURE: 55 MMHG

## 2021-10-26 VITALS — SYSTOLIC BLOOD PRESSURE: 120 MMHG | DIASTOLIC BLOOD PRESSURE: 67 MMHG

## 2021-10-26 VITALS — SYSTOLIC BLOOD PRESSURE: 96 MMHG | DIASTOLIC BLOOD PRESSURE: 74 MMHG

## 2021-10-26 VITALS — SYSTOLIC BLOOD PRESSURE: 124 MMHG | DIASTOLIC BLOOD PRESSURE: 91 MMHG

## 2021-10-26 VITALS — SYSTOLIC BLOOD PRESSURE: 106 MMHG | DIASTOLIC BLOOD PRESSURE: 66 MMHG

## 2021-10-26 VITALS — SYSTOLIC BLOOD PRESSURE: 113 MMHG | DIASTOLIC BLOOD PRESSURE: 72 MMHG

## 2021-10-26 VITALS — DIASTOLIC BLOOD PRESSURE: 75 MMHG | SYSTOLIC BLOOD PRESSURE: 103 MMHG

## 2021-10-26 VITALS — SYSTOLIC BLOOD PRESSURE: 124 MMHG | DIASTOLIC BLOOD PRESSURE: 88 MMHG

## 2021-10-26 VITALS — DIASTOLIC BLOOD PRESSURE: 87 MMHG | SYSTOLIC BLOOD PRESSURE: 121 MMHG

## 2021-10-26 VITALS — DIASTOLIC BLOOD PRESSURE: 64 MMHG | SYSTOLIC BLOOD PRESSURE: 92 MMHG

## 2021-10-26 VITALS — SYSTOLIC BLOOD PRESSURE: 106 MMHG | DIASTOLIC BLOOD PRESSURE: 72 MMHG

## 2021-10-26 VITALS — DIASTOLIC BLOOD PRESSURE: 70 MMHG | SYSTOLIC BLOOD PRESSURE: 102 MMHG

## 2021-10-26 VITALS — SYSTOLIC BLOOD PRESSURE: 118 MMHG | DIASTOLIC BLOOD PRESSURE: 67 MMHG

## 2021-10-26 VITALS — DIASTOLIC BLOOD PRESSURE: 65 MMHG | SYSTOLIC BLOOD PRESSURE: 120 MMHG

## 2021-10-26 VITALS — DIASTOLIC BLOOD PRESSURE: 81 MMHG | SYSTOLIC BLOOD PRESSURE: 120 MMHG

## 2021-10-26 LAB
BASOPHILS # BLD AUTO: 0 10^3/UL (ref 0–0.1)
BASOPHILS NFR BLD AUTO: 0 % (ref 0–10)
BUN/CREAT SERPL: 15
CALCIUM SERPL-MCNC: 8 MG/DL (ref 8.5–10.1)
CHLORIDE SERPL-SCNC: 102 MMOL/L (ref 98–107)
CO2 SERPL-SCNC: 22 MMOL/L (ref 21–32)
CREAT SERPL-MCNC: 0.62 MG/DL (ref 0.6–1.3)
EOSINOPHIL # BLD AUTO: 0.1 10^3/UL (ref 0–0.3)
EOSINOPHIL NFR BLD AUTO: 1 % (ref 0–10)
GFR SERPLBLD BASED ON 1.73 SQ M-ARVRAT: 94 ML/MIN
GLUCOSE SERPL-MCNC: 100 MG/DL (ref 70–105)
HCT VFR BLD CALC: 33 % (ref 35–52)
HGB BLD-MCNC: 10.7 G/DL (ref 11.5–16)
LYMPHOCYTES # BLD AUTO: 0.8 10^3/UL (ref 1–4)
LYMPHOCYTES NFR BLD AUTO: 6 % (ref 12–44)
MAGNESIUM SERPL-MCNC: 1.3 MG/DL (ref 1.6–2.4)
MANUAL DIFFERENTIAL PERFORMED BLD QL: NO
MCH RBC QN AUTO: 29 PG (ref 25–34)
MCHC RBC AUTO-ENTMCNC: 33 G/DL (ref 32–36)
MCV RBC AUTO: 88 FL (ref 80–99)
MONOCYTES # BLD AUTO: 0.4 10^3/UL (ref 0–1)
MONOCYTES NFR BLD AUTO: 3 % (ref 0–12)
NEUTROPHILS # BLD AUTO: 13 10^3/UL (ref 1.8–7.8)
NEUTROPHILS NFR BLD AUTO: 88 % (ref 42–75)
PHOSPHATE SERPL-MCNC: 2.8 MG/DL (ref 2.3–4.7)
PLATELET # BLD: 174 10^3/UL (ref 130–400)
PMV BLD AUTO: 9.8 FL (ref 9–12.2)
POTASSIUM SERPL-SCNC: 3.6 MMOL/L (ref 3.6–5)
SODIUM SERPL-SCNC: 130 MMOL/L (ref 135–145)
WBC # BLD AUTO: 14.7 10^3/UL (ref 4.3–11)

## 2021-10-26 RX ADMIN — NYSTATIN SCH GM: 100000 CREAM TOPICAL at 13:20

## 2021-10-26 RX ADMIN — VANCOMYCIN HYDROCHLORIDE SCH MLS/HR: 500 INJECTION, POWDER, LYOPHILIZED, FOR SOLUTION INTRAVENOUS at 13:20

## 2021-10-26 RX ADMIN — SODIUM CHLORIDE SCH MLS/HR: 900 INJECTION INTRAVENOUS at 19:35

## 2021-10-26 RX ADMIN — VASOPRESSIN SCH MLS/HR: 20 INJECTION INTRAVENOUS at 21:36

## 2021-10-26 RX ADMIN — SODIUM CHLORIDE, SODIUM LACTATE, POTASSIUM CHLORIDE, AND CALCIUM CHLORIDE SCH MLS/HR: 600; 310; 30; 20 INJECTION, SOLUTION INTRAVENOUS at 19:34

## 2021-10-26 RX ADMIN — APIXABAN SCH MG: 5 TABLET, FILM COATED ORAL at 19:34

## 2021-10-26 RX ADMIN — MAGNESIUM SULFATE IN DEXTROSE SCH MLS/HR: 10 INJECTION, SOLUTION INTRAVENOUS at 04:53

## 2021-10-26 RX ADMIN — MAGNESIUM SULFATE IN DEXTROSE SCH MLS/HR: 10 INJECTION, SOLUTION INTRAVENOUS at 05:50

## 2021-10-26 RX ADMIN — ACETAMINOPHEN PRN MG: 325 TABLET ORAL at 19:34

## 2021-10-26 RX ADMIN — MICONAZOLE NITRATE SCH GM: 20 POWDER TOPICAL at 19:35

## 2021-10-26 RX ADMIN — MAGNESIUM SULFATE IN DEXTROSE SCH MLS/HR: 10 INJECTION, SOLUTION INTRAVENOUS at 08:44

## 2021-10-26 RX ADMIN — NYSTATIN SCH GM: 100000 CREAM TOPICAL at 09:02

## 2021-10-26 RX ADMIN — APIXABAN SCH MG: 5 TABLET, FILM COATED ORAL at 09:02

## 2021-10-26 RX ADMIN — NYSTATIN SCH GM: 100000 CREAM TOPICAL at 21:00

## 2021-10-26 RX ADMIN — SODIUM CHLORIDE SCH MLS/HR: 900 INJECTION INTRAVENOUS at 03:31

## 2021-10-26 RX ADMIN — MICONAZOLE NITRATE SCH GM: 20 POWDER TOPICAL at 09:02

## 2021-10-26 RX ADMIN — POTASSIUM CHLORIDE SCH MLS/HR: 200 INJECTION, SOLUTION INTRAVENOUS at 04:37

## 2021-10-26 RX ADMIN — Medication SCH MLS/HR: at 09:37

## 2021-10-26 RX ADMIN — POTASSIUM CHLORIDE SCH MEQ: 1500 TABLET, EXTENDED RELEASE ORAL at 04:38

## 2021-10-26 RX ADMIN — SODIUM CHLORIDE, SODIUM LACTATE, POTASSIUM CHLORIDE, AND CALCIUM CHLORIDE SCH MLS/HR: 600; 310; 30; 20 INJECTION, SOLUTION INTRAVENOUS at 08:44

## 2021-10-26 RX ADMIN — VASOPRESSIN SCH MLS/HR: 20 INJECTION INTRAVENOUS at 09:38

## 2021-10-26 RX ADMIN — MAGNESIUM SULFATE IN DEXTROSE SCH MLS/HR: 10 INJECTION, SOLUTION INTRAVENOUS at 06:46

## 2021-10-26 RX ADMIN — SODIUM CHLORIDE SCH MLS/HR: 900 INJECTION INTRAVENOUS at 13:20

## 2021-10-26 RX ADMIN — MAGNESIUM SULFATE IN DEXTROSE SCH MLS/HR: 10 INJECTION, SOLUTION INTRAVENOUS at 04:47

## 2021-10-26 RX ADMIN — SODIUM CHLORIDE, SODIUM LACTATE, POTASSIUM CHLORIDE, AND CALCIUM CHLORIDE SCH MLS/HR: 600; 310; 30; 20 INJECTION, SOLUTION INTRAVENOUS at 01:21

## 2021-10-26 NOTE — PROGRESS NOTE
Subjective


Subjective/Events-last exam


Patient states that she is feeling much better. Blood pressures overnight 

labile. Tolerating PO diet. Denies any chest pain, abdominal pain or shortness 

of breath


Review of Systems


General:  Fatigue, Malaise


Pulmonary:  No Dyspnea, No Cough


Cardiovascular:  No: Chest Pain, Palpitations, Edema


Gastrointestinal:  No: Nausea, Vomiting, Abdominal Pain, Diarrhea, Constipation


Genitourinary:  No Dysuria


Musculoskeletal:  No: arm pain, back pain





Focused Exam


Lactate Level


10/25/21 06:15: Lactic Acid Level 5.09*H


10/25/21 08:41: Lactic Acid Level 1.45


Time of Focused Exam:  07:26





Objective


Exam


Last Set of Vital Signs





Vital Signs








  Date Time  Temp Pulse Resp B/P (MAP) Pulse Ox O2 Delivery O2 Flow Rate FiO2


 


10/26/21 08:00     94 Room Air  


 


10/26/21 07:00  68      


 


10/26/21 06:00   17 85/56 (66)    


 


10/26/21 04:00 37.1       


 


10/26/21 01:00       1.00 





Capillary Refill : Less Than 3 Seconds


I&O











Intake and Output 


 


 10/26/21





 00:00


 


Intake Total 5020 ml


 


Output Total 750 ml


 


Balance 4270 ml


 


 


 


Intake Oral 250 ml


 


IV Total 4770 ml


 


Output Urine Total 750 ml


 


# Voids 2


 


Daily Weight Change Unsure








General:  Alert, Oriented X3, Cooperative, No Acute Distress


HEENT:  Mucous Memb Moist/Pink


Lungs:  Clear to Auscultation, Normal Air Movement


Heart:  No Murmurs, Other (irregular irregularly rate)


Abdomen:  Normal Bowel Sounds, Soft, No Tenderness, No Masses


Extremities:  No Edema, No Tenderness/Swelling


Neuro:  Normal Speech, Sensation Intact, Cranial Nerves 3-12 NL





Results/Procedures


Lab


Laboratory Tests


10/26/21 03:35: 


White Blood Count 14.7H, Red Blood Count 3.70L, Hemoglobin 10.7L, Hematocrit 33L

, Mean Corpuscular Volume 88, Mean Corpuscular Hemoglobin 29, Mean Corpuscular 

Hemoglobin Concent 33, Red Cell Distribution Width 14.4, Platelet Count 174, 

Mean Platelet Volume 9.8, Immature Granulocyte % (Auto) 2, Neutrophils (%) 

(Auto) 88H, Lymphocytes (%) (Auto) 6L, Monocytes (%) (Auto) 3, Eosinophils (%) 

(Auto) 1, Basophils (%) (Auto) 0, Neutrophils # (Auto) 13.0H, Lymphocytes # 

(Auto) 0.8L, Monocytes # (Auto) 0.4, Eosinophils # (Auto) 0.1, Basophils # 

(Auto) 0.0, Immature Granulocyte # (Auto) 0.3H, Sodium Level 130L, Potassium 

Level 3.6, Chloride Level 102, Carbon Dioxide Level 22, Anion Gap 6, Blood Urea 

Nitrogen 9, Creatinine 0.62, Estimat Glomerular Filtration Rate 94, BUN/Creati

nine Ratio 15, Glucose Level 100, Calcium Level 8.0L, Phosphorus Level 2.8, 

Magnesium Level 1.3L





Microbiology


10/25/21 Blood Culture - Preliminary, Resulted


           No growth


Radiology


Date of Exam:10/25/21





CT ABDOMEN/PELVIS W








PROCEDURE: CT abdomen and pelvis with contrast.





TECHNIQUE: Multiple contiguous axial images were obtained through


the abdomen and pelvis after administration of intravenous


contrast. Auto Exposure Controls were utilized during the CT exam


to meet ALARA standards for radiation dose reduction. All CT


scans use one or more of the following dose optimizing


techniques: automated exposure control, MA and/or KvP adjustment


based on patient size and exam type or iterative reconstruction.





INDICATION:  Nausea, vomiting, diarrhea and septic shock.





Comparison is made with prior CT from 04/06/2016.





The heart is enlarged. No discrete liver mass is identified.


Gallbladder appears to be unremarkable. No biliary duct


dilatation. The pancreas and spleen are unremarkable. No adrenal


mass is detected. Kidneys are unremarkable. There are extrarenal


pelves bilaterally. No obstructing lesion is identified. Aorta is


tortuous and calcified but nonaneurysmal. No central


retroperitoneal or mesenteric lymphadenopathy is identified.


Bowel loops are normal caliber. Bladder and uterus are


unremarkable. No free fluid or fluid collection is identified.


Mildly prominent lymph nodes in the inguinal regions bilaterally


are noted. No iliac lymphadenopathy is identified.





IMPRESSION:


1. Cardiomegaly.


2. No acute feature in the abdomen or pelvis is identified.





Dictated by: 





  Dictated on workstation # [id number]








Dict:   10/25/21 [id number]


Trans:   10/25/21 [id number]


CV [id number]





Interpreted by:     MONSERRAT HAYS MD


Electronically signed by: MONSERRAT HAYS MD 10/25/21 0085





Assessment/Plan


Assessment/Plan





(1) Septic shock


Status:  Acute


Assessment & Plan:  - Repeat LA pending, continue aggressive IVF's to maintain 

MAP, unclear site of infection, procalcitonin elevated, continue Cefepime


10/26: HDS, blood pressures improving, Continue IV antibiotics, will decrease 

IVFs and monitor blood pressure





(2) Hyponatremia


Status:  Acute


Assessment & Plan:  - Normal sodium in clinic 6/22/21, will continue to monitor,

likely 2/2 dehydration


10/26: Improving, daily CMPs





(3) Chronic atrial fibrillation


Status:  Chronic


Assessment & Plan:  - Rate controlled, continue OAC





(4) DVT prophylaxis


Status:  Acute


Assessment & Plan:  - OAC














STEVE CRESPO MD               Oct 26, 2021 10:04

## 2021-10-26 NOTE — PHYSICAL THERAPY EVALUATION
PT Evaluation-General


Medical Diagnosis


Admission Date


Oct 25, 2021 at 09:00


Medical Diagnosis:  septic shock/ascites/respiratory failure with hypoxia


Onset Date:  Oct 25, 2021





Therapy Diagnosis


Therapy Diagnosis:  debility/weakness





Height/Weight


Height (Feet):  0


Height (Inches):  0.00


Weight (Pounds):  0


Weight (Ounces):  0.0





Precautions


Precautions/Isolations:  Fall Prevention, Standard Precautions





Referral


Physician:  Edwin


Reason for Referral:  Evaluation/Treatment





Medical History


Pertinent Medical History:  Alcoholism, DM, HTN, Smoking


Current History


EMS secondary to inability to get OOB with AMS and weakness


Reviewed History:  Yes





Social History


Home:  Single Level


Current Living Status:  Children





Prior


Prior Level of Function


SCALE: Activities may be completed with or without assistive devices.





6-Indepedent-patient completes the activity by him/herself with no assistance 

from a helper.


5-Set-up or Clean-up Assistance-helper sets up or cleans up; patient completes 

activity. [place] assists only prior to or  


    following the activity.


4-Supervision or Touching Assistance-helper provides verbal cues and/or 

touching/steadying and/or contact guard assistance as patient completes 

activity. Assistance may be provided   


    throughout the activity or intermittently.


3-Partial/Moderate Assistance-helper does LESS THAN HALF the effort. [place] 

lifts, holds or supports trunk or limbs, but provides less than half the effort.


2-Substantial/Maximal Assistance-helper does MORE THAN HALF the effort. [place] 

lifts or holds trunk or limbs and provides more than half the effort.


5-Icnapxzjl-bmakkp does ALL the effort. Patient does none of the effort to 

complete the activity. Or, the assistance of 2 or more helpers is required for 

the patient to complete the  


    activity.


If activity was not attempted, code reason:


7-Patient Refused.


9-Not Applicable-not attempted and the patient did not perform the activity 

before the current illness, exacerbation or injury.


10-Not Attempted due to Environmental Limitations-(lack of equipment, weather 

restraints, etc.).


88-Not Attempted due to Medical Conditions or Safety Concerns.


Bed Mobility:  6


Transfers (B,C,W/C):  6


Gait:  6


Stairs:  6


Indoor Mobility (Ambulation):  Independent


Stairs:  Independent


Prior Devices Use:  None





PT Evaluation-Current


Subjective


Patient agrees to PT.  family present





Objective


Patient Orientation:  Person, Time, Situation


Attachments:  IV





ROM/Strength


ROM Lower Extremities


bilateral LE WFL


Strength Lower Extremities


3/5 grossly bilateral LE





Integumentary/Posture


Integumentary


refer to nursing notes


Bowel Incontinence:  No


Bladder Incontinence:  Yes


Posture


WFL





Neuromuscular


(Tone, Coordination, Reflexes)


grossly intact





Sensory


Vision:  Functional


Hearing:  Functional





Transfers


Roll Left to Right (QC):  4


Sit to Lying (QC):  4


Lying to Sitting/Side of Bed(Q:  4


Sit to Stand (QC):  4


Chair/Bed-to-Chair Xfer(QC):  4





Gait


Does the Patient Walk?:  Yes


Mode of Locomotion:  Walk


Anticipated Mode of Locomotion:  Walk


Walk 10 feet (QC):  4


Walk 50 ft with 2 Turns(QC):  4


Walk 150 ft (QC):  4


Distance:  250'


Gait Assistive Device:  FWW


Comments/Gait Description


safe and functional with on deviation





Balance


Sitting Static:  Normal


Sitting Dynamic:  Normal


Standing Static:  Good


 Standing Dynamic:  Good





Assessment/Needs


71 y.o. female, will be seen short term by skilled PT to address functional 

strength and mobility to improve current LOF to safely return to home at maximum

LOF.


Rehab Potential:  Fair





PT Long Term Goals


Long Term Goals


PT Long Term Goals Time Frame:  Nov 6, 2021


Roll Left & Right (QC):  6


Sit to Lying (QC):  6


Lying-Sitting on Side/Bed(QC):  6


Sit to Stand (QC):  6


Chair/Bed-to-Chair Xfer(QC):  6


Toilet Transfer (QC):  6


Walk 10 feet (QC):  6


Walk 50ft with 2 Turns (QC):  6


Walk 150 ft (QC):  6





PT Plan


Problem List


Problem List:  Activity Tolerance, Functional Strength, Safety, Gait, Transfer, 

Bed Mobility





Treatment/Plan


Treatment Plan:  Continue Plan of Care


Treatment Plan:  Bed Mobility, Education, Functional Activity [place], Functional 

Strength, Gait, Safety, Therapeutic Exercise, Transfers


Treatment Duration:  Nov 6, 2021


Frequency:  6 times per week


Estimated Hrs Per Day:  .25 hour per day


Patient and/or Family Agrees t:  Yes





Time/GCodes


Time In:  1358


Time Out:  1408


Total Billed Treatment Time:  10


Total Billed Treatment


1 visit


EVModC 10 min











MOE AUGUSTIN PT              Oct 26, 2021 14:37

## 2021-10-26 NOTE — TELE-ICU PROGRESS NOTE
Subjective


Date Seen by a Provider:  Oct 26, 2021


Time Seen by a Provider:  07:20


Subjective/Events-last exam


This virtual visit  was conducted using real time audio/video. 


Thank you for asking us to see this patient for AMS and sepsis of undetermined 

etiology. 


PE: Resting comfortably. VSS O2 sat 95% on 1 LPM.


HEENT: No obvious masses, adenopathy or JVD. 


Chest: clear to auscultation. 


CV: RRR S1 S2 No murmur or added sounds. 


Abd: Non-tender. Bowel sounds Y. 


: Unremarkable. [place] 


CNS/psychiatric: Alert and oriented, grossly intact. No obvious focal findings. 


Extremities: I+ edema. Capillary refill < 3 seconds. Buttock wounds.


Skin: unremarkable. 


Results: Elevated WCC 14.7. Decreased Hb 10.7, Na 130. No infilts on CXR.


A/P: Sppsis: Cont abx.


Available chart/ vitals / labs / Images reviewed.


Video assessment done using teleICU camera, rest of exam as per RN.


Critical Care: critically ill patient. Cont. Eliquis, consider transfer.


Discussed with ANGELITA Sloan. Asked RN to reach out to eICU if any questions or 

concerns later. Time spent with patient/coordination of care with other health 

professionals (mins): 15





Sepsis Event


Evaluation


Height, Weight, BMI


Height: 0'0.00"


Weight: 0lbs. 0.0oz. 0.494250pp; 28.47 BMI


Method:





Focused Exam


Lactate Level


10/25/21 06:15: Lactic Acid Level 5.09*H


10/25/21 08:41: Lactic Acid Level 1.45


Time of Focused Exam:  07:26





Exam


Exam


Patient acknowledged, consented, and participated in this virtual visit which 

was conducted using real time audio/video


Vital Signs








  Date Time  Temp Pulse Resp B/P (MAP) Pulse Ox O2 Delivery O2 Flow Rate FiO2


 


10/26/21 06:00  63 17 85/56 (66) 97 Room Air  


 


10/26/21 05:00  71 15 102/70 (81) 96 Room Air  


 


10/26/21 04:00     95 Room Air  


 


10/26/21 04:00 37.1 64 18 120/67 (84) 95 Room Air  


 


10/26/21 03:00  59 18 118/67 (84) 97 Room Air  


 


10/26/21 02:00  75 20 89/56 (67) 96 Room Air  


 


10/26/21 02:00 37.4     Room Air  


 


10/26/21 01:45      Room Air  


 


10/26/21 01:04  69      


 


10/26/21 01:00  66 22 86/55 (65) 95 Nasal Cannula 1.00 


 


10/26/21 00:30 37.6    96 Nasal Cannula 1.00 


 


10/26/21 00:14 37.7       


 


10/26/21 00:00  83 20 87/51 (63) 98 Nasal Cannula 2.00 


 


10/25/21 23:59      Nasal Cannula 1.00 


 


10/25/21 23:44 38.1       


 


10/25/21 23:43    110/73    


 


10/25/21 23:40 38.4       


 


10/25/21 23:00  80 20 134/77 (96) 98 Nasal Cannula 2.00 


 


10/25/21 22:00  74 20 124/79 (94) 99 Nasal Cannula 2.00 


 


10/25/21 21:17      Nasal Cannula 2.00 


 


10/25/21 21:00  72 24 126/76 (93) 99 Nasal Cannula 2.00 


 


10/25/21 20:00  78 29 113/72 (86) 98 Nasal Cannula 2.00 


 


10/25/21 20:00 37.3       


 


10/25/21 20:00      Nasal Cannula 2.00 


 


10/25/21 19:06  67      


 


10/25/21 19:00  66 27 132/86 (101) 98 Nasal Cannula 2.00 


 


10/25/21 18:00  91 22 105/71 (82) 99 Nasal Cannula 2.00 


 


10/25/21 17:00  88 25 107/66 (80) 99 Nasal Cannula 2.00 


 


10/25/21 16:00  70 21 123/115 (118) 99 Nasal Cannula 2.00 


 


10/25/21 16:00 36.3       


 


10/25/21 15:00  64 21 85/58 (67) 99 Nasal Cannula 2.00 


 


10/25/21 14:00  67 14 102/75 (84) 99 Nasal Cannula 2.00 


 


10/25/21 13:00   21 92/62 (72) 99 Nasal Cannula 2.00 


 


10/25/21 13:00  73      


 


10/25/21 12:00  73 21 117/73 (88) 96 Nasal Cannula 2.00 


 


10/25/21 11:50      Nasal Cannula 2.00 


 


10/25/21 11:50 37.1       


 


10/25/21 11:35  76 18 99/58 97 Nasal Cannula 2.00 


 


10/25/21 11:00    103/69 (80)    


 


10/25/21 09:29  84 18 108/85 96 Nasal Cannula 2.00 














I & O 


 


 10/26/21





 07:00


 


Intake Total 6470 ml


 


Output Total 1450 ml


 


Balance 5020 ml








Height & Weight


Height: 0'0.00"


Weight: 0lbs. 0.0oz. 0.193133nw; 28.47 BMI


Method:


General Appearance:  Moderate Distress, Other (Dried emesis on her right shoulde

r)


HEENT:  PERRL/EOMI, TMs Normal, Normal ENT Inspection; No Moist Mucous Membranes


Neck:  Full Range of Motion, Normal Inspection, Non Tender, Supple


Respiratory:  Lungs Clear, Normal Breath Sounds, No Accessory Muscle Use, 

Respiratory Distress (Mild to moderate with oxygen saturations 92% on room air.)


Cardiovascular:  Regular Rate, Rhythm, Normal Peripheral Pulses, Tachycardia


Capillary Refill:  Less Than 3 Seconds


Peripheral Pulses:  2+ Radial Pulses (R), 2+ Radial Pulses (L)


Gastrointestinal:  normal bowel sounds, non tender, soft, no organomegaly; No 

distended, No guarding, No rebound, No tenderness


Extremity:  Normal Capillary Refill, Normal Inspection, Normal Range of Motion, 

Non Tender, No Calf Tenderness, No Pedal Edema


Neurologic/Psychiatric:  Alert, Oriented x3, Normal Mood/Affect


Skin:  No Normal Color, No Warm/Dry, No Cool, No Cyanosis, No Damp, No 

Diaphoresis, No Ecchymosis (Pts inguinal, groin and rectal regions display 

significant erythematous skin breakdown. non-pruritic.); Erythema; No Jaundice, 

No Mottled, No Pallor, No Petechia, No Rash, No Tattoos/Piercings, No Other





Results


Lab


Laboratory Tests


10/25/21 06:05








10/26/21 03:35











Assessment/Plan


Assessment/Plan


See free text.


Critical Care:  Critically Ill Patient











SUNITA ASHBY MD          Oct 26, 2021 08:50

## 2021-10-26 NOTE — PROGRESS NOTE - SURGERY
ANDREW ARGUETA MED STUDENT 10/26/21 0742:


Subjective


Date Seen by a Provider:  Oct 26, 2021


Time Seen by a Provider:  06:45


Subjective/Events-last exam


Patient seen this morning at beside in ICU. She is alert and oriented to person,

day, place. This morning she has no complaints. She states she is feeling better

relative to yesterday. Her vitals were stable when seen. She is denying feeling 

feverish, sweating, cough, chest pain or palpations.





Focused Exam


Lactate Level


10/25/21 06:15: Lactic Acid Level 5.09*H


10/25/21 08:41: Lactic Acid Level 1.45


Time of Focused Exam:  07:26





Objective


Exam





Vital Signs








  Date Time  Temp Pulse Resp B/P (MAP) Pulse Ox O2 Delivery O2 Flow Rate FiO2


 


10/26/21 06:00  63 17 85/56 (66) 97 Room Air  


 


10/26/21 05:00  71 15 102/70 (81) 96 Room Air  


 


10/26/21 04:00     95 Room Air  


 


10/26/21 04:00 37.1 64 18 120/67 (84) 95 Room Air  


 


10/26/21 03:00  59 18 118/67 (84) 97 Room Air  


 


10/26/21 02:00  75 20 89/56 (67) 96 Room Air  


 


10/26/21 02:00 37.4     Room Air  


 


10/26/21 01:45      Room Air  


 


10/26/21 01:04  69      


 


10/26/21 01:00  66 22 86/55 (65) 95 Nasal Cannula 1.00 


 


10/26/21 00:30 37.6    96 Nasal Cannula 1.00 


 


10/26/21 00:14 37.7       


 


10/26/21 00:00  83 20 87/51 (63) 98 Nasal Cannula 2.00 


 


10/25/21 23:59      Nasal Cannula 1.00 


 


10/25/21 23:44 38.1       


 


10/25/21 23:43    110/73    


 


10/25/21 23:40 38.4       


 


10/25/21 23:00  80 20 134/77 (96) 98 Nasal Cannula 2.00 


 


10/25/21 22:00  74 20 124/79 (94) 99 Nasal Cannula 2.00 


 


10/25/21 21:17      Nasal Cannula 2.00 


 


10/25/21 21:00  72 24 126/76 (93) 99 Nasal Cannula 2.00 


 


10/25/21 20:00  78 29 113/72 (86) 98 Nasal Cannula 2.00 


 


10/25/21 20:00 37.3       


 


10/25/21 20:00      Nasal Cannula 2.00 


 


10/25/21 19:06  67      


 


10/25/21 19:00  66 27 132/86 (101) 98 Nasal Cannula 2.00 


 


10/25/21 18:00  91 22 105/71 (82) 99 Nasal Cannula 2.00 


 


10/25/21 17:00  88 25 107/66 (80) 99 Nasal Cannula 2.00 


 


10/25/21 16:00  70 21 123/115 (118) 99 Nasal Cannula 2.00 


 


10/25/21 16:00 36.3       


 


10/25/21 15:00  64 21 85/58 (67) 99 Nasal Cannula 2.00 


 


10/25/21 14:00  67 14 102/75 (84) 99 Nasal Cannula 2.00 


 


10/25/21 13:00   21 92/62 (72) 99 Nasal Cannula 2.00 


 


10/25/21 13:00  73      


 


10/25/21 12:00  73 21 117/73 (88) 96 Nasal Cannula 2.00 


 


10/25/21 11:50      Nasal Cannula 2.00 


 


10/25/21 11:50 37.1       


 


10/25/21 11:35  76 18 99/58 97 Nasal Cannula 2.00 


 


10/25/21 11:00    103/69 (80)    


 


10/25/21 09:29  84 18 108/85 96 Nasal Cannula 2.00 


 


10/25/21 08:25 36.7 89 18 103/64 97 Nasal Cannula 2.00 


 


10/25/21 08:12  101 18 103/70 95 Nasal Cannula 2.00 














I & O 


 


 10/26/21





 07:00


 


Intake Total 6470 ml


 


Output Total 1450 ml


 


Balance 5020 ml





Capillary Refill : Less Than 3 Seconds


General Appearance:  No Apparent Distress


HEENT:  PERRL/EOMI


Neck:  Normal Inspection, Non Tender


Respiratory:  Lungs Clear, Normal Breath Sounds, No Accessory Muscle Use, 

Respiratory Distress (Mild to moderate with oxygen saturations 92% on room air.)


Cardiovascular:  Normal Peripheral Pulses, Irregularly Irregular


Peripheral Pulses:  2+ Radial Pulses (R), 2+ Radial Pulses (L)


Gastrointestinal:  non tender, soft, no organomegaly; No distended, No guarding,

No rebound, No tenderness


Extremity:  Normal Capillary Refill, Normal Inspection, Normal Range of Motion, 

Non Tender, No Calf Tenderness, No Pedal Edema


Neurologic/Psychiatric:  Alert, Oriented x3, Normal Mood/Affect


Skin:  Normal Color, Warm/Dry, Erythema





Results


Lab


Laboratory Tests


10/25/21 08:41: Lactic Acid Level 1.45


10/26/21 03:35: 


White Blood Count 14.7H, Red Blood Count 3.70L, Hemoglobin 10.7L, Hematocrit 33L

, Mean Corpuscular Volume 88, Mean Corpuscular Hemoglobin 29, Mean Corpuscular 

Hemoglobin Concent 33, Red Cell Distribution Width 14.4, Platelet Count 174, 

Mean Platelet Volume 9.8, Immature Granulocyte % (Auto) 2, Neutrophils (%) 

(Auto) 88H, Lymphocytes (%) (Auto) 6L, Monocytes (%) (Auto) 3, Eosinophils (%) 

(Auto) 1, Basophils (%) (Auto) 0, Neutrophils # (Auto) 13.0H, Lymphocytes # 

(Auto) 0.8L, Monocytes # (Auto) 0.4, Eosinophils # (Auto) 0.1, Basophils # 

(Auto) 0.0, Immature Granulocyte # (Auto) 0.3H, Sodium Level 130L, Potassium 

Level 3.6, Chloride Level 102, Carbon Dioxide Level 22, Anion Gap 6, Blood Urea 

Nitrogen 9, Creatinine 0.62, Estimat Glomerular Filtration Rate 94, 

BUN/Creatinine Ratio 15, Glucose Level 100, Calcium Level 8.0L, Phosphorus Level

2.8, Magnesium Level 1.3L





Assessment/Plan


Suspected sepsis r/o Abdominal source


No signs of intrabdominal infection


there is no surgical intervention to offer at this time


will sign off, but will be available if there is an acute change.





NATHAN ROWAN DO 10/26/21 2123:


Subjective


Time Seen by a Provider:  11:26


Subjective/Events-last exam


Pt seen and examined, no new complaints.  Denies abdominal pain and is 

tolerating diet.  States she has a yeast infection in her groin.


Review of Systems


General:  No Chills


Pulmonary:  No Dyspnea, No Cough


Cardiovascular:  No: Chest Pain, Palpitations


Gastrointestinal:  No: Nausea, Vomiting





Objective


Exam


General Appearance:  No Apparent Distress


HEENT:  PERRL/EOMI


Respiratory:  Lungs Clear; No Normal Breath Sounds, No No Accessory Muscle Use; 

Respiratory Distress (Mild to moderate with oxygen saturations 92% on room air.)


Cardiovascular:  No Murmur, Irregularly Irregular


Gastrointestinal:  non tender, soft, no organomegaly


Neurologic/Psychiatric:  Alert, Oriented x3





Assessment/Plan


Suspected sepsis r/o Abdominal source


No signs of intrabdominal infection and therefore no surgical intervention to 

offer at this time will sign off, but will be available if there is an acute 

change.





Supervisory-Addendum Brief


Verification & Attestation


Participated in pt care:  history, MDM, physical


Personally performed:  exam, history, MDM, supervision of care


Care discussed with:  Medical Student


Procedures:  n/a


Verification and Attestation of Medical Student E/M Service





A medical student performed and documented this service. I then reviewed and 

verified all information documented by the medical student and made 

modifications to such information, when appropriate. I personally performed a 

physical exam, medical decision making and then discussed any differences 

between the notes and made revisions as necessary to create one note.





Nathan Rowan , 10/26/21 , 21:23











ANDREW ARGUETA MED STUDENT  Oct 26, 2021 07:42


NATHAN ROWAN DO               Oct 26, 2021 21:23

## 2021-10-26 NOTE — OCCUPATIONAL THERAPY EVAL
OT Evaluation-General/PLF


Medical Diagnosis


Admission Date


Oct 25, 2021 at 09:00


Medical Diagnosis:  septic shock/ascites/respiratory failure with hypoxia


Onset Date:  Oct 25, 2021





Therapy Diagnosis


Therapy Diagnosis:  decreased ADL status





Height/Weight


Height (Feet):  0


Height (Inches):  0.00


Weight (Pounds):  0


Weight (Ounces):  0.0





Precautions


Precautions/Isolations:  Fall Prevention, Standard Precautions





Referral


Physician:  Edwin


Referral Reason:  Evaluation/Treatment





Medical History


Additional Medical History


atrial Fibrillation, EtOH abuse, Tobacco Abuse


Current History


ED due to AMS, n/v and abdominal pain.





Social History


Home:  Single Level


Current Living Status:  Spouse


Entry Into Home:  Stairs With Railing (bilateral)


 Steps Into Home:  2


Pt lives with her spouse and 4 other family members.





ADL-Prior Level of Function


SCALE: Activities may be completed with or without assistive devices.





6-Indepedent-patient completes the activity by him/herself with no assistance 

from a helper.


5-Set-up or Clean-up Assistance-helper sets up or cleans up; patient completes 

activity. [place] assists only prior to or  


    following the activity.


4-Supervision or Touching Assistance-helper provides verbal cues and/or 

touching/steadying and/or contact guard assistance as patient completes activit

y. Assistance may be provided   


    throughout the activity or intermittently.


3-Partial/Moderate Assistance-helper does LESS THAN HALF the effort. [place] 

lifts, holds or supports trunk or limbs, but provides less than half the effort.


2-Substantial/Maximal Assistance-helper does MORE THAN HALF the effort. [place] 

lifts or holds trunk or limbs and provides more than half the effort.


6-Uanudpthc-peqmke does ALL the effort. Patient does none of the effort to 

complete the activity. Or, the assistance of 2 or more helpers is required for 

the patient to complete the  


    activity.


If activity was not attempted, code reason:


7-Patient Refused.


9-Not Applicable-not attempted and the patient did not perform the activity 

before the current illness, exacerbation or injury.


10-Not Attempted due to Environmental Limitations-(lack of equipment, weather 

restraints, etc.).


88-Not Attempted due to Medical Conditions or Safety Concerns.


ADL PLOF Comments


Pt's daughter in law assists pt with medication management. Pt is independent 

with bathing/dressing and toileting. IND with functional mobility, no AD.


Self Care:  Independent


Functional Cognition:  Independent


DME/Equipment:  Bath Bench, Shower Hose Extender, Tall Toilet, Tub/Shower





OT Current Status


Subjective


Pt seated in recliner, daughter present. Pt agreeable to OT evaluation/tx.





Mental Status/Objective


Patient Orientation:  Normal For Age


Attachments:  IV





Current


Dentures/Partials:  No


Upper Extremity ROM


decreased, BUE shoulder flexion to approx 80 degrees, WFL at 

elbows/wrist/fingers.


Upper Extremity Coordination


WFL


Upper Extremity Strength


grossly 3/5





ADL-Treatment


Eating (QC):  6 (IND with lunch per report.)


Oral Hygiene (QC):  9





Other Treatments


Pt seated in recliner, agreeable to OT Tx with focus on ADLs. She declined 

sponge bath at this time but agreeable to washing her hair with a shower cap. Pt

indicates at home, she leans over the sink to wash her hair due to decreased 

shoulder ROM. OT placed shower cap on pt's head, then required total assist to 

wash her hair due to lines/attachments getting in her way and decreased shoulder

ROM. OT then dependently brushed pt's hair due to decreased shoulder ROM. Pt 

declines further ADLs at this time, agreeable to OT POC with focus on increasing

BUE strength and activity tolerance, and increasing safety and independence with

ADLs and functional mobility. Post tx, pt in recliner, call light in reach and 

all needs met.





Education


OT Patient Education:  Correct positioning, Energy conservation, Modified ADL 

techniques, Progress toward Goal/Update tx plan, Purpose of tx/functional 

activities, Rehab process


Teaching Recipient:  Patient


Teaching Methods:  Discussion


Response to Teaching:  Verbalize Understanding





OT Long Term Goals


Long Term Goals


Time Frame:  Nov 5, 2021


Eating (QC):  6


Oral Hygiene (QC):  6


Toileting Hygiene (QC):  6


Shower/Bathe Self (QC):  6


Upper Body Dressing (QC):  6


Lower Body Dressing (QC):  6


On/Off Footwear (QC):  6


Additional Goals:  1-Demonstrate ADL Tasks, 2-Verbalize Understanding, 

3-ImproveStrength/[place]


1=Demonstrate adherence to instructed precautions during ADL tasks.


2=Patient will verbalize/demonstrate understanding of assistive 

devices/modifications for ADL.


3=Patient will improve strength/tolerance for activity to enable patient to perf

orm ADL's.





OT Education/Plan


Problem List/Assessment


Assessment:  Decreased Activ Tolerance, Decreased UE Strength, Impaired Funct 

Balance, Impaired I ADL's, Impaired Self-Care Skills





Discharge Recommendations


Plan/Recommendations:  Continue POC


Therapy Discharge Recommendati:  Home & Family





Treatment Plan/Plan of Care


Patient would benefit from OT for education, treatment and training to promote 

independence in ADL's, mobility, safety and/or upper extremity function for 

ADL's.


Plan of Care:  ADL Retraining, Functional Mobility, UE Funct Exercise/Act


Treatment Duration:  Nov 5, 2021


Frequency:  5 times per week


Estimated Hrs Per Day:  1.5 hours per day


Agreement:  Yes


Rehab Potential:  Good





Time/GCodes


Start Time:  14:20


Stop Time:  14:37


Total Time Billed (hr/min):  17


Billed Treatment Time


1, PUJA WHITLEY OT          Oct 26, 2021 14:34

## 2021-10-27 VITALS — DIASTOLIC BLOOD PRESSURE: 85 MMHG | SYSTOLIC BLOOD PRESSURE: 128 MMHG

## 2021-10-27 VITALS — DIASTOLIC BLOOD PRESSURE: 68 MMHG | SYSTOLIC BLOOD PRESSURE: 106 MMHG

## 2021-10-27 VITALS — SYSTOLIC BLOOD PRESSURE: 109 MMHG | DIASTOLIC BLOOD PRESSURE: 72 MMHG

## 2021-10-27 VITALS — DIASTOLIC BLOOD PRESSURE: 65 MMHG | SYSTOLIC BLOOD PRESSURE: 93 MMHG

## 2021-10-27 VITALS — SYSTOLIC BLOOD PRESSURE: 102 MMHG | DIASTOLIC BLOOD PRESSURE: 68 MMHG

## 2021-10-27 VITALS — DIASTOLIC BLOOD PRESSURE: 74 MMHG | SYSTOLIC BLOOD PRESSURE: 114 MMHG

## 2021-10-27 VITALS — SYSTOLIC BLOOD PRESSURE: 93 MMHG | DIASTOLIC BLOOD PRESSURE: 65 MMHG

## 2021-10-27 VITALS — DIASTOLIC BLOOD PRESSURE: 80 MMHG | SYSTOLIC BLOOD PRESSURE: 131 MMHG

## 2021-10-27 VITALS — DIASTOLIC BLOOD PRESSURE: 65 MMHG | SYSTOLIC BLOOD PRESSURE: 102 MMHG

## 2021-10-27 VITALS — DIASTOLIC BLOOD PRESSURE: 85 MMHG | SYSTOLIC BLOOD PRESSURE: 130 MMHG

## 2021-10-27 LAB
ALBUMIN SERPL-MCNC: 2.5 GM/DL (ref 3.2–4.5)
ALP SERPL-CCNC: 59 U/L (ref 40–136)
ALT SERPL-CCNC: 15 U/L (ref 0–55)
BASOPHILS # BLD AUTO: 0.1 10^3/UL (ref 0–0.1)
BASOPHILS NFR BLD AUTO: 0 % (ref 0–10)
BILIRUB SERPL-MCNC: 0.6 MG/DL (ref 0.1–1)
BUN/CREAT SERPL: 17
CALCIUM SERPL-MCNC: 8.2 MG/DL (ref 8.5–10.1)
CHLORIDE SERPL-SCNC: 101 MMOL/L (ref 98–107)
CO2 SERPL-SCNC: 22 MMOL/L (ref 21–32)
CREAT SERPL-MCNC: 0.66 MG/DL (ref 0.6–1.3)
EOSINOPHIL # BLD AUTO: 0.2 10^3/UL (ref 0–0.3)
EOSINOPHIL NFR BLD AUTO: 2 % (ref 0–10)
EOSINOPHIL NFR BLD MANUAL: 4 %
GFR SERPLBLD BASED ON 1.73 SQ M-ARVRAT: 88 ML/MIN
GLUCOSE SERPL-MCNC: 92 MG/DL (ref 70–105)
HCT VFR BLD CALC: 33 % (ref 35–52)
HGB BLD-MCNC: 10.4 G/DL (ref 11.5–16)
LYMPHOCYTES # BLD AUTO: 1.2 10^3/UL (ref 1–4)
LYMPHOCYTES NFR BLD AUTO: 11 % (ref 12–44)
MAGNESIUM SERPL-MCNC: 2 MG/DL (ref 1.6–2.4)
MCH RBC QN AUTO: 28 PG (ref 25–34)
MCHC RBC AUTO-ENTMCNC: 32 G/DL (ref 32–36)
MCV RBC AUTO: 89 FL (ref 80–99)
MONOCYTES # BLD AUTO: 0.4 10^3/UL (ref 0–1)
MONOCYTES NFR BLD AUTO: 3 % (ref 0–12)
MONOCYTES NFR BLD: 3 %
NEUTROPHILS # BLD AUTO: 9.4 10^3/UL (ref 1.8–7.8)
NEUTROPHILS NFR BLD AUTO: 83 % (ref 42–75)
NEUTS BAND NFR BLD MANUAL: 84 %
NEUTS BAND NFR BLD: 3 %
PHOSPHATE SERPL-MCNC: 2.6 MG/DL (ref 2.3–4.7)
PLATELET # BLD: 169 10^3/UL (ref 130–400)
PMV BLD AUTO: 10.3 FL (ref 9–12.2)
POTASSIUM SERPL-SCNC: 3.8 MMOL/L (ref 3.6–5)
PROT SERPL-MCNC: 5 GM/DL (ref 6.4–8.2)
SODIUM SERPL-SCNC: 132 MMOL/L (ref 135–145)
VARIANT LYMPHS NFR BLD MANUAL: 6 %
WBC # BLD AUTO: 11.4 10^3/UL (ref 4.3–11)

## 2021-10-27 RX ADMIN — VASOPRESSIN SCH MLS/HR: 20 INJECTION INTRAVENOUS at 07:55

## 2021-10-27 RX ADMIN — NYSTATIN SCH GM: 100000 CREAM TOPICAL at 12:21

## 2021-10-27 RX ADMIN — POTASSIUM CHLORIDE SCH MLS/HR: 200 INJECTION, SOLUTION INTRAVENOUS at 05:12

## 2021-10-27 RX ADMIN — Medication SCH ML: at 14:00

## 2021-10-27 RX ADMIN — APIXABAN SCH MG: 5 TABLET, FILM COATED ORAL at 19:48

## 2021-10-27 RX ADMIN — MICONAZOLE NITRATE SCH APPLIC: 20 POWDER TOPICAL at 21:24

## 2021-10-27 RX ADMIN — NYSTATIN SCH GM: 100000 CREAM TOPICAL at 09:05

## 2021-10-27 RX ADMIN — SODIUM CHLORIDE SCH MLS/HR: 900 INJECTION INTRAVENOUS at 12:22

## 2021-10-27 RX ADMIN — MICONAZOLE NITRATE SCH GM: 20 POWDER TOPICAL at 09:05

## 2021-10-27 RX ADMIN — VANCOMYCIN HYDROCHLORIDE SCH MLS/HR: 500 INJECTION, POWDER, LYOPHILIZED, FOR SOLUTION INTRAVENOUS at 12:22

## 2021-10-27 RX ADMIN — SODIUM CHLORIDE, SODIUM LACTATE, POTASSIUM CHLORIDE, AND CALCIUM CHLORIDE SCH MLS/HR: 600; 310; 30; 20 INJECTION, SOLUTION INTRAVENOUS at 02:54

## 2021-10-27 RX ADMIN — NYSTATIN SCH GM: 100000 CREAM TOPICAL at 21:00

## 2021-10-27 RX ADMIN — SODIUM CHLORIDE SCH MLS/HR: 900 INJECTION INTRAVENOUS at 19:48

## 2021-10-27 RX ADMIN — SODIUM CHLORIDE SCH MLS/HR: 900 INJECTION INTRAVENOUS at 04:26

## 2021-10-27 RX ADMIN — APIXABAN SCH MG: 5 TABLET, FILM COATED ORAL at 08:52

## 2021-10-27 RX ADMIN — MAGNESIUM SULFATE IN DEXTROSE SCH MLS/HR: 10 INJECTION, SOLUTION INTRAVENOUS at 05:12

## 2021-10-27 RX ADMIN — Medication SCH MLS/HR: at 05:13

## 2021-10-27 RX ADMIN — POTASSIUM CHLORIDE SCH MEQ: 1500 TABLET, EXTENDED RELEASE ORAL at 05:13

## 2021-10-27 RX ADMIN — SODIUM CHLORIDE, SODIUM LACTATE, POTASSIUM CHLORIDE, AND CALCIUM CHLORIDE SCH MLS/HR: 600; 310; 30; 20 INJECTION, SOLUTION INTRAVENOUS at 08:52

## 2021-10-27 RX ADMIN — Medication SCH ML: at 19:48

## 2021-10-27 NOTE — PHYSICAL THERAPY DAILY NOTE
PT Daily Note-Current


Subjective


Pt in recliner w/ family in room and agrees to tx. Pt has no c/o pain





Mental Status


Patient Orientation:  Person, Confused, Place





Transfers


SCALE: Activities may be completed with or without assistive devices.





6-Indepedent-patient completes the activity by him/herself with no assistance 

from a helper.


5-Set-up or Clean-up Assistance-helper sets up or cleans up; patient completes 

activity. [place] assists only prior to or  


    following the activity.


4-Supervision or Touching Assistance-helper provides verbal cues and/or 

touching/steadying and/or contact guard assistance as patient completes 

activity. Assistance may be provided   


    throughout the activity or intermittently.


3-Partial/Moderate Assistance-helper does LESS THAN HALF the effort. [place] li

fts, holds or supports trunk or limbs, but provides less than half the effort.


2-Substantial/Maximal Assistance-helper does MORE THAN HALF the effort. [place] 

lifts or holds trunk or limbs and provides more than half the effort.


3-Ltslrwpyx-czzfsx does ALL the effort. Patient does none of the effort to 

complete the activity. Or, the assistance of 2 or more helpers is required for 

the patient to complete the  


    activity.


If activity was not attempted, code reason:


7-Patient Refused.


9-Not Applicable-not attempted and the patient did not perform the activity 

before the current illness, exacerbation or injury.


10-Not Attempted due to Environmental Limitations-(lack of equipment, weather 

restraints, etc.).


88-Not Attempted due to Medical Conditions or Safety Concerns.


Sit to Stand (QC):  5





Gait Training


Does the Patient Walk?:  Yes


Distance:  250'


Walk 10 feet (QC):  5


Walk 50 ft with 2 Turns(QC):  5


Walk 150 ft (QC):  5


Gait Assistive Device:  FWW


No gait deviations at this time. VC to avoid obstacles





Treatments


Pt in recliner, sit to stand and amb 250' SBA. Pt has steady, functional gait 

with no deviations. Pt returns to recliner with all needs met, call light in 

hand.





Assessment


Current Status:  Good Progress


Pt increasing endurance, gait, and mobility





PT Long Term Goals


Long Term Goals


PT Long Term Goals Time Frame:  Nov 6, 2021


Roll Left & Right (QC):  6


Sit to Lying (QC):  6


Lying-Sitting on Side/Bed(QC):  6


Sit to Stand (QC):  6


Chair/Bed-to-Chair Xfer(QC):  6


Toilet Transfer (QC):  6


Walk 10 feet (QC):  6


Walk 50ft with 2 Turns (QC):  6


Walk 150 ft (QC):  6





PT Plan


Treatment/Plan


Treatment Plan:  Continue Plan of Care


Treatment Plan:  Bed Mobility, Education, Functional Activity [place], Functional 

Strength, Gait, Safety, Therapeutic Exercise, Transfers


Treatment Duration:  Nov 6, 2021


Frequency:  6 times per week


Estimated Hrs Per Day:  .25 hour per day


Patient and/or Family Agrees t:  Yes





Time/GCodes


Time In:  1149


Time Out:  1201


Total Billed Treatment Time:  12


Total Billed Treatment


1, GT











PATRICIA BRANHAM PTA              Oct 27, 2021 12:10

## 2021-10-27 NOTE — PHYSICIAN QUERY CLARIFICATION
Physician Query-General


Query to Physician:


The medical record reflects the following clinical scenario:





The patient, in the setting of History/Risk factors, Tobacco abuse, Current 

everyday smoker, ETOH abuse


Clinical Findings VS: ,  RR 18, BP 93/61, SpO2 92% sat on 2 L T 38.4, SOA 

with Exertion, Course lung sounds, Dry cough Chest X-ray "Moderate enlargement 

of the heart without edema or pneumonia"


Treatment  normal saline 3 L, ceftriaxone IV, azithromycin IV





Question:  Do you agree with the impression of Pneumonia per Dr. NINOSKA Salazar? 





1. Yes; will document Pneumonia, present on admission in the Progress Notes


2. No; will continue current documentation in the Progress Notes 


3. Other; will document explanation of clinical findings


4. Clinically undetermined; no explanation for clinical findings








Please clarify and document your clinical opinion in the Progress Notes and 

Discharge Summary including the definitive and/or presumptive diagnosis, 

(suspected or probable), related to the above clinical findings. Please include 

clinical findings supporting your diagnosis.


In responding to this query, please exercise your independent professional 

judgment.  The purpose of this communication is to more accurately reflect the 

complexity of your patients condition. The fact that a question is asked does 

not imply that any particular answer is desired or expected.  





Please remember a lack of response to the above will prompt a phone page by 

CDI/coding staff





Thank you for timely response to this clarification.   


      


Juliet Tony MSN, RN


Clinical 


[phone]


[email or web address]





PHYSICIAN RESPONSE:


Based on the clinical findings in the record, please respond to the query above 

on this document as an addendum. 








Physician Response:


Physician Response


Patient's course in the hospital was not consistent with PNA














If you have questions please contact:


                   


:


Ext:





Thank you for your time and cooperation.


Clinical /








*********************This is a permanent part of the medical 

record*******************











JULIET TONY                   Oct 27, 2021 17:57


STEVE CRESPO MD               Oct 28, 2021 19:23

## 2021-10-27 NOTE — PHYSICIAN QUERY CLARIFICATION
Physician Query-General


Query to Physician:


The medical record reflects the following clinical evidence:





Clinical Indicators: Documentation on the Nursing admission assessment of a 

stage 2 pressure ulcer to the buttock


Risk Factor(s): Tobacco and ETOH abuse, Weakness


Treatment: Turn/reposition q 2 hours, "Ken's Butt paste", off loading with

pillows, Zinc ointment, HOB <30 degrees, 





1.  Pressure ulcer of unspecified buttock, stage 2, present on admission


2. Other explanation of clinical findings


3. Unable to determine (no explanation for clinical findings)








Please clarify and document your clinical opinion in the progress notes and 

discharge summary including the definitive and/or presumptive diagnosis, 

(suspected or probable), related to the above clinical findings. Please include 

clinical findings supporting your diagnosis.





Juliet Tony MSN, RN


Clinical 


[phone]


[email or web address]





PHYSICIAN RESPONSE:


Based on the clinical findings in the record, please respond to the query above 

on this document as an addendum. 








Physician Response:


Physician Response


1. Wound was present on admission














If you have questions please contact:


                   


:


Ext:





Thank you for your time and cooperation.


Clinical /








*********************This is a permanent part of the medical 

record*******************











JULIET TONY                   Oct 27, 2021 18:26


STEVE CRESPO MD               Oct 28, 2021 19:24

## 2021-10-27 NOTE — OCCUPATIONAL THER DAILY NOTE
OT Current Status-Daily Note


Subjective


Pt alert, lying in bed.  Pt agrees to therapy.  No c/o pain.





Mental Status/Objective


Patient Orientation:  Person, Place, Time, Situation


Attachments:  [place] Catheter ([place]), IV, Telemetry





ADL-Treatment


Therapy Code Descriptions/Definitions 





Functional [place] Measure:


0=Not Assessed/NA        4=Minimal Assistance


1=Total Assistance        5=Supervision or Setup


2=Maximal Assistance  6=Modified [place]


3=Moderate Assistance 7=Complete IndependenceSCALE: Activities may be completed 

with or without assistive devices.





6-Indepedent-patient completes the activity by him/herself with no assistance 

from a helper.


5-Set-up or Clean-up Assistance-helper sets up or cleans up; patient completes 

activity. [place] assists only prior to or  


    following the activity.


4-Supervision or Touching Assistance-helper provides verbal cues and/or 

touching/steadying and/or contact guard assistance as patient completes activit

y. Assistance may be provided   


    throughout the activity or intermittently.


3-Partial/Moderate Assistance-helper does LESS THAN HALF the effort. [place] 

lifts, holds or supports trunk or limbs, but provides less than half the effort.


2-Substantial/Maximal Assistance-helper does MORE THAN HALF the effort. [place] 

lifts or holds trunk or limbs and provides more than half the effort.


1-Svaefxkrl-uiilrn does ALL the effort. Patient does none of the effort to 

complete the activity. Or, the assistance of 2 or more helpers is required for 

the patient to complete the  


    activity.


If activity was not attempted, code reason:


7-Patient Refused.


9-Not Applicable-not attempted and the patient did not perform the activity 

before the current illness, exacerbation or injury.


10-Not Attempted due to Environmental Limitations-(lack of equipment, weather 

restraints, etc.).


88-Not Attempted due to Medical Conditions or Safety Concerns.


On/Off Footwear:  3


Toileting Hygiene (QC):  4





Other Treatment


Pt able to bring feet up to don socks, due to long toe nails assist given to 

place sock over R toes then pt could don by self then pt able to don L sock with

assist only to keep toe nails from snagging sock.  CGA for pt to transfer from 

bed to recliner using FWW.  Pt stood to cleanse sj area/buttocks with bath 

pack, CGA.  After session, pt sitting in recliner with call light/phone in 

reach.  All needs met in room.





OT Long Term Goals


Long Term Goals


Time Frame:  Nov 5, 2021


Eating (QC):  6


Oral Hygiene (QC):  6


Toileting Hygiene (QC):  6


Shower/Bathe Self (QC):  6


Upper Body Dressing (QC):  6


Lower Body Dressing (QC):  6


On/Off Footwear (QC):  6


Additional Goals:  1-Demonstrate ADL Tasks, 2-Verbalize Understanding, 3-

ImproveStrength/[place]


1=Demonstrate adherence to instructed precautions during ADL tasks.


2=Patient will verbalize/demonstrate understanding of assistive 

devices/modifications for ADL.


3=Patient will improve strength/tolerance for activity to enable patient to 

perform ADL's.





OT Education/Plan


Problem List/Assessment


Assessment:  Decreased Activ Tolerance





Discharge Recommendations


Plan/Recommendations:  Continue POC





Treatment Plan/Plan of Care


Patient would benefit from OT for education, treatment and training to promote 

independence in ADL's, mobility, safety and/or upper extremity function for 

ADL's.


Plan of Care:  ADL Retraining, Functional Mobility, UE Funct Exercise/Act


Treatment Duration:  Nov 5, 2021


Frequency:  5 times per week


Estimated Hrs Per Day:  1.5 hours per day


Agreement:  Yes


Rehab Potential:  Fair





Time/GCodes


Start Time:  11:25


Stop Time:  11:40


Total Time Billed (hr/min):  15


Billed Treatment Time


1 visit-FA 1 (15 min)











SKYLA LOZANO               Oct 27, 2021 12:45

## 2021-10-27 NOTE — PROGRESS NOTE
Subjective


Subjective/Events-last exam


Patient states that she is feeling better. Tolerating PO diet. States that she 

walked the halls last night and felt weak.


Review of Systems


Pulmonary:  No Dyspnea, No Cough


Cardiovascular:  No: Chest Pain, Palpitations


Gastrointestinal:  No: Nausea, Vomiting, Abdominal Pain, Diarrhea, Constipation


Neurological:  Weakness, Incoordination





Focused Exam


Lactate Level


10/25/21 06:15: Lactic Acid Level 5.09*H


10/25/21 08:41: Lactic Acid Level 1.45


Time of Focused Exam:  07:26





Objective


Exam


Last Set of Vital Signs





Vital Signs








  Date Time  Temp Pulse Resp B/P (MAP) Pulse Ox O2 Delivery O2 Flow Rate FiO2


 


10/27/21 09:00  80 20 131/80 (97) 95 Room Air  


 


10/26/21 23:46 36.1       


 


10/26/21 01:00       1.00 





Capillary Refill : Less Than 3 Seconds


I&O











Intake and Output 


 


 10/27/21





 00:00


 


Intake Total 5205 ml


 


Output Total 1475 ml


 


Balance 3730 ml


 


 


 


Intake Oral 1525 ml


 


IV Total 3680 ml


 


Output Urine Total 1475 ml


 


# Voids 2


 


# Bowel Movements 4








General:  Alert, Oriented X3, Cooperative, No Acute Distress


HEENT:  Mucous Memb Moist/Pink


Lungs:  Clear to Auscultation, Normal Air Movement


Heart:  Other (irregularly iregular rhthym, systolic murmur)


Abdomen:  Soft, No Tenderness, No Masses


Extremities:  Other (2+ pitting edema)


Skin:  No Rashes


Neuro:  Normal Speech, Sensation Intact, Cranial Nerves 3-12 NL


Psych/Mental Status:  Mental Status NL, Mood NL





Results/Procedures


Lab


Laboratory Tests


10/27/21 03:45: 


White Blood Count 11.4H, Red Blood Count 3.67L, Hemoglobin 10.4L, Hematocrit 33L

, Mean Corpuscular Volume 89, Mean Corpuscular Hemoglobin 28, Mean Corpuscular 

Hemoglobin Concent 32, Red Cell Distribution Width 14.6H, Platelet Count 169, 

Mean Platelet Volume 10.3, Immature Granulocyte % (Auto) 1, Neutrophils (%) 

(Auto) 83H, Lymphocytes (%) (Auto) 11L, Monocytes (%) (Auto) 3, Eosinophils (%) 

(Auto) 2, Basophils (%) (Auto) 0, Neutrophils # (Auto) 9.4H, Lymphocytes # 

(Auto) 1.2, Monocytes # (Auto) 0.4, Eosinophils # (Auto) 0.2, Basophils # (Auto)

0.1, Immature Granulocyte # (Auto) 0.1, Neutrophils % (Manual) 84, Lymphocytes %

(Manual) 6, Monocytes % (Manual) 3, Eosinophils % (Manual) 4, Band Neutrophils 

3, Sodium Level 132L, Potassium Level 3.8, Chloride Level 101, Carbon Dioxide 

Level 22, Anion Gap 9, Blood Urea Nitrogen 11, Creatinine 0.66, Estimat 

Glomerular Filtration Rate 88, BUN/Creatinine Ratio 17, Glucose Level 92, 

Calcium Level 8.2L, Corrected Calcium 9.4, Phosphorus Level 2.6, Magnesium Level

2.0, Total Bilirubin 0.6, Aspartate Amino Transf (AST/SGOT) 24, Alanine 

Aminotransferase (ALT/SGPT) 15, Alkaline Phosphatase 59, Total Protein 5.0L, 

Albumin 2.5L





Microbiology


10/25/21 Blood Culture - Preliminary, Resulted


           No growth


10/25/21 Urine Culture - Final, Complete


           3 or more isolates


Radiology


Date of Exam:10/25/21





CT ABDOMEN/PELVIS W








PROCEDURE: CT abdomen and pelvis with contrast.





TECHNIQUE: Multiple contiguous axial images were obtained through


the abdomen and pelvis after administration of intravenous


contrast. Auto Exposure Controls were utilized during the CT exam


to meet ALARA standards for radiation dose reduction. All CT


scans use one or more of the following dose optimizing


techniques: automated exposure control, MA and/or KvP adjustment


based on patient size and exam type or iterative reconstruction.





INDICATION:  Nausea, vomiting, diarrhea and septic shock.





Comparison is made with prior CT from 04/06/2016.





The heart is enlarged. No discrete liver mass is identified.


Gallbladder appears to be unremarkable. No biliary duct


dilatation. The pancreas and spleen are unremarkable. No adrenal


mass is detected. Kidneys are unremarkable. There are extrarenal


pelves bilaterally. No obstructing lesion is identified. Aorta is


tortuous and calcified but nonaneurysmal. No central


retroperitoneal or mesenteric lymphadenopathy is identified.


Bowel loops are normal caliber. Bladder and uterus are


unremarkable. No free fluid or fluid collection is identified.


Mildly prominent lymph nodes in the inguinal regions bilaterally


are noted. No iliac lymphadenopathy is identified.





IMPRESSION:


1. Cardiomegaly.


2. No acute feature in the abdomen or pelvis is identified.





Dictated by: 





  Dictated on workstation # [id number]








Dict:   10/25/21 0817


Trans:   10/25/21 1556


CVB [id number]





Interpreted by:     MONSERRAT HAYS MD


Electronically signed by: MONSERRAT HAYS MD 10/25/21 1556





Assessment/Plan


Assessment/Plan





(1) Septic shock


Status:  Acute


Assessment & Plan:  - Repeat LA pending, continue aggressive IVF's to maintain 

MAP, unclear site of infection, procalcitonin elevated, continue Cefepime


10/26: HDS, blood pressures improving, Continue IV antibiotics, will decrease 

IVFs and monitor blood pressure


10/27: D/c IVFs, encourage PO hydration, continue cefepime





(2) Hyponatremia


Status:  Acute


Assessment & Plan:  - Normal sodium in clinic 6/22/21, will continue to monitor,

likely 2/2 dehydration


10/26: Improving, daily CMPs





(3) Chronic atrial fibrillation


Status:  Chronic


Assessment & Plan:  - Rate controlled, continue OAC





(4) DVT prophylaxis


Status:  Acute


Assessment & Plan:  - OAC














STEVE CRESPO MD               Oct 27, 2021 13:14

## 2021-10-27 NOTE — TELE-ICU PROGRESS NOTE
Subjective


Time Seen by a Provider:  08:45


Subjective/Events-last exam


This virtual visit  was conducted using real time audio/video. 


Thank you for asking us to see this patient for AMS and sepsis of undetermined 

etiology. 


PE: Resting comfortably. VSS O2 sat 95% on RA.


HEENT: No obvious masses, adenopathy or JVD. 


Chest: clear to auscultation. 


CV: RRR S1 S2 No murmur or added sounds. 


Abd: Non-tender. Bowel sounds Y. 


: Unremarkable. [place] 


CNS/psychiatric: Alert and oriented, grossly intact. No obvious focal findings. 


Extremities: I+ edema. Capillary refill < 3 seconds. Buttock wounds.


Skin: unremarkable. 


Results: Elevated WCC 11.4 decreasing. Decreased Hb 10.4, Na 132. No infilts on 

CXR.


A/P: Sppsis: Cont abx.


Available chart/ vitals / labs / Images reviewed.


Video assessment done using teleICU camera, rest of exam as per RN.


Critical Care: critically ill patient. Cont. Abx, Eliquis, consider transfer.


Discussed with ANGELITA Sloan. Asked RN to reach out to eICU if any questions or 

concerns later. Time spent with patient/coordination of care with other health 

professionals (mins): 15





Sepsis Event


Evaluation


Height, Weight, BMI


Height: 0'0.00"


Weight: 0lbs. 0.0oz. [id number]; 28.47 BMI


Method:





Focused Exam


Lactate Level


10/25/21 06:15: Lactic Acid Level 5.09*H


10/25/21 08:41: Lactic Acid Level 1.45


Time of Focused Exam:  07:26





Exam


Exam


Patient acknowledged, consented, and participated in this virtual visit which 

was conducted using real time audio/video


Vital Signs








  Date Time  Temp Pulse Resp B/P (MAP) Pulse Ox O2 Delivery O2 Flow Rate FiO2


 


10/27/21 07:00  77      


 


10/27/21 06:00  62 18 102/68 (79) 94 Room Air  


 


10/27/21 05:13  65      


 


10/27/21 05:00  87 12 128/85 (99) 95 Room Air  


 


10/27/21 04:00  68 14 130/85 (100) 95 Room Air  


 


10/27/21 04:00     96 Room Air  


 


10/27/21 03:00  64 14 93/65 (74) 95 Room Air  


 


10/27/21 02:00  71 17 93/65 (74) 95 Room Air  


 


10/27/21 01:00  62 15 102/65 (77) 95 Room Air  


 


10/27/21 01:00  73      


 


10/27/21 00:00  68 14 106/68 (81) 95 Room Air  


 


10/26/21 23:48     96 Room Air  


 


10/26/21 23:46 36.1 68   98 Room Air  


 


10/26/21 23:00  65 17 92/64 (73) 95 Room Air  


 


10/26/21 22:00  77 23 96/74 (81) 95 Room Air  


 


10/26/21 21:36  65      


 


10/26/21 21:00  80 21 113/75 (88) 94 Room Air  


 


10/26/21 20:04 37.0       


 


10/26/21 20:00  79 24 111/81 (91) 94 Room Air  


 


10/26/21 20:00     96 Room Air  


 


10/26/21 19:34 37.4       


 


10/26/21 19:26 37.3       


 


10/26/21 19:00  81      


 


10/26/21 19:00  87 26 106/72 (83) 95 Room Air  


 


10/26/21 18:10  100 26 124/88 (100) 94 Room Air  


 


10/26/21 17:09  90  124/91 (102) 94 Room Air  


 


10/26/21 16:00  81 27 103/75 (84) 93 Room Air  


 


10/26/21 16:00 36.9       


 


10/26/21 16:00     94 Room Air  


 


10/26/21 15:00  73 26 104/75 (85) 93 Room Air  


 


10/26/21 14:00    120/65 (83)    


 


10/26/21 13:00  76 26 121/87 (98) 93 Room Air  


 


10/26/21 13:00  83      


 


10/26/21 12:00     94 Room Air  


 


10/26/21 12:00  75 25 120/81 (94) 95 Room Air  


 


10/26/21 11:25 36.4       


 


10/26/21 11:00  69 23 113/72 (86) 95 Room Air  


 


10/26/21 10:00  84 23 111/79 (90) 95 Room Air  














I & O 


 


 10/27/21





 06:59


 


Intake Total 3895 ml


 


Output Total 1275 ml


 


Balance 2620 ml








Height & Weight


Height: 0'0.00"


Weight: 0lbs. 0.0oz. [id number]; 28.47 BMI


Method:


General Appearance:  No Apparent Distress


HEENT:  PERRL/EOMI


Neck:  Normal Inspection, Non Tender


Respiratory:  Lungs Clear; No Normal Breath Sounds, No No Accessory Muscle Use; 

Respiratory Distress (Mild to moderate with oxygen saturations 92% on room air.)


Cardiovascular:  No Murmur, Irregularly Irregular


Capillary Refill:  Less Than 3 Seconds


Peripheral Pulses:  2+ Radial Pulses (R), 2+ Radial Pulses (L)


Gastrointestinal:  non tender, soft, no organomegaly


Extremity:  Normal Capillary Refill, Normal Inspection, Normal Range of Motion, 

Non Tender, No Calf Tenderness, No Pedal Edema


Neurologic/Psychiatric:  Alert, Oriented x3


Skin:  Normal Color, Warm/Dry, Erythema





Results


Lab


Laboratory Tests


10/26/21 03:35








10/27/21 03:45











Assessment/Plan


Assessment/Plan


See free text


Critical Care:  Critically Ill Patient











SUNITA ASHBY MD          Oct 27, 2021 09:11

## 2021-10-28 LAB
ALBUMIN SERPL-MCNC: 2.6 GM/DL (ref 3.2–4.5)
ALP SERPL-CCNC: 71 U/L (ref 40–136)
ALT SERPL-CCNC: 15 U/L (ref 0–55)
ANISOCYTOSIS BLD QL SMEAR: SLIGHT
BASOPHILS # BLD AUTO: 0.1 10^3/UL (ref 0–0.1)
BASOPHILS NFR BLD AUTO: 1 % (ref 0–10)
BASOPHILS NFR BLD MANUAL: 0 %
BILIRUB SERPL-MCNC: 0.7 MG/DL (ref 0.1–1)
BUN/CREAT SERPL: 14
CALCIUM SERPL-MCNC: 8.5 MG/DL (ref 8.5–10.1)
CHLORIDE SERPL-SCNC: 99 MMOL/L (ref 98–107)
CO2 SERPL-SCNC: 24 MMOL/L (ref 21–32)
CREAT SERPL-MCNC: 0.57 MG/DL (ref 0.6–1.3)
EOSINOPHIL # BLD AUTO: 0.4 10^3/UL (ref 0–0.3)
EOSINOPHIL NFR BLD AUTO: 4 % (ref 0–10)
EOSINOPHIL NFR BLD MANUAL: 0 %
GFR SERPLBLD BASED ON 1.73 SQ M-ARVRAT: 104 ML/MIN
GLUCOSE SERPL-MCNC: 94 MG/DL (ref 70–105)
HCT VFR BLD CALC: 33 % (ref 35–52)
HGB BLD-MCNC: 10.9 G/DL (ref 11.5–16)
LYMPHOCYTES # BLD AUTO: 1.3 10^3/UL (ref 1–4)
LYMPHOCYTES NFR BLD AUTO: 16 % (ref 12–44)
MAGNESIUM SERPL-MCNC: 1.5 MG/DL (ref 1.6–2.4)
MCH RBC QN AUTO: 29 PG (ref 25–34)
MCHC RBC AUTO-ENTMCNC: 33 G/DL (ref 32–36)
MCV RBC AUTO: 89 FL (ref 80–99)
MONOCYTES # BLD AUTO: 0.5 10^3/UL (ref 0–1)
MONOCYTES NFR BLD AUTO: 5 % (ref 0–12)
MONOCYTES NFR BLD: 4 %
NEUTROPHILS # BLD AUTO: 6.4 10^3/UL (ref 1.8–7.8)
NEUTROPHILS NFR BLD AUTO: 74 % (ref 42–75)
NEUTS BAND NFR BLD MANUAL: 87 %
NEUTS BAND NFR BLD: 1 %
PHOSPHATE SERPL-MCNC: 2.9 MG/DL (ref 2.3–4.7)
PLATELET # BLD: 181 10^3/UL (ref 130–400)
PMV BLD AUTO: 10 FL (ref 9–12.2)
POTASSIUM SERPL-SCNC: 3.8 MMOL/L (ref 3.6–5)
PROT SERPL-MCNC: 5.3 GM/DL (ref 6.4–8.2)
SODIUM SERPL-SCNC: 133 MMOL/L (ref 135–145)
VARIANT LYMPHS NFR BLD MANUAL: 8 %
WBC # BLD AUTO: 8.6 10^3/UL (ref 4.3–11)

## 2021-10-28 RX ADMIN — SODIUM CHLORIDE SCH MLS/HR: 900 INJECTION INTRAVENOUS at 04:17

## 2021-10-28 RX ADMIN — SODIUM CHLORIDE SCH MLS/HR: 900 INJECTION INTRAVENOUS at 13:08

## 2021-10-28 RX ADMIN — APIXABAN SCH MG: 5 TABLET, FILM COATED ORAL at 09:00

## 2021-10-28 RX ADMIN — Medication SCH ML: at 04:18

## 2021-10-28 RX ADMIN — NYSTATIN SCH GM: 100000 CREAM TOPICAL at 09:01

## 2021-10-28 RX ADMIN — MICONAZOLE NITRATE SCH APPLIC: 20 POWDER TOPICAL at 09:01

## 2021-10-28 NOTE — PHYSICAL THERAPY DAILY NOTE
PT Daily Note-Current


Subjective


Patient states, "I hope I get to go home today."  Family present.





Mental Status


Patient Orientation:  Person, Time, Situation





Transfers


SCALE: Activities may be completed with or without assistive devices.





6-Indepedent-patient completes the activity by him/herself with no assistance 

from a helper.


5-Set-up or Clean-up Assistance-helper sets up or cleans up; patient completes a

ctivity. [place] assists only prior to or  


    following the activity.


4-Supervision or Touching Assistance-helper provides verbal cues and/or 

touching/steadying and/or contact guard assistance as patient completes 

activity. Assistance may be provided   


    throughout the activity or intermittently.


3-Partial/Moderate Assistance-helper does LESS THAN HALF the effort. [place] 

lifts, holds or supports trunk or limbs, but provides less than half the effort.


2-Substantial/Maximal Assistance-helper does MORE THAN HALF the effort. [place] 

lifts or holds trunk or limbs and provides more than half the effort.


3-Yduissahb-xrvaal does ALL the effort. Patient does none of the effort to 

complete the activity. Or, the assistance of 2 or more helpers is required for 

the patient to complete the  


    activity.


If activity was not attempted, code reason:


7-Patient Refused.


9-Not Applicable-not attempted and the patient did not perform the activity 

before the current illness, exacerbation or injury.


10-Not Attempted due to Environmental Limitations-(lack of equipment, weather 

restraints, etc.).


88-Not Attempted due to Medical Conditions or Safety Concerns.


Lying to Sitting/Side of Bed(Q:  6


Sit to Stand (QC):  6


Chair/Bed-to-Chair Xfer(QC):  6





Gait Training


Does the Patient Walk?:  Yes


Distance:  400'


Walk 10 feet (QC):  5


Walk 50 ft with 2 Turns(QC):  5


Walk 150 ft (QC):  5


Gait Assistive Device:  FWW


safe and functional with no deviation





Assessment


Current Status:  Excellent Progress


Patient feels she is at PLOF with mobility and continues to report she desires 

to return to home.  PT to increase activity as tolerated by patient.





PT Long Term Goals


Long Term Goals


PT Long Term Goals Time Frame:  Nov 6, 2021


Roll Left & Right (QC):  6


Sit to Lying (QC):  6


Lying-Sitting on Side/Bed(QC):  6


Sit to Stand (QC):  6


Chair/Bed-to-Chair Xfer(QC):  6


Toilet Transfer (QC):  6


Walk 10 feet (QC):  6


Walk 50ft with 2 Turns (QC):  6


Walk 150 ft (QC):  6





PT Plan


Treatment/Plan


Treatment Plan:  Continue Plan of Care


Treatment Plan:  Bed Mobility, Education, Functional Activity [place], Functional 

Strength, Gait, Safety, Therapeutic Exercise, Transfers


Treatment Duration:  Nov 6, 2021


Frequency:  6 times per week


Estimated Hrs Per Day:  .25 hour per day


Patient and/or Family Agrees t:  Yes





Time/GCodes


Time In:  838


Time Out:  848


Total Billed Treatment Time:  10


Total Billed Treatment


1 visit


FA 10 min











MOE AUGUSTIN PT              Oct 28, 2021 10:15

## 2021-10-28 NOTE — DISCHARGE SUMMARY
Diagnosis/Chief Complaint


Date of Admission


Oct 25, 2021 at 09:00


Date of Discharge


10/28/21


Admission Diagnosis


Admission Diagnosis


See problem list





Discharge Diagnosis


See below


Problems/Diagnosis:  


(1) Septic shock


Assessment & Plan:  - Repeat LA pending, continue aggressive IVF's to maintain 

MAP, unclear site of infection, procalcitonin elevated, continue Cefepime


10/26: HDS, blood pressures improving, Continue IV antibiotics, will decrease 

IVFs and monitor blood pressure


10/27: D/c IVFs, encourage PO hydration, continue cefepime


10/28: HDS off IVFs, PO intake improved, will transition to PO antibiotics and 

d/c home, encouraged family to consider HH and they are declining HH at this 

time


Status:  Resolved


Resolution Date/Time:  10/27/21 @ 13:36


(2) Hyponatremia


Assessment & Plan:  - Normal sodium in clinic 6/22/21, will continue to monitor,

likely 2/2 dehydration


10/26: Improving, daily CMPs


10/28: Improved at d/c


Status:  Acute


(3) Chronic atrial fibrillation


Assessment & Plan:  - Rate controlled, continue OAC


Status:  Chronic


(4) DVT prophylaxis


Assessment & Plan:  - OAC


Status:  Acute





Chief Complaint/HPI


Chief Complaint/HPI


70 yo F that presented with altered mental status, N/V and abdominal pain. 

States that she woke up and felt like she was going to vomit. Symptoms started 

abruptly this AM. Denies any chest pain or shortness of breath. States that she 

has had a normal appetite. She has been feeling some chills this AM. She took 

all her medications this AM. Denies any other sick contacts.





Discharge Summary-Simple/Stand


Consultations





Discharge Physical Examination


Allergies:  


Coded Allergies:  


     Penicillins (Verified  Allergy, Unknown, 4/11/20)


Vitals & I&Os





Vital Sign - Last 12Hours








  Date Time  Temp Pulse Resp B/P (MAP) Pulse Ox O2 Delivery O2 Flow Rate FiO2


 


10/28/21 08:00 36.4 74 20 170/84 94 Room Air  


 


10/26/21 01:00       1.00 














Intake and Output 


 


 10/28/21





 00:00


 


Intake Total 2005 ml


 


Output Total 1050 ml


 


Balance 955 ml








General Appearance:  Alert, Oriented X3, Cooperative, No Acute Distress


Respiratory:  Clear to Auscultation, Normal Air Movement


Cardiovascular:  Other (irregularly irregular rate, systolic murmur)


Abdominal:  Normal Bowel Sounds, Soft, No Tenderness, No Masses


Extremities:  Other (2+ pitting edema R slightly larger the L)


Skin:  Other (dependent rashes on periuneium and buttock)


Neuro:  Normal Speech, Sensation Intact, Cranial Nerves 3-12 NL


Psych/Mental Status:  Mental Status NL, Mood NL





Hospital Course


Was the Problem List Reviewed?:  Yes


See final discharge diagnosis.


Radiology Reviewed


Date of Exam:10/25/21





CT ABDOMEN/PELVIS W








PROCEDURE: CT abdomen and pelvis with contrast.





TECHNIQUE: Multiple contiguous axial images were obtained through


the abdomen and pelvis after administration of intravenous


contrast. Auto Exposure Controls were utilized during the CT exam


to meet ALARA standards for radiation dose reduction. All CT


scans use one or more of the following dose optimizing


techniques: automated exposure control, MA and/or KvP adjustment


based on patient size and exam type or iterative reconstruction.





INDICATION:  Nausea, vomiting, diarrhea and septic shock.





Comparison is made with prior CT from 04/06/2016.





The heart is enlarged. No discrete liver mass is identified.


Gallbladder appears to be unremarkable. No biliary duct


dilatation. The pancreas and spleen are unremarkable. No adrenal


mass is detected. Kidneys are unremarkable. There are extrarenal


pelves bilaterally. No obstructing lesion is identified. Aorta is


tortuous and calcified but nonaneurysmal. No central


retroperitoneal or mesenteric lymphadenopathy is identified.


Bowel loops are normal caliber. Bladder and uterus are


unremarkable. No free fluid or fluid collection is identified.


Mildly prominent lymph nodes in the inguinal regions bilaterally


are noted. No iliac lymphadenopathy is identified.





IMPRESSION:


1. Cardiomegaly.


2. No acute feature in the abdomen or pelvis is identified.





Dictated by: 





  Dictated on workstation # [id number]








Dict:   10/25/21 [id number]


Trans:   10/25/21 [id number]


[facility] [phone]





Interpreted by:     MONSERRAT HAYS MD


Electronically signed by: MONSERRAT HAYS MD 10/25/21 [id number]





Discussion & Recommendations


70 yo F that presented to ER with septic shock. Patient was started on 

aggressive IVF protocol, broad spectrum antibiotics and monitored in the ICU. 

Patient continued to improve and PO intake improved. Encouraged patient and 

family to consider home with HH and they declined further services at this time.

Will d/c home today with close f.u with PCP.





Discharge


Condition at discharge


Stable


Instructions to patient/family


Please see electronic discharge instructions given to patient.


Discharge Medications


Reviewed and agree with Discharge Medication list on patient's Discharge 

Instruction sheet





Copy


Copies To 1:   STEVE CRESPO MD, HOLLY R MD               Oct 28, 2021 12:23

## 2021-10-28 NOTE — OCCUPATIONAL THER DAILY NOTE
OT Current Status-Daily Note


Subjective


Pt was getting out of shower upon OT arrival. Pt agreed to OT tx session on this

date.





Mental Status/Objective


Patient Orientation:  Person, Place, Time, Situation





ADL-Treatment


Therapy Code Descriptions/Definitions 





Functional [place] Measure:


0=Not Assessed/NA        4=Minimal Assistance


1=Total Assistance        5=Supervision or Setup


2=Maximal Assistance  6=Modified [place]


3=Moderate Assistance 7=Complete IndependenceSCALE: Activities may be completed 

with or without assistive devices.





6-Indepedent-patient completes the activity by him/herself with no assistance 

from a helper.


5-Set-up or Clean-up Assistance-helper sets up or cleans up; patient completes 

activity. [place] assists only prior to or  


    following the activity.


4-Supervision or Touching Assistance-helper provides verbal cues and/or 

touching/steadying and/or contact guard assistance as patient completes 

activity. Assistance may be provided   


    throughout the activity or intermittently.


3-Partial/Moderate Assistance-helper does LESS THAN HALF the effort. [place] 

lifts, holds or supports trunk or limbs, but provides less than half the effort.


2-Substantial/Maximal Assistance-helper does MORE THAN HALF the effort. [place] 

lifts or holds trunk or limbs and provides more than half the effort.


5-[place]vy[place]urn-ttqzym does ALL the effort. Patient does none of the effort to 

complete the activity. Or, the assistance of 2 or more helpers is required for 

the patient to complete the  


    activity.


If activity was not attempted, code reason:


7-Patient Refused.


9-Not Applicable-not attempted and the patient did not perform the activity 

before the current illness, exacerbation or injury.


10-Not Attempted due to Environmental Limitations-(lack of equipment, weather 

restraints, etc.).


88-Not Attempted due to Medical Conditions or Safety Concerns.


Shower/Bathe Self (QC):  5 (Pt required set up, per nursing staff. )


Upper Body Dressing (QC):  5 (Pt required set up w/ hospital gown. )





Other Treatment


Pt was getting out of shower upon OT arrival. Pt agreed to OT tx session on this

date. Pt performed functional mobility from SC to chair, SBA for safety without 

AD. Pt transferred stand to sit in chair, SBA. Pt then performed grooming task 

at Min assist of brushing hair while seated for energy conservation purposes. Pt

answered phone call from her son, declines further OT services at this time. 

Post tx session, pt was seated in chair, legs elevated, call light within reach,

and all needs met.





Education


OT Patient Education:  Energy conservation, Progress toward Goal/Update tx plan,

Purpose of tx/functional activities


Teaching Recipient:  Patient


Teaching Methods:  Discussion


Response to Teaching:  Verbalize Understanding





OT Long Term Goals


Long Term Goals


Time Frame:  Nov 5, 2021


Eating (QC):  6


Oral Hygiene (QC):  6


Toileting Hygiene (QC):  6


Shower/Bathe Self (QC):  6


Upper Body Dressing (QC):  6


Lower Body Dressing (QC):  6


On/Off Footwear (QC):  6


Additional Goals:  1-Demonstrate ADL Tasks, 2-Verbalize Understanding, 3-

ImproveStrength/[place]


1=Demonstrate adherence to instructed precautions during ADL tasks.


2=Patient will verbalize/demonstrate understanding of assistive 

devices/modifications for ADL.


3=Patient will improve strength/tolerance for activity to enable patient to 

perform ADL's.





OT Education/Plan


Problem List/Assessment


Assessment:  Decreased Activ Tolerance, Impaired I ADL's, Impaired Self-Care 

Skills, Restricted Funct UE ROM





Discharge Recommendations


Plan/Recommendations:  Continue POC





Treatment Plan/Plan of Care


Patient would benefit from OT for education, treatment and training to promote 

independence in ADL's, mobility, safety and/or upper extremity function for 

ADL's.


Plan of Care:  ADL Retraining, Functional Mobility, UE Funct Exercise/Act


Treatment Duration:  Nov 5, 2021


Frequency:  5 times per week


Estimated Hrs Per Day:  1.5 hours per day


Agreement:  Yes


Rehab Potential:  Fair





Time/GCodes


Start Time:  10:20


Stop Time:  10:33


Total Time Billed (hr/min):  13


Billed Treatment Time


1 Visit, ADL











PUJA BURLESON OT          Oct 28, 2021 11:04

## 2021-10-28 NOTE — DISCHARGE SUMMARY
Discharge [facility]


Reconcile Patient Problems


Problems Reviewed?:  Yes





Discharge Medications


New, Converted or Re-Newed RX:  Transmitted to Pharmacy


New Medications:  


Cefdinir (Cefdinir) 300 Mg Capsule


300 MG PO BID for 3 Days, #6 CAP





Fluconazole (Diflucan) 150 Mg Tablet


150 MG PO q 72 hrs, #3 TAB





Miconazole Nitrate (Lotrimin AF) 90 Gm Powder


0 GM TOP BID, #100 GM





 


Continued Medications:  


Acetaminophen (Tylenol) 325 Mg Capsule


325-650 MG PO Q8H PRN for PAIN-MILD (1-4), CAP





Amlodipine Besylate (Amlodipine Besylate) 5 Mg Tablet


5 MG PO DAILY, TAB





Apixaban (Eliquis) 5 Mg Tablet


5 MG PO Q12H, TAB





Fluoxetine HCl (Fluoxetine HCl) 20 Mg Capsule


20 MG PO DAILY, CAP





Spironolactone (Spironolactone) 25 Mg Tablet


25 MG PO DAILY, TAB





 


Discontinued Medications:  


Diphenhydramine HCl (Benadryl Allergy) 25 Mg Tablet


25-50 MG PO Q8H PRN for ALLERGY SYMPTOMS, TAB





Meloxicam (Meloxicam) 7.5 Mg Tablet


7.5 MG PO DAILY, TAB











Patient Instructions


Goal/Follow Up Appt:  


2 weeks with Edwin


Patient Instructions:  


- Discussed the importance of keeping perineum dry, Use bed side commode





Activity & Diet


Discharge Diet:  Cardiac Diet


Activity as Tolerated:  Yes





Copy


Copies To 1:   STEVE CRESPO MD, HOLLY R MD               Oct 28, 2021 12:28

## 2021-10-29 NOTE — PHYSICIAN QUERY CLARIFICATION
PQ-Conflicting Diagnosis


Admission/Discharge


Admission Date: Oct 25, 2021 at 09:00 


Discharge Date:  Oct 28, 2021 at 13:40








The medical record reflects the following clinical scenario:





History/Risk Factors:


septic shock, SOB, wheezing





Clinical Findings:


Respiratory Distress (Mild to moderate with oxygen saturations 92% on room air.)





Treatment:


Oxygen





Question:


Do you agree with the impression of Acute respiratory failure per ED record. 


Please document a response in Progress Note or Discharge Summary.





   1. Yes





   2. No





   3. Other, with explanation of clinical findings





   4. Clinically undetermined, no explanation for clinical findings.


Please remember a lack of response to the above will prompt a phone page by 

CDI/Coding staff. 





In responding to this query, please exercise your independent professional 

judgment.  The purpose of this communication is to more accurately reflect the 

complexity of your patients condition. The fact that a question is asked does 

not imply that any particular answer is desired or expected.  





Thank you for your timely response to this clarification.      


   


Requestors name: Lennie   





Phone # [phone]








THIS PHYSICIAN QUERY FORM IS A PERMANENT PART OF THE MEDICAL RECORD











LENNIE KHAN                Oct 29, 2021 14:28

## 2022-06-03 ENCOUNTER — HOSPITAL ENCOUNTER (EMERGENCY)
Dept: HOSPITAL 75 - ER | Age: 72
LOS: 1 days | Discharge: HOME | End: 2022-06-04
Payer: MEDICARE

## 2022-06-03 DIAGNOSIS — F10.229: ICD-10-CM

## 2022-06-03 DIAGNOSIS — S16.1XXA: Primary | ICD-10-CM

## 2022-06-03 DIAGNOSIS — F17.210: ICD-10-CM

## 2022-06-03 DIAGNOSIS — Z20.822: ICD-10-CM

## 2022-06-03 DIAGNOSIS — N39.0: ICD-10-CM

## 2022-06-03 DIAGNOSIS — E87.8: ICD-10-CM

## 2022-06-03 DIAGNOSIS — F17.220: ICD-10-CM

## 2022-06-03 DIAGNOSIS — S09.90XA: ICD-10-CM
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
## 2022-06-03 DIAGNOSIS — I48.91: ICD-10-CM

## 2022-06-03 DIAGNOSIS — Z28.310: ICD-10-CM
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
## 2022-06-03 DIAGNOSIS — Z79.01: ICD-10-CM

## 2022-06-03 LAB
ALBUMIN SERPL-MCNC: 4 GM/DL (ref 3.2–4.5)
ALP SERPL-CCNC: 100 U/L (ref 40–136)
ALT SERPL-CCNC: 16 U/L (ref 0–55)
APTT BLD: 37 SEC (ref 24–35)
APTT PPP: YELLOW S
BACTERIA #/AREA URNS HPF: (no result) /HPF
BARBITURATES UR QL: NEGATIVE
BASOPHILS # BLD AUTO: 0.1 10^3/UL (ref 0–0.1)
BASOPHILS NFR BLD AUTO: 1 % (ref 0–10)
BENZODIAZ UR QL SCN: NEGATIVE
BILIRUB SERPL-MCNC: 0.4 MG/DL (ref 0.1–1)
BILIRUB UR QL STRIP: NEGATIVE
BUN/CREAT SERPL: 7
CALCIUM SERPL-MCNC: 8.4 MG/DL (ref 8.5–10.1)
CHLORIDE SERPL-SCNC: 96 MMOL/L (ref 98–107)
CO2 SERPL-SCNC: 17 MMOL/L (ref 21–32)
COCAINE UR QL: NEGATIVE
CREAT SERPL-MCNC: 0.67 MG/DL (ref 0.6–1.3)
EOSINOPHIL # BLD AUTO: 0.4 10^3/UL (ref 0–0.3)
EOSINOPHIL NFR BLD AUTO: 4 % (ref 0–10)
FIBRINOGEN PPP-MCNC: CLEAR MG/DL
GFR SERPLBLD BASED ON 1.73 SQ M-ARVRAT: 92 ML/MIN
GLUCOSE SERPL-MCNC: 102 MG/DL (ref 70–105)
GLUCOSE UR STRIP-MCNC: NEGATIVE MG/DL
HCT VFR BLD CALC: 40 % (ref 35–52)
HGB BLD-MCNC: 13.3 G/DL (ref 11.5–16)
INR PPP: 1.1 (ref 0.8–1.4)
KETONES UR QL STRIP: NEGATIVE
LEUKOCYTE ESTERASE UR QL STRIP: (no result)
LYMPHOCYTES # BLD AUTO: 3 10^3/UL (ref 1–4)
LYMPHOCYTES NFR BLD AUTO: 34 % (ref 12–44)
MANUAL DIFFERENTIAL PERFORMED BLD QL: NO
MCH RBC QN AUTO: 28 PG (ref 25–34)
MCHC RBC AUTO-ENTMCNC: 33 G/DL (ref 32–36)
MCV RBC AUTO: 85 FL (ref 80–99)
METHADONE UR QL SCN: NEGATIVE
MONOCYTES # BLD AUTO: 0.7 10^3/UL (ref 0–1)
MONOCYTES NFR BLD AUTO: 7 % (ref 0–12)
NEUTROPHILS # BLD AUTO: 4.7 10^3/UL (ref 1.8–7.8)
NEUTROPHILS NFR BLD AUTO: 53 % (ref 42–75)
NITRITE UR QL STRIP: NEGATIVE
OPIATES UR QL SCN: NEGATIVE
OXYCODONE UR QL: NEGATIVE
PH UR STRIP: 6 [PH] (ref 5–9)
PLATELET # BLD: 241 10^3/UL (ref 130–400)
PMV BLD AUTO: 9.4 FL (ref 9–12.2)
POTASSIUM SERPL-SCNC: 3.2 MMOL/L (ref 3.6–5)
PROPOXYPH UR QL: NEGATIVE
PROT SERPL-MCNC: 7.2 GM/DL (ref 6.4–8.2)
PROT UR QL STRIP: NEGATIVE
PROTHROMBIN TIME: 14.4 SEC (ref 12.2–14.7)
RBC #/AREA URNS HPF: (no result) /HPF
SODIUM SERPL-SCNC: 130 MMOL/L (ref 135–145)
SP GR UR STRIP: <=1.005 (ref 1.02–1.02)
SQUAMOUS #/AREA URNS HPF: (no result) /HPF
TRICYCLICS UR QL SCN: NEGATIVE
WBC # BLD AUTO: 8.8 10^3/UL (ref 4.3–11)
WBC #/AREA URNS HPF: (no result) /HPF

## 2022-06-03 PROCEDURE — 72125 CT NECK SPINE W/O DYE: CPT

## 2022-06-03 PROCEDURE — 85610 PROTHROMBIN TIME: CPT

## 2022-06-03 PROCEDURE — 80053 COMPREHEN METABOLIC PANEL: CPT

## 2022-06-03 PROCEDURE — 85025 COMPLETE CBC W/AUTO DIFF WBC: CPT

## 2022-06-03 PROCEDURE — 85730 THROMBOPLASTIN TIME PARTIAL: CPT

## 2022-06-03 PROCEDURE — 80320 DRUG SCREEN QUANTALCOHOLS: CPT

## 2022-06-03 PROCEDURE — 93041 RHYTHM ECG TRACING: CPT

## 2022-06-03 PROCEDURE — 70450 CT HEAD/BRAIN W/O DYE: CPT

## 2022-06-03 PROCEDURE — 99284 EMERGENCY DEPT VISIT MOD MDM: CPT

## 2022-06-03 PROCEDURE — 81000 URINALYSIS NONAUTO W/SCOPE: CPT

## 2022-06-03 PROCEDURE — 36415 COLL VENOUS BLD VENIPUNCTURE: CPT

## 2022-06-03 PROCEDURE — 80306 DRUG TEST PRSMV INSTRMNT: CPT

## 2022-06-03 PROCEDURE — 87636 SARSCOV2 & INF A&B AMP PRB: CPT

## 2022-06-03 PROCEDURE — 87088 URINE BACTERIA CULTURE: CPT

## 2022-06-03 NOTE — DIAGNOSTIC IMAGING REPORT
PROCEDURE: CT head and CT cervical spine without contrast.



TECHNIQUE: Multiple contiguous axial images were obtained through

the brain and cervical spine without the use of intravenous

contrast. Sagittal and coronal reformations through the cervical

spine were then performed. Auto Exposure Controls were utilized

during the CT exam to meet ALARA standards for radiation dose

reduction. 



INDICATION: Head trauma.



CT HEAD: The ventricles are normal in size, shape and position.

There are no masses or hemorrhages. There are no extra-axial

fluid collections. There are no skull fractures seen.



IMPRESSION: Mild senescent changes. No acute abnormality is seen.



CT CERVICAL SPINE: Odontoid is intact. Atlantoaxial and

basicervical relationships appear normal. Vertebral body

alignment is normal. There are degenerative changes of the

atlantoaxial joint. There is degenerative disc change at C5-C6.

Posterior elements are unremarkable. There are no fractures.



IMPRESSION: Degenerative changes of the cervical spine. No acute

abnormality is seen.



Dictated by: 



  Dictated on workstation # [id number]

## 2022-06-03 NOTE — ED FALL/INJURY
General


Chief Complaint:  Trauma-Non Activation


Stated Complaint:  FALL/HIT HEAD/ON BLOOD THINNER


Source:  patient (PT IS INTOXICATED AND LIMITED HISTORIAN. ), other (YOUNG 

FEMALE WITH PT CAN GIVE NO SIGNFICANT INFORMATION)





History of Present Illness


Date Seen by Provider:  Da 3, 2022


Time Seen by Provider:  21:40


Initial Comments


PT ARRIVES VIA POV WITH YOUNG FEMALE--PT REQUIRES 1 TO 2 PERSON ASSIST TO WALK


PT IS INTOXICATED--ADMITS TO "6 BEERS" TONIGHT AT A PARTY. NORMALLY DRINKS AT 

LEAST 18 BEERS A DAY


AROUND 2000 TONIGHT, SHE WAS OUTSIDE, LOST HER BALANCE AND FELL BACKWARDS, 

HITTING THE BACK OF HER HEAD ON A CHAIR AND THEN WENT TO THE GROUND/GRASS. 


NO LOSS OF CONSCIOUSNESS OR DECREASED MENTATION SINCE HITTING HER HEAD


HAS A LARGE KNOT ON THE BACK OF HER HEAD. 


DENIES NECK OR BACK PAIN 


DENIES ANY OTHER INJURIES OR AREAS OF PAIN 


NO NAUSEA/VOMITING


DENIES HEADACHE








PT STATES SHE IS ON A BLOOD THINNER, BUT DOES NOT KNOW WHAT THE MEDICATION IS OR

WHAT SHE TAKES IT FOR. 


PER MED RECONCILIATION, PT IS PRESCRIBED ELIQUIS, AND PER OLD CHART FROM 2021, 

SHE HAS HISTORY OF ATRIAL FIBRILLATION





PT HAS NOT HAD COVID OR FLU VACCINES





PCP: DR. CRESPO AT [facility]





Allergies and Home Medications


Allergies


Coded Allergies:  


     Penicillins (Verified  Allergy, Unknown, 4/11/20)





Patient Home Medication List


Acetaminophen (Tylenol) 325 Mg Capsule, 325-650 MG PO Q8H PRN for PAIN-MILD (1-

4), (Reported)


   Entered as Reported by: RAMONA OG on 10/25/21 1335


Amlodipine Besylate (Amlodipine Besylate) 5 Mg Tablet, 5 MG PO DAILY, (Reported)


   Entered as Reported by: RAMONA OG on 4/13/20 1031


Apixaban (Eliquis) 5 Mg Tablet, 5 MG PO Q12H, (Reported)


   Entered as Reported by: RAMONA OG on 10/25/21 1335


Cefdinir (Cefdinir) 300 Mg Capsule, 300 MG PO BID


   Prescribed by: STEVE CRESPO on 10/28/21 1226


Fluconazole (Diflucan) 150 Mg Tablet, 150 MG PO q 72 hrs


   Prescribed by: STEVE CRESPO on 10/28/21 1226


Fluoxetine HCl (Fluoxetine HCl) 20 Mg Capsule, 20 MG PO DAILY, (Reported)


   Entered as Reported by: RAMONA OG on 10/25/21 1335


Miconazole Nitrate (Lotrimin AF) 90 Gm Powder, 0 GM TOP BID


   Prescribed by: STEVE CRESPO on 10/28/21 1226


Spironolactone (Spironolactone) 25 Mg Tablet, 25 MG PO DAILY, (Reported)


   Entered as Reported by: RAMONA OG on 10/25/21 1335





Review of Systems


Review of Systems


Constitutional:  see HPI


Eyes:  No Symptoms Reported


Ears, Nose, Mouth, Throat:  no symptoms reported


Respiratory:  no symptoms reported


Cardiovascular:  no symptoms reported


Gastrointestinal:  no symptoms reported


Genitourinary:  no symptoms reported


Musculoskeletal:  no symptoms reported


Skin:  no symptoms reported


Psychiatric/Neurological:  See HPI





Past Medical-Social-Family Hx


Patient Social History


Tobacco Use?:  Yes


Tobacco type used:  Cigarettes


Smoking Status:  Current Everyday Smoker


Smokeless Tobacco Frequency:  Current Everyday User


Substance use?:  No


Alcohol Use?:  Yes


Alcohol type:  Beer


Alcohol Frequency:  Daily


Pt feels they are or have been:  No





Immunizations Up To Date


Influenza Vaccine Up-to-Date:  Yes; Up-to-Date





Seasonal Allergies


Seasonal Allergies:  No





Past Medical History


Surgeries:  Yes (EGD and Colonoscopy)


Respiratory:  Yes


Chronic Bronchitis


Currently Using CPAP:  No


Currently Using BIPAP:  No


Cardiac:  Yes


Atrial Fibrillation, Chronic Edema/Swelling, Hypertension


Neurological:  No


Reproductive Disorders:  No


Female Reproductive Disorders:  Denies


GYN History:  Menopausal


Sexually Transmitted Disease:  No


HIV/AIDS:  No


Genitourinary:  No


Gastrointestinal:  No


Musculoskeletal:  Yes


Arthritis, Chronic Back Pain


Endocrine:  Yes


Diabetes, Non-Insulin dep


HEENT:  No


Loss of Vision:  Denies


Hearing Impairment:  Denies


Cancer:  No


Psychosocial:  Yes


Anxiety, Depression


Integumentary:  No


Blood Disorders:  No


Adverse Reaction/Blood Tranf:  No





Family Medical History


Heart Disease, Diabetes, Stroke, Other Conditions/Hx








SOCIAL HISTORY:


-SMOKES > 1 PPD, PLUS CHEWS TOBACCO


-DRINKS > 18 BEERS/DAY


-DENIES DRUG USE





Physical Exam


Vital Signs





Vital Signs - First Documented








 6/3/22





 21:42


 


Temp 36.2


 


Pulse 79


 


Resp 18


 


B/P (MAP) 133/94 (107)





Capillary Refill :


Height, Weight, BMI


Height: 0'0.00"


Weight: 0lbs. 0.0oz. [id number]; 28.47 BMI


Method:


General Appearance:  other (PT WITH VERY UNSTEADY GAIT--1-2 PERSON ASSIST, 

APPEARS INTOXICATED, WITH SLURRED SPEECH AND REEKS OF ALCOHOL. )


HEENT:  PERRL/EOMI, TMs normal, pharynx normal, other (LARGE HEMATOMA TO 

OCCIPUT. SKIN IS INTACT. )


Neck:  non-tender, full range of motion, supple, normal inspection


Cardiovascular:  regular rate, rhythm, no murmur


Respiratory:  chest non-tender, normal breath sounds, no respiratory distress, 

no accessory muscle use


Gastrointestinal:  non tender, soft


Back:  normal inspection, no CVA tenderness, no vertebral tenderness


Extremities:  normal range of motion, non-tender, normal inspection, normal 

capillary refill, pedal edema (2+ EDEMA BILATERALLY)


Neurologic/Psychiatric:  CNs II-XII nml as tested, no motor/sensory deficits, 

alert, other (MENTATION, SPEECH AND GAIT AS NOTED ABOVE)


Skin:  normal color, warm/dry





[place] Coma Score


Best Eye Response:  (4) Open Spontaneously


Best Verbal Response:  (5) Oriented


Best Motor Response:  (6) Obeys Commands


[place] Total:  15





Progress/Results/Core Measures


Results/Orders


Lab Results





Laboratory Tests








Test


 6/3/22


11:54 6/3/22


22:25 6/3/22


22:54 Range/Units


 


 


Urine Color YELLOW     


 


Urine Clarity CLEAR     


 


Urine pH 6.0    5-9  


 


Urine Specific Gravity <=1.005    1.016-1.022  


 


Urine Protein NEGATIVE    NEGATIVE  


 


Urine Glucose (UA) NEGATIVE    NEGATIVE  


 


Urine Ketones NEGATIVE    NEGATIVE  


 


Urine Nitrite NEGATIVE    NEGATIVE  


 


Urine Bilirubin NEGATIVE    NEGATIVE  


 


Urine Urobilinogen 0.2    < = 1.0  MG/DL


 


Urine Leukocyte Esterase 1+ H   NEGATIVE  


 


Urine RBC (Auto) TRACE-I H   NEGATIVE  


 


Urine RBC 0-2     /HPF


 


Urine WBC 2-5     /HPF


 


Urine Squamous Epithelial


Cells 0-2 


 


 


  /HPF





 


Urine Crystals NONE     /LPF


 


Urine Bacteria MODERATE H    /HPF


 


Urine Casts NONE     /LPF


 


Urine Mucus SMALL H    /LPF


 


Urine Culture Indicated YES     


 


Urine Opiates Screen NEGATIVE    NEGATIVE  


 


Urine Oxycodone Screen NEGATIVE    NEGATIVE  


 


Urine Methadone Screen NEGATIVE    NEGATIVE  


 


Urine Propoxyphene Screen NEGATIVE    NEGATIVE  


 


Urine Barbiturates Screen NEGATIVE    NEGATIVE  


 


Ur Tricyclic Antidepressants


Screen NEGATIVE 


 


 


 NEGATIVE  





 


Urine Phencyclidine Screen NEGATIVE    NEGATIVE  


 


Urine Amphetamines Screen NEGATIVE    NEGATIVE  


 


Urine Methamphetamines Screen NEGATIVE    NEGATIVE  


 


Urine Benzodiazepines Screen NEGATIVE    NEGATIVE  


 


Urine Cocaine Screen NEGATIVE    NEGATIVE  


 


Urine Cannabinoids Screen NEGATIVE    NEGATIVE  


 


White Blood Count


 


 8.8 


 


 4.3-11.0


10^3/uL


 


Red Blood Count


 


 4.70 


 


 3.80-5.11


10^6/uL


 


Hemoglobin  13.3   11.5-16.0  g/dL


 


Hematocrit  40   35-52  %


 


Mean Corpuscular Volume  85   80-99  fL


 


Mean Corpuscular Hemoglobin  28   25-34  pg


 


Mean Corpuscular Hemoglobin


Concent 


 33 


 


 32-36  g/dL





 


Red Cell Distribution Width  15.4 H  10.0-14.5  %


 


Platelet Count


 


 241 


 


 130-400


10^3/uL


 


Mean Platelet Volume  9.4   9.0-12.2  fL


 


Immature Granulocyte % (Auto)  1    %


 


Neutrophils (%) (Auto)  53   42-75  %


 


Lymphocytes (%) (Auto)  34   12-44  %


 


Monocytes (%) (Auto)  7   0-12  %


 


Eosinophils (%) (Auto)  4   0-10  %


 


Basophils (%) (Auto)  1   0-10  %


 


Neutrophils # (Auto)


 


 4.7 


 


 1.8-7.8


10^3/uL


 


Lymphocytes # (Auto)


 


 3.0 


 


 1.0-4.0


10^3/uL


 


Monocytes # (Auto)


 


 0.7 


 


 0.0-1.0


10^3/uL


 


Eosinophils # (Auto)


 


 0.4 H


 


 0.0-0.3


10^3/uL


 


Basophils # (Auto)


 


 0.1 


 


 0.0-0.1


10^3/uL


 


Immature Granulocyte # (Auto)


 


 0.0 


 


 0.0-0.1


10^3/uL


 


Prothrombin Time  14.4   12.2-14.7  SEC


 


INR Comment  1.1   0.8-1.4  


 


Activated Partial


Thromboplast Time 


 37 H


 


 24-35  SEC





 


Sodium Level  130 L  135-145  MMOL/L


 


Potassium Level  3.2 L  3.6-5.0  MMOL/L


 


Chloride Level  96 L    MMOL/L


 


Carbon Dioxide Level  17 L  21-32  MMOL/L


 


Anion Gap  17 H  5-14  MMOL/L


 


Blood Urea Nitrogen  5 L  7-18  MG/DL


 


Creatinine


 


 0.67 


 


 0.60-1.30


MG/DL


 


Estimat Glomerular Filtration


Rate 


 92 


 


  





 


BUN/Creatinine Ratio  7    


 


Glucose Level  102     MG/DL


 


Calcium Level  8.4 L  8.5-10.1  MG/DL


 


Corrected Calcium  8.4 L  8.5-10.1  MG/DL


 


Total Bilirubin  0.4   0.1-1.0  MG/DL


 


Aspartate Amino Transf


(AST/SGOT) 


 24 


 


 5-34  U/L





 


Alanine Aminotransferase


(ALT/SGPT) 


 16 


 


 0-55  U/L





 


Alkaline Phosphatase  100     U/L


 


Total Protein  7.2   6.4-8.2  GM/DL


 


Albumin  4.0   3.2-4.5  GM/DL


 


Serum Alcohol  292 H  <10  MG/DL


 


Influenza Type A (RT-PCR)   Not Detected  Not Detecte  


 


Influenza Type B (RT-PCR)   Not Detected  Not Detecte  


 


SARS-CoV-2 RNA (RT-PCR)   Not Detected  Not Detecte  








My Orders





Orders - MATTHEW DOMÍNGUEZ DO


Ed Iv/Invasive Line Start (6/3/22 21:45)


Monitor-Rhythm Ecg Trace Only (6/3/22 21:45)


Alcohol (6/3/22 21:45)


Cbc With Automated Diff (6/3/22 21:45)


Comprehensive Metabolic Panel (6/3/22 21:45)


Drug Screen Stat (Urine) (6/3/22 21:45)


Ua Culture If Indicated (6/3/22 21:45)


Ct Head/Cervical Spine Wo (6/3/22 21:45)


Ed Iv/Invasive Line Start (6/3/22 21:45)


Lactated Ringers (Lr 1000 Ml Iv Solution (6/3/22 21:45)


Protime With Inr (6/3/22 21:45)


Partial Thromboplastin Time (6/3/22 21:45)


Covid 19 Inhouse Test (6/3/22 21:45)


Influenza A And B By Pcr (6/3/22 21:45)


Isolation Central Supply Req (6/3/22 21:45)


Urine Culture (6/3/22 11:54)


Ed Iv/Invasive Line Start (6/3/22 22:53)


Lactated Ringers (Lr 1000 Ml Iv Solution (6/3/22 23:00)





Medications Given in ED





Current Medications








 Medications  Dose


 Ordered  Sig/Stanislav


 Route  Start Time


 Stop Time Status Last Admin


Dose Admin


 


 Lactated Ringer's  1,000 ml @ 


 0 mls/hr  Q0M ONCE


 IV  6/3/22 21:45


 6/3/22 21:49 DC 6/3/22 22:29


1,000 MLS/HR








Vital Signs/I&O











 6/3/22





 21:42


 


Temp 36.2


 


Pulse 79


 


Resp 18


 


B/P (MAP) 133/94 (107)











Progress


Progress Note :  


Progress Note


GIVEN IV FLUIDS





NO DETERIORATION IN PT'S CONDITION DURING ER STAY





Diagnostic Imaging





Comments


CT HEAD/CERVICAL SPINE--PER RADIOLOGIST REPORT AT 2214


CT HEAD: The ventricles are normal in size, shape and position.


There are no masses or hemorrhages. There are no extra-axial


fluid collections. There are no skull fractures seen.





IMPRESSION: Mild senescent changes. No acute abnormality is seen.





CT CERVICAL SPINE: Odontoid is intact. Atlantoaxial and


basicervical relationships appear normal. Vertebral body


alignment is normal. There are degenerative changes of the


atlantoaxial joint. There is degenerative disc change at C5-C6.


Posterior elements are unremarkable. There are no fractures.





IMPRESSION: Degenerative changes of the cervical spine. No acute


abnormality is seen.


   Reviewed:  Reviewed by Me





Departure


Impression





   Primary Impression:  


   Fall from standing


   Additional Impressions:  


   Closed head injury without loss of consciousness


   ANTICOAGULATION THERAPY


   Alcohol intoxication in active alcoholic


   UTI (urinary tract infection)


   Electrolyte imbalance


   Cervical myofascial strain


Disposition:  01 HOME, SELF-CARE


Condition:  Stable





Departure-Patient Inst.


Decision time for Depature:  23:52


Referrals:  


STEVE CRESPO MD (PCP/Family)


Primary Care Physician


Patient Instructions:  Alcohol Intoxication ED, Closed Head Injury (DC), 

Preventing Falls ED, Urinary Tract Infection, Adult ED, Cervical Muscle Strain 

(DC)





Add. Discharge Instructions:  


HOME, REST





NO ALCOHOL!





TYLENOL AS NEEDED FOR PAIN 





FOLLOW UP WITH DR. CRESPO IN 1-2 DAYS FOR FURTHER CARE, RETURN TO ER IF SYMPTOMS 

WORSEN





All discharge instructions reviewed with patient and/or family. Voiced 

understanding.











MATTHEW DOMÍNGUEZ DO                  Da 3, 2022 22:14

## 2022-06-04 VITALS — SYSTOLIC BLOOD PRESSURE: 114 MMHG | DIASTOLIC BLOOD PRESSURE: 79 MMHG
